# Patient Record
Sex: MALE | Race: WHITE | NOT HISPANIC OR LATINO | Employment: OTHER | URBAN - NONMETROPOLITAN AREA
[De-identification: names, ages, dates, MRNs, and addresses within clinical notes are randomized per-mention and may not be internally consistent; named-entity substitution may affect disease eponyms.]

---

## 2017-05-02 ENCOUNTER — AMBULATORY - GICH (OUTPATIENT)
Dept: PHYSICAL THERAPY | Facility: OTHER | Age: 77
End: 2017-05-02

## 2017-05-02 DIAGNOSIS — M54.50 LOW BACK PAIN: ICD-10-CM

## 2017-05-03 ENCOUNTER — HOSPITAL ENCOUNTER (OUTPATIENT)
Dept: PHYSICAL THERAPY | Facility: OTHER | Age: 77
Setting detail: THERAPIES SERIES
End: 2017-05-03

## 2017-05-03 DIAGNOSIS — M54.50 LOW BACK PAIN: ICD-10-CM

## 2017-05-09 ENCOUNTER — HOSPITAL ENCOUNTER (OUTPATIENT)
Dept: PHYSICAL THERAPY | Facility: OTHER | Age: 77
Setting detail: THERAPIES SERIES
End: 2017-05-09

## 2018-01-04 NOTE — INITIAL ASSESSMENTS
Patient Information     Patient Name MRN Sex Aden Chadwick [id number] Male 1940      Initial Assessments by Patience Stanley PT at 5/3/2017  9:01 AM     Author:  Patience Stanley PT Service:  (none) Author Type:  PT- Physical Therapist     Filed:  5/3/2017  1:50 PM Date of Service:  5/3/2017  9:01 AM Status:  Signed     :  Patience Stanley PT (PT- Physical Therapist)            [facility]    Outpatient PT - Initial Evaluation       Spine Evaluation      Date of Service: 5/3/2017   Visit# 1 (1 of 10)    Patient Name: Aden Rosa   YOB: 1940   Referring MD/Provider: Dr. Ramiro Olivares  Medical and Treatment Diagnosis: Low back Pain  PT Treatment Diagnosis: Lumbar stacked segmental dysfunction, backward sacral torsion, decrease left hip mobility, deconditioned  Start of Service: 17  Certification Dates: Start of Service: 17  Medicare/MA Re-Cert Due: 17     Living Situations:  Independent in Living Situation yes     Preadmission Functional Mobility: Independent  Yes  Cognition:  Oriented to Person, Place, and Time. yes  Precautions:  Multiple myeoloma    Were cultural / age or other special adaptations needed? No   Patient is a vulnerable adult: No   Patient is aware of diagnosis: Yes   Risks and benefits explained: Yes    Subjective      Patient is a 78 y/o male referred to PT due to left low back pain.  History: about 2 months ago he was brushing with the chain saw swinging back and forth. He also reports that he has cancer: multiple myeloma that is in the blood and is eating the bone. He reports a history of right low back pain. 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 He was supposed to have surgery,but, they feel the bone is too soft to do surgery.  Pt states that MD reviewed x-rays and blood work to rule out that his cancer could be the pain generator.   Patient s chief complaint: left low back pain    Current  Symptoms:    Pain Rating:  Current:   0 = no pain, comfortable, Worse  7 = Severe Pain, (Miserable, Gnawing, Fierce, Piercing)   Best  0 = no pain, comfortable        Symptoms are /intermittent.  Symptoms are described as: sharp,jabbing  Symptoms are unchanging:  Past 2 months  Pain Location - left low back and sacrum  Decreased Motion - no  Weakness - back  Swelling - no  Tingling/Numbness - no    Current functional difficulties include: ADL S:  Getting up down from chair, getting out of bed, bending to dress especially getting up                                                             Work: re-tired                                                             Recreational:  Working on cars; packing wheels and putting on shocks, shefali up car and getting up off floor                                                             Sleep: difficult finding a comfortable postion  Symptom onset:,Standing 5-10 min, Walking 10 min,  Dress/Groom , Lift/Bend/carry 10 lbs with back brace    Related Symptoms: headaches    Symptoms are decreased by: icing,stretches, heat    Prior Level:  No difficulty completing the above functional activities prior to onset.      Occupation:  [employer]/ /   Work Status: re-tired      Significant PMHX:  cancer       No past medical history on file.  No past surgical history on file.    Previous Treatment:    Pain Meds / Anti-inflammatory Medications: yes- OxyContin and oxycodone   Physical Therapy - no  Injections - no  Surgery - yes; lumbar; L4-5 disectomy    Diagnostics:  Reviewed (see chart)   Current Medications:  Reviewed (see chart)    Drug Allergies:  Reviewed (see chart)  ?   Latex Allergy:  See chart for allergies    Patient's Goals: decrease pain in order to go to car show in [place], [place]      Objective    Items left blank indicate that the test was inappropriate or not meaningful at the time of evaluation and therefore not performed.    Fall Risk Screening:   No risk factors identified    STANDING:           Posture: increase thoracic kyphosis, flattening of lumbar lordosis         Handedness: right         General listening: left posterior         Loading; decrease right pelvis         Active ROM: flexion 70, extension 10               Spinal Dysrhythmia: No         Stork Test: positive bilateral         Side bend from above: Left: and , Right: finger tips above knee joint line         Hip Drop Test: Left: decrease , Right: decrease pain on opposite side         Standing Pelvic Clocks: 3:00 = right side bending        SITTING:          KIMMY/Sacral Base Position: left posterior       Lumbar Findings: ERS-Right L5, FRS-Left L2 and FRS-Right L1       Thoracic Findings: decrease extension    SUPINE:        Hip: positive left ARACELI; tight bilateral piriformis      SLR: Right: 60 , Left 70 degrees      Palpation: tender bilateral iliopsoas, fascial tightness of liver and former gall bladder      PRONE:         Hip Extension: decreased bilaterally      KIMMY/Sacral Base Position: posterior left: left on right backward sacral torsion      Lumbar Findings: FRS-Right L3, FRS-Left L4, FRS-Right L5      Thoracic Findings: decrease extension      Palpation: no major QL tenderness        Today's Intervention:  Manual Therapy:  MET FRS-Right L3, FRS-Left L4, FRS-Right L5, and left on right backward sacral torsion correction; QL release. Therapeutic Exercises: see below        Response to Intervention:   Good tolerance to treatment     Home Exercise Program:  Instructed in beginning HEP to improve ROM, Flexibility, and strength   Prone press ups, supine hamstring/ dura stretch and piriformis stretch below and above 90 degrees      Assessment    Therapist Assessment / Clinical Impression:  Stacked extended lumbar dysfunctions, backward sacral torsion, decrease left hip mobility, deconditioned    Functional Impairment(s): See subjective on initial evaluation and Functional Assessment  listed below.      Patient Specific Functional and Pain Scales (PSFS):    Clinician Instructions: Complete after the history and before the exam.    Initial Assessment: We want to know what 3 activities in your life you are unable to perform, or are having the most difficulty performing, as a result of your chief problem. Please list and score at least 3 activities that you are unable to perform, or having the most difficulty performing, because of your chief problem.   Patient Specific Activity Scoring Scheme (score one number for each activity):   Activity Score (0-10)  0= Unable to perform activity  10= Able to perform activity at same level as before injury or problem   1. Standing 30 minutes 3/10   2. Walking 30 minutes 2/10   3. Working on cars for 60 minutes  4/10   4. Lifting/Bending/Carrying 25 lbs 4/10   5.  /10   Totals:  13/40 = 33 % ability which relates to 67% impairment    Patient verbally states that they understand that the information they have provided above is current and complete to the best of their knowledge.    Patient Specific Functional Scale Modifier Scale Conversion: (patient's modifier that correlates with pt's score on PSFS): 4-CL (60% Impaired).    G codes and Modifier taken from patient completing the PSFS:   Initial Primary G Code and Modifier:    Per the Patient's intake and/or assessment the Primary G Code is: Mobility .   The Patient's Impairment, Limitation or Restriction Modifier would be best described as: CL - 60% - 80% Impairment.   Goal Primary G Code and Modifier:    The Patient's G Code Goal would be: Mobility    The Patient's Impairment, Limitation or Restriction Modifier goal would be best described as: CJ - 20% - 40% Impairment.        Goals:  Patient goal:  See subjective      Short term goals: ( 4 weeks)    Pt will tolerate standing 30 minutes with moderate to minimal pain    Pt will tolerate walking 30 minutes with moderate to minimal pain    Pt will  tolerate working on cars for 60 minutes with moderate to minimal pain    Pt will tolerate lifting,bending,carrying 25 lbs with moderate to minimal pain        Long term goals: ( 8 weeks)    Patient will be independent in their Home Exercise Program      Pt will tolerate standing 30 minutes with minimal to no pain    Pt will tolerate walking 30 minutes with minimal to no pain    Pt will tolerate working on cars for 60 minutes with minimal to no pain    Pt will tolerate lifting,bending,carrying 25 lbs with minimal to no pain        Patient participated in goal selection and understand(s) the plan of care: Yes   Patient Potential for Achieving Desired Outcome: Good     Plan    Treatment Plan / Targeted Outcomes:      Frequency:   16 visits    Duration of Treatment: 8 weeks    Planned Interventions:    Home Exercise Program development  Therapeutic Exercise (ROM & Strengthening)  Neuromuscular Re-education  Manual Therapy      Thank you for your referral to [facility].  Please call with any questions, concerns or comments.  [phone]    HANNY Hernandez,PT

## 2018-01-04 NOTE — PROGRESS NOTES
Patient Information     Patient Name MRN Sex Aden Chadwick [id number] Male 1940      Progress Notes by Patience Stanley PT at 2017 10:19 AM     Author:  Patience Stanley PT Service:  (none) Author Type:  PT- Physical Therapist     Filed:  2017 12:23 PM Date of Service:  2017 10:19 AM Status:  Signed     :  Patience Stanley PT (PT- Physical Therapist)            [facility]    Outpatient PT - Daily Note      Date of Service: 2017   Visit# 2 (2 of 10)    Patient Name: Aden Rosa   YOB: 1940   Referring MD/Provider: Dr. Ramiro Olivares  Medical and Treatment Diagnosis: Low back Pain  PT Treatment Diagnosis: Lumbar stacked segmental dysfunction, backward sacral torsion, decrease left hip mobility, deconditioned  Start of Service: 17  Certification Dates: Start of Service: 17  Medicare/MA Re-Cert Due: 17     Living Situations:  Independent in Living Situation yes     Preadmission Functional Mobility: Independent  Yes  Cognition:  Oriented to Person, Place, and Time. yes  Precautions:  Multiple myeoloma    Were cultural / age or other special adaptations needed? No   Patient is a vulnerable adult: No   Patient is aware of diagnosis: Yes   Risks and benefits explained: Yes    Subjective      Patient returns to PT stating that he felt better after leaving PT last appointment. Later that day on Wednesday, went to neighbors and wired in switch on his dune buggy. He did hid exercises that night and felt stiff. Pt states that he feels a little tightness across low back and feels better than last week. Pt states that right total knee is tight.     Current Symptoms:    Pain Rating:  Current:   3 = Mild Pain, (Bothersome, Annoying, Irritating, Nagging), Worse  7 = Severe Pain, (Miserable, Gnawing, Fierce, Piercing)   Best  0 = no pain, comfortable        Symptoms are /intermittent.  Symptoms are described as: sharp,jabbing  Symptoms are  unchanging:  Past 2 months  Pain Location - left low back and sacrum  Decreased Motion - no  Weakness - back  Swelling - no  Tingling/Numbness - no    Current functional difficulties include: ADL S:  Getting up down from chair, getting out of bed, bending to dress especially getting up                                                             Work: re-tired                                                             Recreational:  Working on cars; packing wheels and putting on shocks, shefali up car and getting up off floor                                                             Sleep: difficult finding a comfortable postion  Symptom onset:,Standing 5-10 min, Walking 10 min,  Dress/Groom , Lift/Bend/carry 10 lbs with back brace    Related Symptoms: headaches    Symptoms are decreased by: icing,stretches, heat    Prior Level:  No difficulty completing the above functional activities prior to onset.      Occupation:  [employer]/ /   Work Status: re-tired      Significant PMHX:  cancer       No past medical history on file.  No past surgical history on file.    Previous Treatment:    Pain Meds / Anti-inflammatory Medications: yes- OxyContin and oxycodone   Physical Therapy - no  Injections - no  Surgery - yes; lumbar; L4-5 disectomy    Diagnostics:  Reviewed (see chart)   Current Medications:  Reviewed (see chart)    Drug Allergies:  Reviewed (see chart)  ?   Latex Allergy:  See chart for allergies    Patient's Goals: decrease pain in order to go to car show in [place], [place]      Objective    Items left blank indicate that the test was inappropriate or not meaningful at the time of evaluation and therefore not performed.    Fall Risk Screening:  No risk factors identified    STANDING:           Posture: increase thoracic kyphosis, flattening of lumbar lordosis         Handedness: right         General listening: left posterior         Loading; decrease right pelvis         Active ROM:  flexion 62, extension 18               Spinal Dysrhythmia: No         Stork Test: positive bilateral         Side bend from above: Left: and , Right: finger tips above knee joint line         Hip Drop Test: Left: decrease , Right: decrease pain on opposite side         Standing Pelvic Clocks: 3:00 = right side bending        SITTING:          KIMMY/Sacral Base Position: level       Lumbar Findings:  FRS-Right L1       Thoracic Findings: decrease extension    SUPINE:        Hip: positive left ARACELI; tight bilateral piriformis      SLR: Right: 60 , Left 70 degrees      Palpation: tender bilateral iliopsoas, fascial tightness of liver and former gall bladder      PRONE:         Hip Extension: decreased bilaterally      KIMMY/Sacral Base Position: posterior left: left on right backward sacral torsion      Lumbar Findings:: FRS-Right L1, FRS-Right L5      Thoracic Findings: decrease extension      Palpation: no major QL tenderness        Today's Intervention:  Manual Therapy:  MET: FRS-Right L1, FRS-Right L5, and left on right backward sacral torsion correction; QL release. Manual stretch hamstring/dura, piriformis, quadriceps, and iliopsoas. MFR: fascia over liver and scar tissue of gall bladder Therapeutic Exercises: see below        Response to Intervention:   Good tolerance to treatment     Home Exercise Program:  Instructed in beginning HEP to improve ROM, Flexibility, and strength   Prone press ups, supine hamstring/ dura stretch and piriformis stretch below and above 90 degrees.  Added seated Jose L chair, rotation, and side bending stretch, and standing hip flexor stretch.      Assessment    Therapist Assessment / Clinical Impression:  Stacked extended lumbar dysfunctions, backward sacral torsion, decrease left hip mobility, deconditioned    Functional Impairment(s): See subjective on initial evaluation and Functional Assessment listed below.      Patient Specific Functional and Pain Scales (PSFS):    Clinician  Instructions: Complete after the history and before the exam.    Initial Assessment: We want to know what 3 activities in your life you are unable to perform, or are having the most difficulty performing, as a result of your chief problem. Please list and score at least 3 activities that you are unable to perform, or having the most difficulty performing, because of your chief problem.   Patient Specific Activity Scoring Scheme (score one number for each activity):   Activity Score (0-10)  0= Unable to perform activity  10= Able to perform activity at same level as before injury or problem   1. Standing 30 minutes 3/10   2. Walking 30 minutes 2/10   3. Working on cars for 60 minutes  4/10   4. Lifting/Bending/Carrying 25 lbs 4/10   5.  /10   Totals:  13/40 = 33 % ability which relates to 67% impairment    Patient verbally states that they understand that the information they have provided above is current and complete to the best of their knowledge.    Patient Specific Functional Scale Modifier Scale Conversion: (patient's modifier that correlates with pt's score on PSFS): 4-CL (60% Impaired).    G codes and Modifier taken from patient completing the PSFS:   Initial Primary G Code and Modifier:    Per the Patient's intake and/or assessment the Primary G Code is: Mobility .   The Patient's Impairment, Limitation or Restriction Modifier would be best described as: CL - 60% - 80% Impairment.   Goal Primary G Code and Modifier:    The Patient's G Code Goal would be: Mobility    The Patient's Impairment, Limitation or Restriction Modifier goal would be best described as: CJ - 20% - 40% Impairment.        Goals:  Patient goal:  See subjective      Short term goals: ( 4 weeks)    Pt will tolerate standing 30 minutes with moderate to minimal pain    Pt will tolerate walking 30 minutes with moderate to minimal pain    Pt will tolerate working on cars for 60 minutes with moderate to minimal pain    Pt will tolerate  lifting,bending,carrying 25 lbs with moderate to minimal pain        Long term goals: ( 8 weeks)    Patient will be independent in their Home Exercise Program      Pt will tolerate standing 30 minutes with minimal to no pain    Pt will tolerate walking 30 minutes with minimal to no pain    Pt will tolerate working on cars for 60 minutes with minimal to no pain    Pt will tolerate lifting,bending,carrying 25 lbs with minimal to no pain        Patient participated in goal selection and understand(s) the plan of care: Yes   Patient Potential for Achieving Desired Outcome: Good     Plan    Treatment Plan / Targeted Outcomes:      Frequency:   16 visits    Duration of Treatment: 8 weeks    Planned Interventions:    Home Exercise Program development  Therapeutic Exercise (ROM & Strengthening)  Neuromuscular Re-education  Manual Therapy      Thank you for your referral to [facility].  Please call with any questions, concerns or comments.  [phone]    HANNY Hernandez,PT

## 2018-01-05 NOTE — DISCHARGE SUMMARY
Patient Information     Patient Name MRN Sex Aden Chadwick [id number] Male 1940      Discharge Summaries by Patience Stanley PT at 2017  8:42 AM     Author:  Patience Stanley PT Service:  (none) Author Type:  PT- Physical Therapist     Filed:  2017  8:44 AM Date of Service:  2017  8:42 AM Status:  Signed     :  Patience Stanley PT (PT- Physical Therapist)            [facility]    Outpatient PT - Daily Note      Addendum:  2017  Last date of Service: 17  Discharge date: 17  Discharge Summary:    Unplanned discharge   Patient seen for 2 visits total and did not return for additional PT visits.      Reason for discontinuation: patient called and cancelled remaining appointments. He states that exercises are too much for him at this time.   Goals were not met or only partially met.   Patient is discharged from PT.     Patience Stanley MOMT PT    Information below is from the last treatment date.    Date of Service: 2017   Visit# 2 (2 of 10)    Patient Name: Aden Rosa   YOB: 1940   Referring MD/Provider: Dr. Ramiro Olivares  Medical and Treatment Diagnosis: Low back Pain  PT Treatment Diagnosis: Lumbar stacked segmental dysfunction, backward sacral torsion, decrease left hip mobility, deconditioned  Start of Service: 17  Certification Dates: Start of Service: 17  Medicare/MA Re-Cert Due: 17     Living Situations:  Independent in Living Situation yes     Preadmission Functional Mobility: Independent  Yes  Cognition:  Oriented to Person, Place, and Time. yes  Precautions:  Multiple myeoloma    Were cultural / age or other special adaptations needed? No   Patient is a vulnerable adult: No   Patient is aware of diagnosis: Yes   Risks and benefits explained: Yes    Subjective      Patient returns to PT stating that he felt better after leaving PT last appointment. Later that day on Wednesday, went to neighbors and wired  in switch on his dune buggy. He did hid exercises that night and felt stiff. Pt states that he feels a little tightness across low back and feels better than last week. Pt states that right total knee is tight.     Current Symptoms:    Pain Rating:  Current:   3 = Mild Pain, (Bothersome, Annoying, Irritating, Nagging), Worse  7 = Severe Pain, (Miserable, Gnawing, Fierce, Piercing)   Best  0 = no pain, comfortable        Symptoms are /intermittent.  Symptoms are described as: sharp,jabbing  Symptoms are unchanging:  Past 2 months  Pain Location - left low back and sacrum  Decreased Motion - no  Weakness - back  Swelling - no  Tingling/Numbness - no    Current functional difficulties include: ADL S:  Getting up down from chair, getting out of bed, bending to dress especially getting up                                                             Work: re-tired                                                             Recreational:  Working on cars; packing wheels and putting on shocks, shefali up car and getting up off floor                                                             Sleep: difficult finding a comfortable postion  Symptom onset:,Standing 5-10 min, Walking 10 min,  Dress/Groom , Lift/Bend/carry 10 lbs with back brace    Related Symptoms: headaches    Symptoms are decreased by: icing,stretches, heat    Prior Level:  No difficulty completing the above functional activities prior to onset.      Occupation:  [employer]   Work Status: re-tired      Significant PMHX:  cancer       No past medical history on file.  No past surgical history on file.    Previous Treatment:    Pain Meds / Anti-inflammatory Medications: yes- OxyContin and oxycodone   Physical Therapy - no  Injections - no  Surgery - yes; lumbar; L4-5 disectomy    Diagnostics:  Reviewed (see chart)   Current Medications:  Reviewed (see chart)    Drug Allergies:  Reviewed (see chart)  ?   Latex Allergy:  See chart for  allergies    Patient's Goals: decrease pain in order to go to car show in [place], [place]      Objective    Items left blank indicate that the test was inappropriate or not meaningful at the time of evaluation and therefore not performed.    Fall Risk Screening:  No risk factors identified    STANDING:           Posture: increase thoracic kyphosis, flattening of lumbar lordosis         Handedness: right         General listening: left posterior         Loading; decrease right pelvis         Active ROM: flexion 62, extension 18               Spinal Dysrhythmia: No         Stork Test: positive bilateral         Side bend from above: Left: and , Right: finger tips above knee joint line         Hip Drop Test: Left: decrease , Right: decrease pain on opposite side         Standing Pelvic Clocks: 3:00 = right side bending        SITTING:          KIMMY/Sacral Base Position: level       Lumbar Findings:  FRS-Right L1       Thoracic Findings: decrease extension    SUPINE:        Hip: positive left ARACELI; tight bilateral piriformis      SLR: Right: 60 , Left 70 degrees      Palpation: tender bilateral iliopsoas, fascial tightness of liver and former gall bladder      PRONE:         Hip Extension: decreased bilaterally      KIMMY/Sacral Base Position: posterior left: left on right backward sacral torsion      Lumbar Findings:: FRS-Right L1, FRS-Right L5      Thoracic Findings: decrease extension      Palpation: no major QL tenderness        Today's Intervention:  Manual Therapy:  MET: FRS-Right L1, FRS-Right L5, and left on right backward sacral torsion correction; QL release. Manual stretch hamstring/dura, piriformis, quadriceps, and iliopsoas. MFR: fascia over liver and scar tissue of gall bladder Therapeutic Exercises: see below        Response to Intervention:   Good tolerance to treatment     Home Exercise Program:  Instructed in beginning HEP to improve ROM, Flexibility, and strength   Prone press ups, supine  hamstring/ dura stretch and piriformis stretch below and above 90 degrees.  Added seated Jose L chair, rotation, and side bending stretch, and standing hip flexor stretch.      Assessment    Therapist Assessment / Clinical Impression:  Stacked extended lumbar dysfunctions, backward sacral torsion, decrease left hip mobility, deconditioned    Functional Impairment(s): See subjective on initial evaluation and Functional Assessment listed below.      Patient Specific Functional and Pain Scales (PSFS):    Clinician Instructions: Complete after the history and before the exam.    Initial Assessment: We want to know what 3 activities in your life you are unable to perform, or are having the most difficulty performing, as a result of your chief problem. Please list and score at least 3 activities that you are unable to perform, or having the most difficulty performing, because of your chief problem.   Patient Specific Activity Scoring Scheme (score one number for each activity):   Activity Score (0-10)  0= Unable to perform activity  10= Able to perform activity at same level as before injury or problem   1. Standing 30 minutes 3/10   2. Walking 30 minutes 2/10   3. Working on cars for 60 minutes  4/10   4. Lifting/Bending/Carrying 25 lbs 4/10   5.  /10   Totals:  13/40 = 33 % ability which relates to 67% impairment    Patient verbally states that they understand that the information they have provided above is current and complete to the best of their knowledge.    Patient Specific Functional Scale Modifier Scale Conversion: (patient's modifier that correlates with pt's score on PSFS): 4-CL (60% Impaired).    G codes and Modifier taken from patient completing the PSFS:   Initial Primary G Code and Modifier:    Per the Patient's intake and/or assessment the Primary G Code is: Mobility .   The Patient's Impairment, Limitation or Restriction Modifier would be best described as: CL - 60% - 80% Impairment.   Goal Primary G  Code and Modifier:    The Patient's G Code Goal would be: Mobility    The Patient's Impairment, Limitation or Restriction Modifier goal would be best described as: CJ - 20% - 40% Impairment.        Goals:  Patient goal:  See subjective      Short term goals: ( 4 weeks)    Pt will tolerate standing 30 minutes with moderate to minimal pain    Pt will tolerate walking 30 minutes with moderate to minimal pain    Pt will tolerate working on cars for 60 minutes with moderate to minimal pain    Pt will tolerate lifting,bending,carrying 25 lbs with moderate to minimal pain        Long term goals: ( 8 weeks)    Patient will be independent in their Home Exercise Program      Pt will tolerate standing 30 minutes with minimal to no pain    Pt will tolerate walking 30 minutes with minimal to no pain    Pt will tolerate working on cars for 60 minutes with minimal to no pain    Pt will tolerate lifting,bending,carrying 25 lbs with minimal to no pain        Patient participated in goal selection and understand(s) the plan of care: Yes   Patient Potential for Achieving Desired Outcome: Good     Plan    Treatment Plan / Targeted Outcomes:      Frequency:   16 visits    Duration of Treatment: 8 weeks    Planned Interventions:    Home Exercise Program development  Therapeutic Exercise (ROM & Strengthening)  Neuromuscular Re-education  Manual Therapy      Thank you for your referral to [facility].  Please call with any questions, concerns or comments.  [phone]    HANNY Hernandez,PT

## 2018-03-06 ENCOUNTER — DOCUMENTATION ONLY (OUTPATIENT)
Dept: FAMILY MEDICINE | Facility: OTHER | Age: 78
End: 2018-03-06

## 2018-03-27 ENCOUNTER — TRANSFERRED RECORDS (OUTPATIENT)
Dept: HEALTH INFORMATION MANAGEMENT | Facility: CLINIC | Age: 78
End: 2018-03-27

## 2018-03-27 LAB
ALT SERPL-CCNC: 22 U/L (ref 18–65)
AST SERPL-CCNC: 22 U/L (ref 10–40)
CREAT SERPL-MCNC: 1.46 MG/DL (ref 0.8–1.5)
GFR SERPL CREATININE-BSD FRML MDRD: 47 ML/MIN/1.73M2
GLUCOSE SERPL-MCNC: 124 MG/DL (ref 60–99)
POTASSIUM SERPL-SCNC: 4 MEQ/L (ref 3.5–5.1)

## 2018-03-29 ENCOUNTER — HOSPITAL ENCOUNTER (OUTPATIENT)
Dept: CT IMAGING | Facility: HOSPITAL | Age: 78
Discharge: HOME | End: 2018-03-29
Attending: FAMILY MEDICINE | Admitting: FAMILY MEDICINE
Payer: MEDICARE

## 2018-03-29 DIAGNOSIS — R07.9 CHEST PAIN, UNSPECIFIED TYPE: ICD-10-CM

## 2018-03-29 DIAGNOSIS — Z85.830 HISTORY OF BONE CANCER: ICD-10-CM

## 2018-03-29 PROCEDURE — 71260 CT THORAX DX C+: CPT | Mod: TC

## 2018-03-29 PROCEDURE — 25000128 H RX IP 250 OP 636: Performed by: RADIOLOGY

## 2018-03-29 RX ORDER — IOPAMIDOL 612 MG/ML
100 INJECTION, SOLUTION INTRAVASCULAR ONCE
Status: COMPLETED | OUTPATIENT
Start: 2018-03-29 | End: 2018-03-29

## 2018-03-29 RX ADMIN — IOPAMIDOL 100 ML: 612 INJECTION, SOLUTION INTRAVENOUS at 10:59

## 2019-03-31 ENCOUNTER — HOSPITAL ENCOUNTER (INPATIENT)
Facility: OTHER | Age: 79
LOS: 7 days | Discharge: HOME | DRG: 871 | End: 2019-04-07
Attending: INTERNAL MEDICINE | Admitting: FAMILY MEDICINE
Payer: MEDICARE

## 2019-03-31 ENCOUNTER — APPOINTMENT (OUTPATIENT)
Dept: GENERAL RADIOLOGY | Facility: OTHER | Age: 79
DRG: 871 | End: 2019-03-31
Attending: INTERNAL MEDICINE
Payer: MEDICARE

## 2019-03-31 DIAGNOSIS — R09.02 HYPOXIA: ICD-10-CM

## 2019-03-31 DIAGNOSIS — J18.9 COMMUNITY ACQUIRED PNEUMONIA, UNSPECIFIED LATERALITY: Primary | ICD-10-CM

## 2019-03-31 DIAGNOSIS — C90.00 MULTIPLE MYELOMA, REMISSION STATUS UNSPECIFIED (H): ICD-10-CM

## 2019-03-31 DIAGNOSIS — R09.02 HYPOXEMIA: ICD-10-CM

## 2019-03-31 DIAGNOSIS — I10 ESSENTIAL HYPERTENSION: ICD-10-CM

## 2019-03-31 DIAGNOSIS — Z94.84 STEM CELLS TRANSPLANT STATUS (H): ICD-10-CM

## 2019-03-31 DIAGNOSIS — J84.10 PULMONARY FIBROSIS, UNSPECIFIED (H): ICD-10-CM

## 2019-03-31 DIAGNOSIS — D84.9 IMMUNODEFICIENCY (H): ICD-10-CM

## 2019-03-31 DIAGNOSIS — N20.0 KIDNEY STONES: ICD-10-CM

## 2019-03-31 DIAGNOSIS — Z92.21 PERSONAL HISTORY OF CHEMOTHERAPY: ICD-10-CM

## 2019-03-31 DIAGNOSIS — D84.9 IMMUNOCOMPROMISED PATIENT (H): ICD-10-CM

## 2019-03-31 DIAGNOSIS — C90.00 MULTIPLE MYELOMA, FAILED REMISSION (H): ICD-10-CM

## 2019-03-31 PROBLEM — A41.9 SEPSIS DUE TO PNEUMONIA (H): Status: ACTIVE | Noted: 2019-03-31

## 2019-03-31 PROBLEM — R65.10 SIRS (SYSTEMIC INFLAMMATORY RESPONSE SYNDROME) (H): Status: ACTIVE | Noted: 2019-03-31

## 2019-03-31 LAB
ALBUMIN SERPL-MCNC: 3.4 G/DL (ref 3.5–5.7)
ALP SERPL-CCNC: 44 U/L (ref 34–104)
ALT SERPL W P-5'-P-CCNC: 50 U/L (ref 7–52)
ANION GAP SERPL CALCULATED.3IONS-SCNC: 10 MMOL/L (ref 3–14)
AST SERPL W P-5'-P-CCNC: 151 U/L (ref 13–39)
BASOPHILS # BLD AUTO: 0 10E9/L (ref 0–0.2)
BASOPHILS NFR BLD AUTO: 0 %
BILIRUB SERPL-MCNC: 0.8 MG/DL (ref 0.3–1)
BUN SERPL-MCNC: 31 MG/DL (ref 7–25)
CALCIUM SERPL-MCNC: 8.5 MG/DL (ref 8.6–10.3)
CHLORIDE SERPL-SCNC: 102 MMOL/L (ref 98–107)
CO2 SERPL-SCNC: 22 MMOL/L (ref 21–31)
CREAT SERPL-MCNC: 2.04 MG/DL (ref 0.7–1.3)
DIFFERENTIAL METHOD BLD: ABNORMAL
EOSINOPHIL # BLD AUTO: 0 10E9/L (ref 0–0.7)
EOSINOPHIL NFR BLD AUTO: 0 %
ERYTHROCYTE [DISTWIDTH] IN BLOOD BY AUTOMATED COUNT: 15.8 % (ref 10–15)
FLUAV+FLUBV RNA SPEC QL NAA+PROBE: NEGATIVE
FLUAV+FLUBV RNA SPEC QL NAA+PROBE: NEGATIVE
GFR SERPL CREATININE-BSD FRML MDRD: 32 ML/MIN/{1.73_M2}
GLUCOSE SERPL-MCNC: 113 MG/DL (ref 70–105)
HCO3 BLD-SCNC: 21 MMOL/L (ref 22–28)
HCT VFR BLD AUTO: 33.4 % (ref 40–53)
HGB BLD-MCNC: 11.3 G/DL (ref 13.3–17.7)
IMM GRANULOCYTES # BLD: 0 10E9/L (ref 0–0.4)
IMM GRANULOCYTES NFR BLD: 1 %
LACTATE SERPL-SCNC: 1.6 MMOL/L (ref 0.5–2.2)
LYMPHOCYTES # BLD AUTO: 0.3 10E9/L (ref 0.8–5.3)
LYMPHOCYTES NFR BLD AUTO: 16.9 %
MCH RBC QN AUTO: 35.3 PG (ref 26.5–33)
MCHC RBC AUTO-ENTMCNC: 33.8 G/DL (ref 31.5–36.5)
MCV RBC AUTO: 104 FL (ref 78–100)
MONOCYTES # BLD AUTO: 0.2 10E9/L (ref 0–1.3)
MONOCYTES NFR BLD AUTO: 7.7 %
NEUTROPHILS # BLD AUTO: 1.5 10E9/L (ref 1.6–8.3)
NEUTROPHILS NFR BLD AUTO: 74.4 %
O2/TOTAL GAS SETTING VFR VENT: 28 %
OXYHGB MFR BLD: 88 %
PCO2 BLD: 35 MM HG (ref 35–45)
PH BLD: 7.4 PH (ref 7.35–7.45)
PLATELET # BLD AUTO: 83 10E9/L (ref 150–450)
PO2 BLD: 58 MM HG (ref 83–108)
POTASSIUM SERPL-SCNC: 3.8 MMOL/L (ref 3.5–5.1)
PROCALCITONIN SERPL-MCNC: 0.9 NG/ML
PROT SERPL-MCNC: 6.9 G/DL (ref 6.4–8.9)
RBC # BLD AUTO: 3.2 10E12/L (ref 4.4–5.9)
RSV RNA SPEC NAA+PROBE: NEGATIVE
SODIUM SERPL-SCNC: 134 MMOL/L (ref 134–144)
SPECIMEN SOURCE: NORMAL
WBC # BLD AUTO: 2 10E9/L (ref 4–11)

## 2019-03-31 PROCEDURE — 99285 EMERGENCY DEPT VISIT HI MDM: CPT | Mod: Z6 | Performed by: INTERNAL MEDICINE

## 2019-03-31 PROCEDURE — 25000125 ZZHC RX 250: Performed by: INTERNAL MEDICINE

## 2019-03-31 PROCEDURE — 85025 COMPLETE CBC W/AUTO DIFF WBC: CPT | Performed by: INTERNAL MEDICINE

## 2019-03-31 PROCEDURE — 93005 ELECTROCARDIOGRAM TRACING: CPT | Performed by: INTERNAL MEDICINE

## 2019-03-31 PROCEDURE — 71045 X-RAY EXAM CHEST 1 VIEW: CPT

## 2019-03-31 PROCEDURE — 80053 COMPREHEN METABOLIC PANEL: CPT | Performed by: INTERNAL MEDICINE

## 2019-03-31 PROCEDURE — 25000128 H RX IP 250 OP 636: Performed by: FAMILY MEDICINE

## 2019-03-31 PROCEDURE — 87040 BLOOD CULTURE FOR BACTERIA: CPT | Performed by: INTERNAL MEDICINE

## 2019-03-31 PROCEDURE — 96365 THER/PROPH/DIAG IV INF INIT: CPT | Performed by: INTERNAL MEDICINE

## 2019-03-31 PROCEDURE — 25800030 ZZH RX IP 258 OP 636: Performed by: FAMILY MEDICINE

## 2019-03-31 PROCEDURE — 99285 EMERGENCY DEPT VISIT HI MDM: CPT | Mod: 25 | Performed by: INTERNAL MEDICINE

## 2019-03-31 PROCEDURE — 96375 TX/PRO/DX INJ NEW DRUG ADDON: CPT | Performed by: INTERNAL MEDICINE

## 2019-03-31 PROCEDURE — 83605 ASSAY OF LACTIC ACID: CPT | Performed by: INTERNAL MEDICINE

## 2019-03-31 PROCEDURE — 94640 AIRWAY INHALATION TREATMENT: CPT

## 2019-03-31 PROCEDURE — A9270 NON-COVERED ITEM OR SERVICE: HCPCS | Mod: GY | Performed by: INTERNAL MEDICINE

## 2019-03-31 PROCEDURE — A9270 NON-COVERED ITEM OR SERVICE: HCPCS | Mod: GY | Performed by: FAMILY MEDICINE

## 2019-03-31 PROCEDURE — 87631 RESP VIRUS 3-5 TARGETS: CPT | Performed by: INTERNAL MEDICINE

## 2019-03-31 PROCEDURE — 82805 BLOOD GASES W/O2 SATURATION: CPT | Performed by: INTERNAL MEDICINE

## 2019-03-31 PROCEDURE — 36415 COLL VENOUS BLD VENIPUNCTURE: CPT | Performed by: INTERNAL MEDICINE

## 2019-03-31 PROCEDURE — 12000000 ZZH R&B MED SURG/OB

## 2019-03-31 PROCEDURE — 99222 1ST HOSP IP/OBS MODERATE 55: CPT | Mod: AI | Performed by: FAMILY MEDICINE

## 2019-03-31 PROCEDURE — 25000128 H RX IP 250 OP 636: Performed by: INTERNAL MEDICINE

## 2019-03-31 PROCEDURE — 96366 THER/PROPH/DIAG IV INF ADDON: CPT | Performed by: INTERNAL MEDICINE

## 2019-03-31 PROCEDURE — 40000275 ZZH STATISTIC RCP TIME EA 10 MIN

## 2019-03-31 PROCEDURE — 25800030 ZZH RX IP 258 OP 636: Performed by: INTERNAL MEDICINE

## 2019-03-31 PROCEDURE — 84145 PROCALCITONIN (PCT): CPT | Performed by: INTERNAL MEDICINE

## 2019-03-31 PROCEDURE — 93010 ELECTROCARDIOGRAM REPORT: CPT | Performed by: INTERNAL MEDICINE

## 2019-03-31 PROCEDURE — 87040 BLOOD CULTURE FOR BACTERIA: CPT | Mod: 91 | Performed by: INTERNAL MEDICINE

## 2019-03-31 PROCEDURE — 25000132 ZZH RX MED GY IP 250 OP 250 PS 637: Mod: GY | Performed by: INTERNAL MEDICINE

## 2019-03-31 PROCEDURE — 25000132 ZZH RX MED GY IP 250 OP 250 PS 637: Mod: GY | Performed by: FAMILY MEDICINE

## 2019-03-31 RX ORDER — OXYCODONE HCL 10 MG/1
10 TABLET, FILM COATED, EXTENDED RELEASE ORAL ONCE
Status: COMPLETED | OUTPATIENT
Start: 2019-03-31 | End: 2019-03-31

## 2019-03-31 RX ORDER — IPRATROPIUM BROMIDE AND ALBUTEROL SULFATE 2.5; .5 MG/3ML; MG/3ML
3 SOLUTION RESPIRATORY (INHALATION) ONCE
Status: COMPLETED | OUTPATIENT
Start: 2019-03-31 | End: 2019-03-31

## 2019-03-31 RX ORDER — ACETAMINOPHEN 325 MG/1
650 TABLET ORAL ONCE
Status: COMPLETED | OUTPATIENT
Start: 2019-03-31 | End: 2019-03-31

## 2019-03-31 RX ORDER — ONDANSETRON 2 MG/ML
4 INJECTION INTRAMUSCULAR; INTRAVENOUS EVERY 6 HOURS PRN
Status: DISCONTINUED | OUTPATIENT
Start: 2019-03-31 | End: 2019-04-07 | Stop reason: HOSPADM

## 2019-03-31 RX ORDER — ACETAMINOPHEN 325 MG/1
325-650 TABLET ORAL EVERY 6 HOURS PRN
Status: ON HOLD | COMMUNITY
End: 2019-04-01

## 2019-03-31 RX ORDER — SODIUM CHLORIDE, SODIUM LACTATE, POTASSIUM CHLORIDE, CALCIUM CHLORIDE 600; 310; 30; 20 MG/100ML; MG/100ML; MG/100ML; MG/100ML
INJECTION, SOLUTION INTRAVENOUS CONTINUOUS
Status: DISCONTINUED | OUTPATIENT
Start: 2019-03-31 | End: 2019-04-02

## 2019-03-31 RX ORDER — GABAPENTIN 600 MG/1
600 TABLET ORAL 3 TIMES DAILY
Status: DISCONTINUED | OUTPATIENT
Start: 2019-04-01 | End: 2019-04-01 | Stop reason: DRUGHIGH

## 2019-03-31 RX ORDER — LOPERAMIDE HCL 2 MG
2 CAPSULE ORAL 4 TIMES DAILY PRN
Status: DISCONTINUED | OUTPATIENT
Start: 2019-03-31 | End: 2019-04-01 | Stop reason: DRUGHIGH

## 2019-03-31 RX ORDER — ONDANSETRON 4 MG/1
4 TABLET, ORALLY DISINTEGRATING ORAL EVERY 6 HOURS PRN
Status: DISCONTINUED | OUTPATIENT
Start: 2019-03-31 | End: 2019-04-07 | Stop reason: HOSPADM

## 2019-03-31 RX ORDER — ASPIRIN 81 MG/1
81 TABLET ORAL DAILY
Status: DISCONTINUED | OUTPATIENT
Start: 2019-04-01 | End: 2019-04-07 | Stop reason: HOSPADM

## 2019-03-31 RX ORDER — GABAPENTIN 300 MG/1
600 CAPSULE ORAL ONCE
Status: COMPLETED | OUTPATIENT
Start: 2019-03-31 | End: 2019-03-31

## 2019-03-31 RX ORDER — LEVOFLOXACIN 750 MG/1
750 TABLET, FILM COATED ORAL DAILY
Status: ON HOLD | COMMUNITY
End: 2019-04-07

## 2019-03-31 RX ORDER — OXYCODONE HYDROCHLORIDE 5 MG/1
5 TABLET ORAL EVERY 4 HOURS PRN
Status: DISCONTINUED | OUTPATIENT
Start: 2019-03-31 | End: 2019-04-01

## 2019-03-31 RX ORDER — NALOXONE HYDROCHLORIDE 0.4 MG/ML
.1-.4 INJECTION, SOLUTION INTRAMUSCULAR; INTRAVENOUS; SUBCUTANEOUS
Status: DISCONTINUED | OUTPATIENT
Start: 2019-03-31 | End: 2019-04-07 | Stop reason: HOSPADM

## 2019-03-31 RX ORDER — OXYCODONE HYDROCHLORIDE 5 MG/1
5 TABLET ORAL ONCE
Status: COMPLETED | OUTPATIENT
Start: 2019-03-31 | End: 2019-03-31

## 2019-03-31 RX ORDER — TAMSULOSIN HYDROCHLORIDE 0.4 MG/1
0.4 CAPSULE ORAL DAILY
Status: DISCONTINUED | OUTPATIENT
Start: 2019-04-01 | End: 2019-04-01

## 2019-03-31 RX ORDER — PANTOPRAZOLE SODIUM 40 MG/1
40 TABLET, DELAYED RELEASE ORAL EVERY MORNING
Status: DISCONTINUED | OUTPATIENT
Start: 2019-04-01 | End: 2019-04-07 | Stop reason: HOSPADM

## 2019-03-31 RX ADMIN — SODIUM CHLORIDE, POTASSIUM CHLORIDE, SODIUM LACTATE AND CALCIUM CHLORIDE 1914 ML: 600; 310; 30; 20 INJECTION, SOLUTION INTRAVENOUS at 19:25

## 2019-03-31 RX ADMIN — SODIUM CHLORIDE 2000 MG: 900 INJECTION, SOLUTION INTRAVENOUS at 19:58

## 2019-03-31 RX ADMIN — IPRATROPIUM BROMIDE AND ALBUTEROL SULFATE 3 ML: .5; 3 SOLUTION RESPIRATORY (INHALATION) at 19:08

## 2019-03-31 RX ADMIN — OXYCODONE HYDROCHLORIDE 10 MG: 10 TABLET, FILM COATED, EXTENDED RELEASE ORAL at 20:12

## 2019-03-31 RX ADMIN — OXYCODONE HYDROCHLORIDE 5 MG: 5 TABLET ORAL at 20:04

## 2019-03-31 RX ADMIN — TAZOBACTAM SODIUM AND PIPERACILLIN SODIUM 4.5 G: 500; 4 INJECTION, SOLUTION INTRAVENOUS at 19:26

## 2019-03-31 RX ADMIN — LOPERAMIDE HYDROCHLORIDE 2 MG: 2 CAPSULE ORAL at 22:15

## 2019-03-31 RX ADMIN — AZITHROMYCIN MONOHYDRATE 500 MG: 500 INJECTION, POWDER, LYOPHILIZED, FOR SOLUTION INTRAVENOUS at 23:26

## 2019-03-31 RX ADMIN — GABAPENTIN 600 MG: 300 CAPSULE ORAL at 20:13

## 2019-03-31 RX ADMIN — ACETAMINOPHEN 650 MG: 325 TABLET, FILM COATED ORAL at 20:13

## 2019-03-31 RX ADMIN — SODIUM CHLORIDE, SODIUM LACTATE, POTASSIUM CHLORIDE, AND CALCIUM CHLORIDE 150 ML/HR: 600; 310; 30; 20 INJECTION, SOLUTION INTRAVENOUS at 21:10

## 2019-03-31 ASSESSMENT — ACTIVITIES OF DAILY LIVING (ADL)
RETIRED_COMMUNICATION: 0-->UNDERSTANDS/COMMUNICATES WITHOUT DIFFICULTY
RETIRED_EATING: 0-->INDEPENDENT
FALL_HISTORY_WITHIN_LAST_SIX_MONTHS: NO
TOILETING: 0-->INDEPENDENT
COGNITION: 0 - NO COGNITION ISSUES REPORTED
SWALLOWING: 0-->SWALLOWS FOODS/LIQUIDS WITHOUT DIFFICULTY
DRESS: 0-->INDEPENDENT
BATHING: 0-->INDEPENDENT
AMBULATION: 0-->INDEPENDENT
TRANSFERRING: 0-->INDEPENDENT

## 2019-03-31 ASSESSMENT — ENCOUNTER SYMPTOMS
FATIGUE: 1
FEVER: 1
WOUND: 0
CONFUSION: 0
APPETITE CHANGE: 1
BACK PAIN: 0
SHORTNESS OF BREATH: 1
WHEEZING: 1
BRUISES/BLEEDS EASILY: 1
ACTIVITY CHANGE: 1
CHEST TIGHTNESS: 0
COUGH: 1
HEMATURIA: 0
WEAKNESS: 1
ADENOPATHY: 0
CHILLS: 1
ABDOMINAL PAIN: 0

## 2019-03-31 ASSESSMENT — MIFFLIN-ST. JEOR: SCORE: 1592.62

## 2019-03-31 NOTE — ED TRIAGE NOTES
Pt to ER from home via EMS, was recently dx with pneumonia, placed on antibiotics. Pt had increased SOB and extreme weakness today, pt wife was going to bring pt in, but pt unable to walk or get in car. EMS reports 88% RA, placed on DuoNeb and O2.  Pt pale and lethargic on arrival.

## 2019-03-31 NOTE — PHARMACY-VANCOMYCIN DOSING SERVICE
"Pharmacy Consult- Vancomycin Assessment    Aden Rosa is a 79 year old male admitted on 3/31/2019.    Vancomycin has been ordered per MD, for the indication of: sepsis in ED    Current Antibiotic Regimen Includes: Zosyn    Patient Active Problem List   Diagnosis     Multiple myeloma (H)     Encounter for long-term current use of medication       Allergies (and reaction): Homatropine; Homatropine hbr; Prochlorperazine edisylate; and Prochlorperazine maleate    Most recent flowsheet Weight: 93 kg (205 lb)  Most recent flowsheet Height: 167.6 cm (5' 6\")    No intake or output data in the 24 hours ending 19 1858    Tmax = Temp (24hrs), Av.7  F (39.3  C), Min:102.7  F (39.3  C), Max:102.7  F (39.3  C)      Recent Labs   Lab Test 13  1032   WBC 2.7*       Recent Labs   Lab Test 18  1032 03/15/12  1053 09/15/11  1026   BUN  --  17 19 20   CR 1.46 1.33* 1.35* 1.34*       CrCl cannot be calculated (Patient's most recent lab result is older than the maximum 90 days allowed.).    Cultures Pending: blood cultures sent      Plan: For weight 93kg, max loading dose is 2g per protocol. Will defer further dosing to night service if patient is admitted and when current Scr is available.      Thank You for the consult. Will continue to follow.    Soledad Hernández [facility] ....................  3/31/2019   6:58 PM    "

## 2019-04-01 ENCOUNTER — APPOINTMENT (OUTPATIENT)
Dept: GENERAL RADIOLOGY | Facility: OTHER | Age: 79
DRG: 871 | End: 2019-04-01
Attending: FAMILY MEDICINE
Payer: MEDICARE

## 2019-04-01 LAB
ALBUMIN UR-MCNC: 100 MG/DL
ANION GAP SERPL CALCULATED.3IONS-SCNC: 6 MMOL/L (ref 3–14)
APPEARANCE UR: CLEAR
BACTERIA #/AREA URNS HPF: ABNORMAL /HPF
BILIRUB UR QL STRIP: NEGATIVE
BUN SERPL-MCNC: 28 MG/DL (ref 7–25)
CALCIUM SERPL-MCNC: 7.8 MG/DL (ref 8.6–10.3)
CHLORIDE SERPL-SCNC: 107 MMOL/L (ref 98–107)
CO2 SERPL-SCNC: 27 MMOL/L (ref 21–31)
COLOR UR AUTO: YELLOW
CREAT SERPL-MCNC: 1.82 MG/DL (ref 0.7–1.3)
ERYTHROCYTE [DISTWIDTH] IN BLOOD BY AUTOMATED COUNT: 15.9 % (ref 10–15)
GFR SERPL CREATININE-BSD FRML MDRD: 36 ML/MIN/{1.73_M2}
GLUCOSE SERPL-MCNC: 104 MG/DL (ref 70–105)
GLUCOSE UR STRIP-MCNC: NEGATIVE MG/DL
GRAN CASTS #/AREA URNS LPF: ABNORMAL /LPF
HCT VFR BLD AUTO: 30.2 % (ref 40–53)
HGB BLD-MCNC: 10 G/DL (ref 13.3–17.7)
HGB UR QL STRIP: ABNORMAL
KETONES UR STRIP-MCNC: NEGATIVE MG/DL
LEUKOCYTE ESTERASE UR QL STRIP: NEGATIVE
MCH RBC QN AUTO: 34.7 PG (ref 26.5–33)
MCHC RBC AUTO-ENTMCNC: 33.1 G/DL (ref 31.5–36.5)
MCV RBC AUTO: 105 FL (ref 78–100)
NITRATE UR QL: NEGATIVE
NON-SQ EPI CELLS #/AREA URNS LPF: ABNORMAL /LPF
PH UR STRIP: 5.5 PH (ref 5–9)
PLATELET # BLD AUTO: 60 10E9/L (ref 150–450)
POTASSIUM SERPL-SCNC: 3.9 MMOL/L (ref 3.5–5.1)
RBC # BLD AUTO: 2.88 10E12/L (ref 4.4–5.9)
RBC #/AREA URNS AUTO: ABNORMAL /HPF
SODIUM SERPL-SCNC: 140 MMOL/L (ref 134–144)
SOURCE: ABNORMAL
SP GR UR STRIP: >1.03 (ref 1–1.03)
UROBILINOGEN UR STRIP-ACNC: 0.2 EU/DL (ref 0.2–1)
WBC # BLD AUTO: 2.2 10E9/L (ref 4–11)
WBC #/AREA URNS AUTO: ABNORMAL /HPF

## 2019-04-01 PROCEDURE — 25000128 H RX IP 250 OP 636: Performed by: FAMILY MEDICINE

## 2019-04-01 PROCEDURE — 25000132 ZZH RX MED GY IP 250 OP 250 PS 637: Mod: GY | Performed by: FAMILY MEDICINE

## 2019-04-01 PROCEDURE — 25800030 ZZH RX IP 258 OP 636: Performed by: INTERNAL MEDICINE

## 2019-04-01 PROCEDURE — A9270 NON-COVERED ITEM OR SERVICE: HCPCS | Mod: GY | Performed by: FAMILY MEDICINE

## 2019-04-01 PROCEDURE — 85027 COMPLETE CBC AUTOMATED: CPT | Performed by: FAMILY MEDICINE

## 2019-04-01 PROCEDURE — 99232 SBSQ HOSP IP/OBS MODERATE 35: CPT | Performed by: FAMILY MEDICINE

## 2019-04-01 PROCEDURE — 71046 X-RAY EXAM CHEST 2 VIEWS: CPT

## 2019-04-01 PROCEDURE — 40000275 ZZH STATISTIC RCP TIME EA 10 MIN

## 2019-04-01 PROCEDURE — 36415 COLL VENOUS BLD VENIPUNCTURE: CPT | Performed by: FAMILY MEDICINE

## 2019-04-01 PROCEDURE — 12000000 ZZH R&B MED SURG/OB

## 2019-04-01 PROCEDURE — 80048 BASIC METABOLIC PNL TOTAL CA: CPT | Performed by: FAMILY MEDICINE

## 2019-04-01 PROCEDURE — 25800030 ZZH RX IP 258 OP 636: Performed by: FAMILY MEDICINE

## 2019-04-01 PROCEDURE — 81001 URINALYSIS AUTO W/SCOPE: CPT | Performed by: FAMILY MEDICINE

## 2019-04-01 RX ORDER — LOPERAMIDE HCL 2 MG
2 CAPSULE ORAL 2 TIMES DAILY PRN
Status: DISCONTINUED | OUTPATIENT
Start: 2019-04-01 | End: 2019-04-07 | Stop reason: HOSPADM

## 2019-04-01 RX ORDER — GABAPENTIN 600 MG/1
600 TABLET ORAL 2 TIMES DAILY
Status: DISCONTINUED | OUTPATIENT
Start: 2019-04-01 | End: 2019-04-01

## 2019-04-01 RX ORDER — AMITRIPTYLINE HYDROCHLORIDE 10 MG/1
10 TABLET ORAL EVERY EVENING
COMMUNITY
End: 2022-08-17

## 2019-04-01 RX ORDER — GABAPENTIN 300 MG/1
900 CAPSULE ORAL
Status: DISCONTINUED | OUTPATIENT
Start: 2019-04-02 | End: 2019-04-07 | Stop reason: HOSPADM

## 2019-04-01 RX ORDER — ACYCLOVIR 200 MG/1
400 CAPSULE ORAL 2 TIMES DAILY
Status: DISCONTINUED | OUTPATIENT
Start: 2019-04-01 | End: 2019-04-07 | Stop reason: HOSPADM

## 2019-04-01 RX ORDER — OXYCODONE HCL 10 MG/1
TABLET, FILM COATED, EXTENDED RELEASE ORAL
COMMUNITY
End: 2019-10-29

## 2019-04-01 RX ORDER — TAMSULOSIN HYDROCHLORIDE 0.4 MG/1
0.4 CAPSULE ORAL EVERY EVENING
COMMUNITY

## 2019-04-01 RX ORDER — TAMSULOSIN HYDROCHLORIDE 0.4 MG/1
0.4 CAPSULE ORAL EVERY EVENING
Status: DISCONTINUED | OUTPATIENT
Start: 2019-04-01 | End: 2019-04-07 | Stop reason: HOSPADM

## 2019-04-01 RX ORDER — AMITRIPTYLINE HYDROCHLORIDE 10 MG/1
10 TABLET ORAL EVERY EVENING
Status: DISCONTINUED | OUTPATIENT
Start: 2019-04-01 | End: 2019-04-07 | Stop reason: HOSPADM

## 2019-04-01 RX ORDER — LOPERAMIDE HCL 2 MG
2 CAPSULE ORAL 2 TIMES DAILY
Status: DISCONTINUED | OUTPATIENT
Start: 2019-04-01 | End: 2019-04-07 | Stop reason: HOSPADM

## 2019-04-01 RX ORDER — OXYCODONE HCL 20 MG/1
20 TABLET, FILM COATED, EXTENDED RELEASE ORAL EVERY MORNING
Status: DISCONTINUED | OUTPATIENT
Start: 2019-04-02 | End: 2019-04-07 | Stop reason: HOSPADM

## 2019-04-01 RX ORDER — GABAPENTIN 600 MG/1
600 TABLET ORAL 2 TIMES DAILY
Status: DISCONTINUED | OUTPATIENT
Start: 2019-04-01 | End: 2019-04-07 | Stop reason: HOSPADM

## 2019-04-01 RX ORDER — OXYCODONE HCL 10 MG/1
10 TABLET, FILM COATED, EXTENDED RELEASE ORAL 2 TIMES DAILY
Status: DISCONTINUED | OUTPATIENT
Start: 2019-04-01 | End: 2019-04-07 | Stop reason: HOSPADM

## 2019-04-01 RX ORDER — GABAPENTIN 600 MG/1
TABLET ORAL
COMMUNITY
End: 2022-08-17

## 2019-04-01 RX ORDER — ACYCLOVIR 400 MG/1
400 TABLET ORAL 2 TIMES DAILY
COMMUNITY

## 2019-04-01 RX ORDER — OXYCODONE HCL 10 MG/1
10 TABLET, FILM COATED, EXTENDED RELEASE ORAL EVERY 12 HOURS
Status: DISCONTINUED | OUTPATIENT
Start: 2019-04-01 | End: 2019-04-01 | Stop reason: DRUGHIGH

## 2019-04-01 RX ORDER — GABAPENTIN 600 MG/1
900 TABLET ORAL 3 TIMES DAILY
Status: DISCONTINUED | OUTPATIENT
Start: 2019-04-01 | End: 2019-04-01

## 2019-04-01 RX ORDER — OXYCODONE HYDROCHLORIDE 5 MG/1
5 TABLET ORAL EVERY 4 HOURS PRN
Status: DISCONTINUED | OUTPATIENT
Start: 2019-04-01 | End: 2019-04-07 | Stop reason: HOSPADM

## 2019-04-01 RX ORDER — LOPERAMIDE HYDROCHLORIDE 2 MG/1
2 TABLET ORAL 3 TIMES DAILY
COMMUNITY
End: 2024-03-06

## 2019-04-01 RX ORDER — POTASSIUM CHLORIDE 750 MG/1
10 TABLET, EXTENDED RELEASE ORAL DAILY
Status: ON HOLD | COMMUNITY
End: 2019-04-07

## 2019-04-01 RX ORDER — GABAPENTIN 300 MG/1
300 CAPSULE ORAL ONCE
Status: COMPLETED | OUTPATIENT
Start: 2019-04-01 | End: 2019-04-01

## 2019-04-01 RX ORDER — OXYCODONE HCL 10 MG/1
10 TABLET, FILM COATED, EXTENDED RELEASE ORAL ONCE
Status: COMPLETED | OUTPATIENT
Start: 2019-04-01 | End: 2019-04-01

## 2019-04-01 RX ADMIN — SODIUM CHLORIDE, SODIUM LACTATE, POTASSIUM CHLORIDE, AND CALCIUM CHLORIDE: 600; 310; 30; 20 INJECTION, SOLUTION INTRAVENOUS at 04:12

## 2019-04-01 RX ADMIN — PANTOPRAZOLE SODIUM 40 MG: 40 TABLET, DELAYED RELEASE ORAL at 08:14

## 2019-04-01 RX ADMIN — TAZOBACTAM SODIUM AND PIPERACILLIN SODIUM 3.38 G: 375; 3 INJECTION, SOLUTION INTRAVENOUS at 20:05

## 2019-04-01 RX ADMIN — ACYCLOVIR 400 MG: 200 CAPSULE ORAL at 13:56

## 2019-04-01 RX ADMIN — GABAPENTIN 600 MG: 600 TABLET, FILM COATED ORAL at 05:01

## 2019-04-01 RX ADMIN — LOPERAMIDE HYDROCHLORIDE 2 MG: 2 CAPSULE ORAL at 12:10

## 2019-04-01 RX ADMIN — LOPERAMIDE HYDROCHLORIDE 2 MG: 2 CAPSULE ORAL at 05:01

## 2019-04-01 RX ADMIN — OXYCODONE HYDROCHLORIDE 10 MG: 10 TABLET, FILM COATED, EXTENDED RELEASE ORAL at 19:16

## 2019-04-01 RX ADMIN — GABAPENTIN 600 MG: 600 TABLET, FILM COATED ORAL at 12:28

## 2019-04-01 RX ADMIN — OXYCODONE HYDROCHLORIDE 10 MG: 10 TABLET, FILM COATED, EXTENDED RELEASE ORAL at 13:54

## 2019-04-01 RX ADMIN — VANCOMYCIN HYDROCHLORIDE 2000 MG: 1 INJECTION, POWDER, LYOPHILIZED, FOR SOLUTION INTRAVENOUS at 20:48

## 2019-04-01 RX ADMIN — AMITRIPTYLINE HYDROCHLORIDE 10 MG: 10 TABLET, FILM COATED ORAL at 19:16

## 2019-04-01 RX ADMIN — TAMSULOSIN HYDROCHLORIDE 0.4 MG: 0.4 CAPSULE ORAL at 20:05

## 2019-04-01 RX ADMIN — TAZOBACTAM SODIUM AND PIPERACILLIN SODIUM 3.38 G: 375; 3 INJECTION, SOLUTION INTRAVENOUS at 08:03

## 2019-04-01 RX ADMIN — ACYCLOVIR 400 MG: 200 CAPSULE ORAL at 19:10

## 2019-04-01 RX ADMIN — LOPERAMIDE HYDROCHLORIDE 2 MG: 2 CAPSULE ORAL at 19:11

## 2019-04-01 RX ADMIN — SODIUM CHLORIDE, SODIUM LACTATE, POTASSIUM CHLORIDE, AND CALCIUM CHLORIDE: 600; 310; 30; 20 INJECTION, SOLUTION INTRAVENOUS at 12:11

## 2019-04-01 RX ADMIN — OXYCODONE HYDROCHLORIDE 5 MG: 5 TABLET ORAL at 09:12

## 2019-04-01 RX ADMIN — GABAPENTIN 600 MG: 600 TABLET, FILM COATED ORAL at 19:15

## 2019-04-01 RX ADMIN — AZITHROMYCIN MONOHYDRATE 250 MG: 500 INJECTION, POWDER, LYOPHILIZED, FOR SOLUTION INTRAVENOUS at 23:17

## 2019-04-01 RX ADMIN — TAZOBACTAM SODIUM AND PIPERACILLIN SODIUM 3.38 G: 375; 3 INJECTION, SOLUTION INTRAVENOUS at 01:52

## 2019-04-01 RX ADMIN — GABAPENTIN 300 MG: 300 CAPSULE ORAL at 13:55

## 2019-04-01 RX ADMIN — ASPIRIN 81 MG: 81 TABLET, COATED ORAL at 09:12

## 2019-04-01 RX ADMIN — TAMSULOSIN HYDROCHLORIDE 0.4 MG: 0.4 CAPSULE ORAL at 09:12

## 2019-04-01 RX ADMIN — TAZOBACTAM SODIUM AND PIPERACILLIN SODIUM 3.38 G: 375; 3 INJECTION, SOLUTION INTRAVENOUS at 14:27

## 2019-04-01 ASSESSMENT — ACTIVITIES OF DAILY LIVING (ADL)
ADLS_ACUITY_SCORE: 14

## 2019-04-01 ASSESSMENT — MIFFLIN-ST. JEOR: SCORE: 1620.75

## 2019-04-01 NOTE — PHARMACY-VANCOMYCIN DOSING SERVICE
Pharmacy Vancomycin Initial Note  Date of Service 2019  Patient's  1940  79 year old, male    Indication: Community Acquired Pneumonia and Sepsis    Current estimated CrCl = Estimated Creatinine Clearance: 31.9 mL/min (A) (based on SCr of 2.04 mg/dL (H)).    Creatinine for last 3 days  3/31/2019:  7:14 PM Creatinine 2.04 mg/dL    Recent Vancomycin Level(s) for last 3 days  No results found for requested labs within last 72 hours.      Vancomycin IV Administrations (past 72 hours)                   vancomycin (VANCOCIN) 2,000 mg in sodium chloride 0.9 % 500 mL intermittent infusion (mg) 2,000 mg New Bag 19 195                Nephrotoxins and other renal medications (From now, onward)    Start     Dose/Rate Route Frequency Ordered Stop    19  vancomycin (VANCOCIN) 2,000 mg in sodium chloride 0.9 % 500 mL intermittent infusion      2,000 mg  over 2 Hours Intravenous EVERY 48 HOURS 19 2223      19 0200  piperacillin-tazobactam (ZOSYN) infusion 3.375 g      3.375 g  100 mL/hr over 30 Minutes Intravenous EVERY 6 HOURS 19 220            Contrast Orders - past 72 hours (72h ago, onward)    None                Plan:  1.  Start vancomycin  2000 mg IV q48h.   2.  Goal Trough Level: 15-20 mg/L   3.  Pharmacy will check trough levels as appropriate in 3-5 Days.    4. Serum creatinine levels will be ordered daily for the first week of therapy and at least twice weekly for subsequent weeks.    5. [place] method utilized to dose vancomycin therapy: Method 2    Maged Packer, PharmD  2019

## 2019-04-01 NOTE — PHARMACY-ADMISSION MEDICATION HISTORY
Pharmacy -- Admission Medication Reconciliation    Prior to admission (PTA) medications were reviewed and the patient's PTA medication list was updated.    Sources Consulted:     The reliability of this Medication Reconciliation is: Reliability: Reliable    Pharmacist reviewed  due to oxycodone ER, oxycodone IR, gabapentin, diphenoxylate/atropine.    Lesley Huerta [facility], 4/1/2019,  1:05 PM

## 2019-04-01 NOTE — PROGRESS NOTES
[facility]    Medicine Progress Note - Hospitalist Service       Date of Admission:  3/31/2019  Assessment & Plan      Aden Rosa is a 79 year old male who presents with weakness.  Seen in [place] days prior to admission and diagnosed with right-sided pneumonia, started on Levaquin, with acute worsening on the day of admission.  Admitted and started on Zosyn and vancomycin.    Principal Problem:  Sepsis    Assessment: Stable without subjective improvement.  White blood cell count has increased slightly while hemoglobin and platelets of trended down due to dilution.     Plan: continue current broad-spectrum ABx and supportive care  Community acquired pneumonia of right lower lobe of lung (H)    Assessment: Immunosuppressed patient.  ANC adequate.  Last chemo 12 days ago.    Plan: Will continue fluids and antibiotics as started in the emergency department.  Monitor renal status carefully.  Repeat chest x-ray shows right lower lobe pneumonia.  Active Problems:  Multiple myeloma (H)    Assessment: Follows with oncology in [place].  Currently ixazomib oral chemotherapy 3 weeks on and one-week off.  Last dose 3/20/2019.    Plan: Follow CBC.  Stem cells transplant status (H)    Assessment: As above; has been stable    Plan: Continue to monitor carefully  Hypertension    Assessment: Currently not requiring medication, per patient blood pressure has been up and down recently and his primary physician is considering restarting medication.  A bit higher this morning but in pain.    Plan: Follow vital signs         Diet: Combination Diet Regular Diet Adult    DVT Prophylaxis: Pneumatic Compression Devices  Guevara Catheter: not present  Code Status: Full Code      Disposition Plan   Expected discharge: 2 - 3 days, recommended to prior living arrangement once adequate pain management/ tolerating PO medications, antibiotic plan established and O2 use less than 1 liters/minute.  Entered: Satish Alvarado,  MD 04/01/2019, 1:11 PM       The patient's care was discussed with the Patient and Patient's Family.    Satish Alvarado MD  Hospitalist Service  [facility]    ______________________________________________________________________    Interval History   She reports that he had a terrible night.  He tells me that he not able to get several medications since admission including amitriptyline, oxycodone, OxyContin, Imodium and gabapentin.  He has had frequent stooling this morning as well as increased pain.  Has had acute onset left lower rib cage pain.  Not necessarily pleuritic but does hurt with movement or coughing.  He feels his shortness of breath is unchanged from admission.  Cough continues to be nonproductive and he thinks maybe is a bit more prominent.  Has not had any abdominal pain.    Data reviewed today: I reviewed all medications, new labs and imaging results over the last 24 hours. I personally reviewed the chest x-ray image(s) showing Right lower lobe infiltrate.    Physical Exam   Vital Signs: Temp: 100.2  F (37.9  C) Temp src: Tympanic BP: 154/59 Pulse: 85 Heart Rate: 68 Resp: 16 SpO2: 94 % O2 Device: Nasal cannula Oxygen Delivery: 3 LPM  Weight: 211 lbs 3.21 oz  Constitutional: awake, alert, cooperative, no apparent distress, and appears stated age  Respiratory: No increased work of breathing.  Patient has rhonchi predominantly in lower and middle lung fields on right.  Cardiovascular: Regular rate and rhythm.  GI: Soft, nontender.  Musculoskeletal: Patient's lower left chest pain is reproducible with palpation of musculature between 8th and 9th rib just anterior to the mid axillary line.  No synovitis.  No warmth of the joints.  Neuropsychiatric: General: normal, calm and normal eye contact  Orientation: oriented to self, place, time and situation  Memory and insight: normal, memory for past and recent events intact and thought process normal    Data   Recent Labs   Lab  04/01/19  0412 03/31/19  1914   WBC 2.2* 2.0*   HGB 10.0* 11.3*   * 104*   PLT 60* 83*    134   POTASSIUM 3.9 3.8   CHLORIDE 107 102   CO2 27 22   BUN 28* 31*   CR 1.82* 2.04*   ANIONGAP 6 10   OLAYINKA 7.8* 8.5*    113*   ALBUMIN  --  3.4*   PROTTOTAL  --  6.9   BILITOTAL  --  0.8   ALKPHOS  --  44   ALT  --  50   AST  --  151*     Recent Results (from the past 24 hour(s))   XR Chest Port 1 View    Narrative    PROCEDURE:  XR CHEST PORT 1 VW    HISTORY:  hypoxia, fever.     COMPARISON:  CT chest 3/29/2018, chest x-ray 3/14/2013    FINDINGS:   The cardiac silhouette is enlarged but stable. The pulmonary  vasculature is normal.  There are airspace opacities in the right lung  base. There is also mild left basilar atelectasis. No pleural effusion  or pneumothorax.      Impression    IMPRESSION:  Right basilar infiltrate. Mild left basilar atelectasis  or infiltrate. Suggest continued follow-up to resolution.      ELIZABETH BENAVIDES MD   XR Chest 2 Views    Narrative    PROCEDURE:  XR CHEST 2 VW    HISTORY:  pneumonia.     COMPARISON:  3/31/2019    FINDINGS:   The cardiac silhouette is stable in size. The pulmonary vasculature is  normal.  There are persistent airspace opacities in the right lower  lung, unchanged. Left basilar airspace disease is also unchanged. No  pleural effusion or pneumothorax.      Impression    IMPRESSION:  Bibasilar atelectasis or infiltrate, unchanged.      ELIZABETH BENAVIDES MD

## 2019-04-01 NOTE — ED PROVIDER NOTES
History     Chief Complaint   Patient presents with     Fever     Shortness of Breath     HPI  Aden Rosa is a 79 year old male who patient was brought in by EMS due to significant weakness.  He was diagnosed at [facility] in [place] on Thursday with pneumonia.  He was given a shot of Rocephin and started on Levaquin.  He took his Levaquin today.  States that he still has a couple days left of antibiotics.    He finished Bactrim on Friday or Saturday, per his wife's report.    Patient is immunocompromised, currently getting chemotherapy for multiple myeloma.    Wife states that Friday he did quite a bit better than Thursday but then has been declining the last couple of days.    Today he is so short of breath and weak he was not able to get in the car even with her assistance.    Upon arrival to the emergency room his oxygen saturations were 85%.  Had a temperature of 102.7.  He is quite pale and lethargic.    Wife states he did eat soup and crackers about 1:00 this afternoon.    He was started on oxygen and given a DuoNeb upon arrival to the ER today.    Allergies:  Allergies   Allergen Reactions     Homatropine      The medication he used was Hydromet cough med, which is combination for hydrocodone and homatrop/homotropine per the pharmacy.     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     Priority: Medium     [facility]            Past Medical History:    Past Medical History:   Diagnosis Date     Hypertension      Kidney stones      Multiple myeloma (H) 2010     Spinal stenosis      Stem cells transplant status (H) 2011       Past Surgical History:    Past Surgical History:   Procedure Laterality Date     BACK SURGERY  2010    Microsurgery     C TOTAL KNEE ARTHROPLASTY Right      CHOLECYSTECTOMY       HEMORRHOIDECTOMY      x2       Family History:    Family History   Problem Relation Age of Onset     Cancer Other      Diabetes Other      Heart Disease Father         disease     Other - See Comments Other        Social History:  Marital Status:   [2]  Social History     Tobacco Use     Smoking status: Never Smoker     Smokeless tobacco: Never Used   Substance Use Topics     Alcohol use: No     Drug use: No        Medications:      acetaminophen (TYLENOL) 325 MG tablet   Calcium Carb-Cholecalciferol (CALCIUM 600+D3) 600-200 MG-UNIT TABS   gabapentin (NEURONTIN) 300 MG tablet   gabapentin (NEURONTIN) 600 MG tablet   IRON PO   levofloxacin (LEVAQUIN) 750 MG tablet   omeprazole (PRILOSEC) 40 MG capsule   oxycodone (OXYCONTIN) 10 MG 12 hr tablet   oxycodone (ROXICODONE) 5 MG immediate release tablet   tamsulosin (FLOMAX) 0.4 MG 24 hr capsule   Vitamin C, Calcium Ascorbate, SOLR   ascorbic acid (VITAMIN C) 500 MG tablet   aspirin 81 MG EC tablet   LENalidomide (REVLIMID) 5 MG CAPS capsule CHEMOTHERAPY   sulfamethoxazole-trimethoprim (BACTRIM DS) 800-160 MG per tablet         Review of Systems   Constitutional: Positive for activity change, appetite change, chills, fatigue and fever.   HENT: Negative for congestion.    Eyes: Negative for visual disturbance.   Respiratory: Positive for cough, shortness of breath and wheezing. Negative for chest tightness.    Cardiovascular: Positive for leg swelling. Negative for chest pain.   Gastrointestinal: Negative for abdominal pain.   Genitourinary: Negative for hematuria.   Musculoskeletal:  "Negative for back pain.   Skin: Positive for pallor. Negative for rash and wound.   Allergic/Immunologic: Positive for immunocompromised state.   Neurological: Positive for weakness. Negative for syncope.   Hematological: Negative for adenopathy. Bruises/bleeds easily.   Psychiatric/Behavioral: Negative for confusion.       Physical Exam   BP: 152/62  Pulse: 98  Heart Rate: 95  Temp: 102.7  F (39.3  C)  Resp: 26  Height: 167.6 cm (5' 6\")  Weight: 93 kg (205 lb)  SpO2: (!) 85 %      Physical Exam   Constitutional:   Pale and ill-appearing.   HENT:   Head: Normocephalic and atraumatic.   Eyes: Pupils are equal, round, and reactive to light. No scleral icterus.   Cardiovascular: Normal rate.   Pulmonary/Chest: He has wheezes. He has rales.   Tachypnea   Abdominal: Soft. He exhibits no distension.   Musculoskeletal: He exhibits edema.   Lymphadenopathy:     He has no cervical adenopathy.   Neurological: A cranial nerve deficit is present.   Alert but quite tired.   Skin: There is pallor.   Psychiatric: He has a normal mood and affect.     Allergies   Allergen Reactions     Homatropine      The medication he used was Hydromet cough med, which is combination for hydrocodone and homatrop/homotropine per the pharmacy.     Homatropine Hbr      Prochlorperazine Edisylate      Compazine       Prochlorperazine Maleate      Compazine       Patient Vitals for the past 24 hrs:   BP Temp Temp src Pulse Resp SpO2 Height Weight   03/31/19 2000 158/71 102.3  F (39.1  C) Tympanic 98 28 93 % -- --   03/31/19 1945 160/63 -- -- 96 21 94 % -- --   03/31/19 1930 152/62 -- -- 98 24 92 % -- --   03/31/19 1915 152/67 -- -- 95 21 (!) 89 % -- --   03/31/19 1908 -- -- -- -- -- (!) 89 % -- --   03/31/19 1900 -- -- -- -- 21 91 % -- --   03/31/19 1845 141/61 -- -- 96 24 (!) 87 % -- --   03/31/19 1843 152/62 102.7  F (39.3  C) Tympanic 98 26 (!) 85 % 1.676 m (5' 6\") 93 kg (205 lb)     Orders Placed This Encounter   Procedures     XR Chest Port 1 View "     CBC with platelets differential     Comprehensive metabolic panel     Lactic acid     *UA reflex to Microscopic     Procalcitonin     Blood gas arterial and oxyhgb     EKG 12 lead     Pulse oximetry nursing     Cardiac Continuous Monitoring     Measure urine output     Patient care order     Pharmacy to dose vancomycin     Oxygen: Nasal cannula, Oxygen mask     ED Bed Request       ED Course     ED Course as of Mar 31 2049   Sun Mar 31, 2019   2005 Sepsis protocol initiated.  Labs ordered, blood cultures collected.  Start Zosyn and vancomycin per sepsis protocol.  Lactated Ringer bolus of 1900 mL.DuoNeb administered.Wife is adamant about getting his regular evening pain medications, these are ordered.  --Flomax held at this time.    2048 Patient was reevaluated.  Breathing easier.  More alert and interactive at this time.  Discussed hospitalization, he would like to stay at least overnight tonight.  Still having generalized weakness.  Requiring supplemental oxygen which is new for him.  Currently on 3 L/min.  Discussed with hospitalist who accepted patient for admission.  Blood cultures are pending.  Influenza negative.  Lactate normal.      Procedures           EKG shows sinus rhythm with sinus arrhythmia.  Rate 98.  Normal axis.  Otherwise normal EKG.       Results for orders placed or performed during the hospital encounter of 03/31/19 (from the past 24 hour(s))   CBC with platelets differential   Result Value Ref Range    WBC 2.0 (L) 4.0 - 11.0 10e9/L    RBC Count 3.20 (L) 4.4 - 5.9 10e12/L    Hemoglobin 11.3 (L) 13.3 - 17.7 g/dL    Hematocrit 33.4 (L) 40.0 - 53.0 %     (H) 78 - 100 fl    MCH 35.3 (H) 26.5 - 33.0 pg    MCHC 33.8 31.5 - 36.5 g/dL    RDW 15.8 (H) 10.0 - 15.0 %    Platelet Count 83 (L) 150 - 450 10e9/L    Diff Method Automated Method     % Neutrophils 74.4 %    % Lymphocytes 16.9 %    % Monocytes 7.7 %    % Eosinophils 0.0 %    % Basophils 0.0 %    % Immature Granulocytes 1.0 %     Absolute Neutrophil 1.5 (L) 1.6 - 8.3 10e9/L    Absolute Lymphocytes 0.3 (L) 0.8 - 5.3 10e9/L    Absolute Monocytes 0.2 0.0 - 1.3 10e9/L    Absolute Eosinophils 0.0 0.0 - 0.7 10e9/L    Absolute Basophils 0.0 0.0 - 0.2 10e9/L    Abs Immature Granulocytes 0.0 0 - 0.4 10e9/L   Comprehensive metabolic panel   Result Value Ref Range    Sodium 134 134 - 144 mmol/L    Potassium 3.8 3.5 - 5.1 mmol/L    Chloride 102 98 - 107 mmol/L    Carbon Dioxide 22 21 - 31 mmol/L    Anion Gap 10 3 - 14 mmol/L    Glucose 113 (H) 70 - 105 mg/dL    Urea Nitrogen 31 (H) 7 - 25 mg/dL    Creatinine 2.04 (H) 0.70 - 1.30 mg/dL    GFR Estimate 32 (L) >60 mL/min/[1.73_m2]    GFR Estimate If Black 38 (L) >60 mL/min/[1.73_m2]    Calcium 8.5 (L) 8.6 - 10.3 mg/dL    Bilirubin Total 0.8 0.3 - 1.0 mg/dL    Albumin 3.4 (L) 3.5 - 5.7 g/dL    Protein Total 6.9 6.4 - 8.9 g/dL    Alkaline Phosphatase 44 34 - 104 U/L    ALT 50 7 - 52 U/L     (H) 13 - 39 U/L   Lactic acid   Result Value Ref Range    Lactic Acid 1.6 0.5 - 2.2 mmol/L   Procalcitonin   Result Value Ref Range    Procalcitonin 0.90 ng/ml   Blood gas arterial and oxyhgb   Result Value Ref Range    pH Arterial 7.40 7.35 - 7.45 pH    pCO2 Arterial 35 35 - 45 mm Hg    pO2 Arterial 58 (L) 83 - 108 mm Hg    Bicarbonate Arterial 21 (L) 22 - 28 mmol/L    FIO2 28     Oxyhemoglobin Arterial 88 (L) >95 %   Influenza A and B and RSV PCR   Result Value Ref Range    Specimen Description Nasopharyngeal     Influenza A PCR Negative NEG^Negative    Influenza B PCR Negative NEG^Negative    Resp Syncytial Virus Negative NEG^Negative       Medications   lactated ringers infusion (not administered)   vancomycin (VANCOCIN) 2,000 mg in sodium chloride 0.9 % 500 mL intermittent infusion (2,000 mg Intravenous New Bag 3/31/19 1958)   lactated ringers BOLUS 1,914 mL (1,914 mLs Intravenous New Bag 3/31/19 1925)   piperacillin-tazobactam (ZOSYN) intermittent infusion 4.5 g (4.5 g Intravenous New Bag 3/31/19 1926)    ipratropium - albuterol 0.5 mg/2.5 mg/3 mL (DUONEB) neb solution 3 mL (3 mLs Nebulization Given 3/31/19 1908)   oxyCODONE (oxyCONTIN) 12 hr tablet 10 mg (10 mg Oral Given 3/31/19 2012)   gabapentin (NEURONTIN) capsule 600 mg (600 mg Oral Given 3/31/19 2013)   oxyCODONE (ROXICODONE) tablet 5 mg (5 mg Oral Given 3/31/19 2004)   acetaminophen (TYLENOL) tablet 650 mg (650 mg Oral Given 3/31/19 2013)       Assessments & Plan (with Medical Decision Making)     I have reviewed the nursing notes.    I have reviewed the findings, diagnosis, plan and need for follow up with the patient.       ED to Inpatient Handoff:    Discussed with Dr Valencia   Patient accepted for Inpatient Stay  Pending studies include blood cultures  Code Status: Not Addressed              Medication List      There are no discharge medications for this visit.         Final diagnoses:   Immunocompromised patient (H)   Community acquired pneumonia, unspecified laterality   Multiple myeloma, remission status unspecified (H)   Hypoxia       3/31/2019   [facility]     Rony Galarza MD  03/31/19 3702

## 2019-04-01 NOTE — H&P
[facility]    History and Physical  Hospitalist       Date of Admission:  3/31/2019    Assessment & Plan   Aden Rosa is a 79 year old male who presents with weakness.  Seen in [place] days prior to admission and diagnosed with right-sided pneumonia, started on Levaquin, with acute worsening on the day of admission.  Admitted and started on Zosyn and vancomycin.    Principal Problem:  Sepsis    Assessment: meets SIRS criteria of fever, WBC suppression, elevated Cr, and tachycardia. Improved after fluids in ED.     Plan: continue current broad-spectrum ABx and supportive care  Community acquired pneumonia of right lower lobe of lung (H)    Assessment: Immunosuppressed patient.  ANC adequate.  Last chemo 11 days ago.    Plan: Patient is admitted and will continue fluids and antibiotics as started in the emergency department.  Monitor renal status carefully.  Repeat chest x-ray in the morning to obtain a more reliable PA and lateral.  Active Problems:  Multiple myeloma (H)    Assessment: Follows with oncology in [place].  Currently using Dorothea Laro oral chemotherapy 3 weeks on and one-week off.  Last dose 3/20/2019.    Plan: Follow CBC.  Stem cells transplant status (H)    Assessment: As above; has been stable    Plan: Continue to monitor carefully  Hypertension    Assessment: Currently not requiring medication, per patient blood pressure has been up and down recently and his primary physician is considering restarting medication    Plan: Follow vital signs    DVT Prophylaxis: Enoxaparin (Lovenox) SQ  Code Status: Prior    Renetta Valencia    Primary Care Physician   Ramiro Olivares    Chief Complaint   Weakness    History is obtained from the patient, spouse, ED physician, and chart review.    History of Present Illness   Aden Rosa is a 79 year old male who presents with weakness and cough.  He is being followed for multiple myeloma and uses oral chemotherapy, Ninlaro, 3 weeks on and 3  weeks off with most recent dose 3/20/2019.  He started to develop a sore throat and cold symptoms for 5 days prior to admission and was seen by his primary physician, Dr. Olivares, at [facility] in [place].  He was given a shot of Rocephin and started on Levaquin, which he has been taking.  Initially felt better, but then today was weak and shaky and felt short of breath.  He was unable to get in the car and was brought to the emergency department via EMS.  He had an initial oxygen saturation of 85% in the ER, temp 102.7, heart rate 90s, normal blood pressure, no confusion.    Past Medical History    I have reviewed this patient's medical history and updated it with pertinent information if needed.   Past Medical History:   Diagnosis Date     Hypertension      Kidney stones      Multiple myeloma (H) 2010     Spinal stenosis     with chronic back pain     Stem cells transplant status (H) 2011    [facility]        Past Surgical History   I have reviewed this patient's surgical history and updated it with pertinent information if needed.  Past Surgical History:   Procedure Laterality Date     BACK SURGERY  2010    Microsurgery     C TOTAL KNEE ARTHROPLASTY Right      CHOLECYSTECTOMY       HEMORRHOIDECTOMY      x2       Prior to Admission Medications   Prior to Admission Medications   Prescriptions Last Dose Informant Patient Reported? Taking?   Calcium Carb-Cholecalciferol (CALCIUM 600+D3) 600-200 MG-UNIT TABS 3/31/2019 at Unknown time  Yes Yes   Sig: Take a half tab daily   IRON PO 3/31/2019 at Unknown time  Yes Yes   Sig: Take 1 tablet by mouth daily.   LENalidomide (REVLIMID) 5 MG CAPS capsule CHEMOTHERAPY   Yes No   Sig: Take one capsule (5mg) by oral route every day   Vitamin C, Calcium Ascorbate, SOLR 3/31/2019 at Unknown time  Yes Yes   Sig: Take 1 tablet by mouth daily.   acetaminophen (TYLENOL) 325 MG tablet 3/31/2019 at 1200  Yes Yes   Sig: Take 325-650 mg by mouth every 6 hours as needed for mild pain    ascorbic acid (VITAMIN C) 500 MG tablet More than a month at Unknown time  Yes No   aspirin 81 MG EC tablet More than a month at Unknown time  Yes No   Sig: Take one tablet (81mg) by oral route every day   gabapentin (NEURONTIN) 300 MG tablet 3/31/2019 at Unknown time  No Yes   Sig: Take 2 tablets by mouth 3 times daily. Before breakfast and lunch, then at bedtime.   gabapentin (NEURONTIN) 600 MG tablet 3/31/2019 at Unknown time  Yes Yes   Sig: Take one tablet (600mg) by oral route 3 times every day   levofloxacin (LEVAQUIN) 750 MG tablet 3/31/2019 at Unknown time  Yes Yes   Sig: Take 750 mg by mouth daily   omeprazole (PRILOSEC) 40 MG capsule 3/31/2019 at Unknown time  Yes Yes   Sig: Take one capsule (40mg) by oral route every day before a meal   oxycodone (OXYCONTIN) 10 MG 12 hr tablet 3/31/2019 at Unknown time  No Yes   Sig: Take 1 tablet by mouth every 12 hours.   oxycodone (ROXICODONE) 5 MG immediate release tablet 3/31/2019 at Unknown time  No Yes   Sig: Take 1 tablet by mouth every 4 hours as needed.   sulfamethoxazole-trimethoprim (BACTRIM DS) 800-160 MG per tablet   No No   Sig: Take 1 tablet by mouth. Take one tablet two times a day on Monday and tuesdays   tamsulosin (FLOMAX) 0.4 MG 24 hr capsule 3/31/2019 at Unknown time  Yes Yes   Sig: Take by mouth daily Take one capsule (0.4mg) by oral route every day 1/2 hour following the same meal each day      Facility-Administered Medications: None     Allergies   Allergies   Allergen Reactions     Homatropine      The medication he used was Hydromet cough med, which is combination for hydrocodone and homatrop/homotropine per the pharmacy.     Homatropine Hbr      Prochlorperazine Edisylate      Compazine       Prochlorperazine Maleate      Compazine         Social History   I have reviewed this patient's social history and updated it with pertinent information if needed. Aden MAIN Moe  reports that  has never smoked. he has never used smokeless tobacco. He  reports that he does not drink alcohol or use drugs.    Family History   I have reviewed this patient's family history and updated it with pertinent information if needed.   Family History   Problem Relation Age of Onset     Cancer Other      Diabetes Other      Heart Disease Father 82        disease     Other - See Comments Other      Alzheimer Disease Mother          at 97     Diabetes Type 2  Sister        Review of Systems     REVIEW OF SYSTEMS:    Constitutional: See HPI  Eyes: Wears glasses, no other problems  Ears, nose, mouth, throat, and face: Mild hearing loss  Respiratory: See HPI  Cardiovascular: no chest pain, palpitations, or syncope.  Has chronic lower extremity edema  Gastrointestinal: no constipation, diarrhea, nausea, vomiting or abdominal pain.  Genitourinary: no dysuria, hematuria, urgency or frequency.   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  Endocrine: not a known diabetic.     Physical Exam   Temp: 100.7  F (38.2  C) Temp src: Tympanic BP: 133/60 Pulse: 85 Heart Rate: 90 Resp: 12 SpO2: 92 % O2 Device: Nasal cannula Oxygen Delivery: 3 LPM  Vital Signs with Ranges  Temp:  [100.7  F (38.2  C)-102.7  F (39.3  C)] 100.7  F (38.2  C)  Pulse:  [85-98] 85  Heart Rate:  [88-98] 90  Resp:  [12-28] 12  BP: (133-160)/(60-89) 133/60  SpO2:  [85 %-94 %] 92 %  205 lbs 0 oz    Constitutional: Alert, oriented x3, no respiratory distress  Eyes: Clear and nonicteric.  PERRLA, EOMI  HEENT: TMs clear.  Normocephalic.  Pharynx pink and moist  Respiratory: Decreased breath sounds in the right base with dullness to percussion.  Coarse rhonchi throughout  Cardiovascular: Regular rate and rhythm, no murmur heard  GI: Soft with no mass, tenderness, or HSM  Lymph/Hematologic: No cervical or supraclavicular nodes  Skin: No acute rash  Musculoskeletal:  Osteoarthritic changes, no warm or erythematous joints  Neurologic: Face is symmetric, moves all extremities, DTRs symmetric  Psychiatric: Mood appropriate    Data   Data reviewed today:  I personally reviewed the chest x-ray image(s) showing A right basilar infiltrate.  Radiology reading pending    Recent Labs   Lab 03/31/19 1914   WBC 2.0*   HGB 11.3*   *   PLT 83*      POTASSIUM 3.8   CHLORIDE 102   CO2 22   BUN 31*   CR 2.04*   ANIONGAP 10   OLAYINKA 8.5*   *   ALBUMIN 3.4*   PROTTOTAL 6.9   BILITOTAL 0.8   ALKPHOS 44   ALT 50   *

## 2019-04-01 NOTE — ED NOTES
Dr. Valencia here to assess and write admit orders, Ambreen RN will take to [facility] room 302 when MD completes assessment.

## 2019-04-01 NOTE — PLAN OF CARE
"Adult Inpatient Plan of Care  Plan of Care Review  4/1/2019 1556 by Gretta Sawyer, RN  Note  Pt and wife calmer at this time since medications have been correctly scheduled. LS have crackles, audible wheezes noted at times. Pt remains on 3L acute. IV Abx given. Pt continues to have diarrhea, but amount is scant. Pt voiding well. Pt educated about new PRN Imodium orders. Pt continues to rate RUQ pain 9/10, warm packs given.   Gretta Sawyer, RN on 4/1/2019 at 4:08 PM    /56   Pulse 85   Temp 100.2  F (37.9  C) (Tympanic)   Resp 20   Ht 1.676 m (5' 6\")   Wt 95.8 kg (211 lb 3.2 oz)   SpO2 92%   BMI 34.09 kg/m         "

## 2019-04-01 NOTE — PROGRESS NOTES
Incentive Spirometry education completed.  Pt goal 2600 mls.  Pt achieved 1000 mls.  Pt instructed to perform 10/hr while awake with at least one deep breath and cough per hour until able to perform baseline activity.  RT will follow and re-assess as need.      Debbie Jensen, RT on 4/1/2019 at 2:42 PM

## 2019-04-01 NOTE — PROGRESS NOTES
" [facility] ADMISSION NOTE    Patient admitted to room 302 at approximately 2145 via bed from emergency room. Patient was accompanied by spouse.     Verbal SBAR report received from BAILEY Dahl prior to patient arrival.     Patient ambulated to bed with stand-by assist. Patient alert and oriented X 3. The patient is not having any pain. 0-10 Pain Scale: 7. Admission vital signs: Blood pressure 162/58, pulse 85, temperature 100.5  F (38.1  C), temperature source Tympanic, resp. rate 18, height 1.676 m (5' 6\"), weight 93 kg (205 lb), SpO2 92 %. Patient was oriented to plan of care, call light, bed controls, tv, telephone, bathroom and visiting hours.       Manolo Risk Assessment  Sensory Perception: 4-->no impairment  Moisture: 3-->occasionally moist  Activity: 4-->walks frequently  Mobility: 3-->slightly limited  Nutrition: 3-->adequate  Friction and Shear: 3-->no apparent problem  Manolo Score: 20    Danica Veliz    "

## 2019-04-01 NOTE — PLAN OF CARE
"Patient impulsive and has expressed frustration with not being able to move around the room by himself. Education reinforced that staff is here to help with IV pole/O2 tubing. Saturations are 90-92% on 3 LPM which is new for the patient. No complaints of pain since coming to the floor. 1 PRN imodium given per pt request. Patient's skin is intact but appears pale in color. +2 edema observed bilaterally in the lower legs and feet, pedal pulses are weak. Patient is refusing SCD's at this time.      /58   Pulse 85   Temp 100.5  F (38.1  C) (Tympanic)   Resp 18   Ht 1.676 m (5' 6\")   Wt 93 kg (205 lb)   SpO2 92%   BMI 33.09 kg/m      "

## 2019-04-01 NOTE — PHARMACY-VANCOMYCIN DOSING SERVICE
"Pharmacy Consult- Vancomycin Assessment, Day # 2    Aden Rosa is a 79 year old male admitted on 3/31/2019.    Vancomycin has been ordered per MD, for the indication of: Sepsis (patient immune compromised)    Current Antibiotic Regimen Includes: Vancomycin 2000 mg IVPB every 48 hour, piperacillin/tazobactam  3.375 g IVPB every 6 hours, azithromycin 250 mg every 24 hours    Patient Active Problem List   Diagnosis     Multiple myeloma (H)     Encounter for long-term current use of medication     Stem cells transplant status (H)     Kidney stones     Spinal stenosis     Hypertension     Community acquired pneumonia of right lower lobe of lung (H)     SIRS (systemic inflammatory response syndrome) (H)     Sepsis due to pneumonia (H)       Allergies (and reaction): Hydromet [hydrocodone w/homatropine] and Prochlorperazine maleate    Most recent flowsheet Weight: 95.8 kg (211 lb 3.2 oz)  Most recent flowsheet Height: 167.6 cm (5' 6\")      Intake/Output Summary (Last 24 hours) at 2019 1408  Last data filed at 2019 0838  Gross per 24 hour   Intake 1777 ml   Output 800 ml   Net 977 ml       Tmax = Temp (24hrs), Av.9  F (38.3  C), Min:99.3  F (37.4  C), Max:102.7  F (39.3  C)      estimated creatinine clearance is 36.2 mL/min (A) (based on SCr of 1.82 mg/dL (H)).    Creatinine for last 3 days  3/31/2019:  7:14 PM Creatinine 2.04 mg/dL  2019:  4:12 AM Creatinine 1.82 mg/dL     Nephrotoxins and other renal medications (From now, onward)    Start     Dose/Rate Route Frequency Ordered Stop    19 1500  vancomycin (VANCOCIN) 2,000 mg in sodium chloride 0.9 % 500 mL intermittent infusion      2,000 mg  over 2 Hours Intravenous EVERY 24 HOURS 19 1407      19 1315  acyclovir (ZOVIRAX) capsule 400 mg      400 mg Oral 2 TIMES DAILY 19 1235      19 0200  piperacillin-tazobactam (ZOSYN) infusion 3.375 g      3.375 g  100 mL/hr over 30 Minutes Intravenous EVERY 6 HOURS 19 2202      "       Contrast Orders - past 72 hours (72h ago, onward)    None          Vancomycin IV Administrations (past 72 hours)                   vancomycin (VANCOCIN) 2,000 mg in sodium chloride 0.9 % 500 mL intermittent infusion (mg) 2,000 mg New Bag 03/31/19 1958                Cultures Pending: sputum, blood    Culture Results: Influenza A negative  Influenza B negative  RSV negative    Assessment/Plan: SCr improving. Increase Vancomycin to 2000 mg (~21 mg/kg/dose) IVPB every 24 hours    Goal Trough Level: 15-20 mg/L     Thank You for the consult. Will continue to follow.    Lesley Huerta [facility] ....................  4/1/2019   2:08 PM

## 2019-04-01 NOTE — PHARMACY-ADMISSION MEDICATION HISTORY
Pharmacy -- Admission Medication Reconciliation    Prior to admission (PTA) medications were reviewed and the patient's PTA medication list was updated.    Sources Consulted: patient, wife, Jay    The reliability of this Medication Reconciliation is: Reliability: Reliable    The following significant changes were made:  STOPPED Revlimid  STOPPED Bactrim  ADDED Ninlaro  ADDED acyclovir  ADDED Immodium  ADDED potassium  ADDED amitriptyline  UPDATED administration schedule for Oxycontin and oxycodone (per PDMP patient is very compliant)  UPDATED administration schedule for gabapentin    In addition, the patient's allergies were reviewed with the patient and updated as follows:   Allergies: Homatropine; Homatropine hbr; Prochlorperazine edisylate; and Prochlorperazine maleate    The pharmacist has reviewed with the patient that all personal medications should be removed from the building or locked in the belongings safe.  Patient shall only take medications ordered by the physician and administered by the nursing staff.       Medication barriers identified: patient says a strict schedule helps him maintain side effects of cancer chemotherapy   Medication adherence concerns: none   Understanding of emergency medications: n/a    Soledad Hernández [facility], 4/1/2019,  11:25 AM

## 2019-04-02 LAB
ANION GAP SERPL CALCULATED.3IONS-SCNC: 8 MMOL/L (ref 3–14)
BUN SERPL-MCNC: 19 MG/DL (ref 7–25)
CALCIUM SERPL-MCNC: 7.9 MG/DL (ref 8.6–10.3)
CHLORIDE SERPL-SCNC: 107 MMOL/L (ref 98–107)
CO2 SERPL-SCNC: 24 MMOL/L (ref 21–31)
CREAT SERPL-MCNC: 1.53 MG/DL (ref 0.7–1.3)
DIFFERENTIAL METHOD BLD: ABNORMAL
ERYTHROCYTE [DISTWIDTH] IN BLOOD BY AUTOMATED COUNT: 15.9 % (ref 10–15)
GFR SERPL CREATININE-BSD FRML MDRD: 44 ML/MIN/{1.73_M2}
GLUCOSE SERPL-MCNC: 85 MG/DL (ref 70–105)
HCT VFR BLD AUTO: 32.2 % (ref 40–53)
HGB BLD-MCNC: 10.7 G/DL (ref 13.3–17.7)
MCH RBC QN AUTO: 34.5 PG (ref 26.5–33)
MCHC RBC AUTO-ENTMCNC: 33.2 G/DL (ref 31.5–36.5)
MCV RBC AUTO: 104 FL (ref 78–100)
PLATELET # BLD AUTO: 65 10E9/L (ref 150–450)
POTASSIUM SERPL-SCNC: 3.8 MMOL/L (ref 3.5–5.1)
RBC # BLD AUTO: 3.1 10E12/L (ref 4.4–5.9)
SODIUM SERPL-SCNC: 139 MMOL/L (ref 134–144)
VANCOMYCIN SERPL-MCNC: 12.5 UG/ML (ref 7–45)
WBC # BLD AUTO: 3 10E9/L (ref 4–11)

## 2019-04-02 PROCEDURE — 85025 COMPLETE CBC W/AUTO DIFF WBC: CPT | Performed by: FAMILY MEDICINE

## 2019-04-02 PROCEDURE — A9270 NON-COVERED ITEM OR SERVICE: HCPCS | Mod: GY | Performed by: FAMILY MEDICINE

## 2019-04-02 PROCEDURE — 40000275 ZZH STATISTIC RCP TIME EA 10 MIN

## 2019-04-02 PROCEDURE — 80202 ASSAY OF VANCOMYCIN: CPT | Performed by: FAMILY MEDICINE

## 2019-04-02 PROCEDURE — 25800030 ZZH RX IP 258 OP 636: Performed by: FAMILY MEDICINE

## 2019-04-02 PROCEDURE — 12000000 ZZH R&B MED SURG/OB

## 2019-04-02 PROCEDURE — 36415 COLL VENOUS BLD VENIPUNCTURE: CPT | Performed by: FAMILY MEDICINE

## 2019-04-02 PROCEDURE — 25000132 ZZH RX MED GY IP 250 OP 250 PS 637: Mod: GY | Performed by: FAMILY MEDICINE

## 2019-04-02 PROCEDURE — 80048 BASIC METABOLIC PNL TOTAL CA: CPT | Performed by: FAMILY MEDICINE

## 2019-04-02 PROCEDURE — 94640 AIRWAY INHALATION TREATMENT: CPT

## 2019-04-02 PROCEDURE — 99232 SBSQ HOSP IP/OBS MODERATE 35: CPT | Performed by: FAMILY MEDICINE

## 2019-04-02 PROCEDURE — 25000128 H RX IP 250 OP 636: Performed by: FAMILY MEDICINE

## 2019-04-02 PROCEDURE — 25000125 ZZHC RX 250: Performed by: FAMILY MEDICINE

## 2019-04-02 RX ORDER — SODIUM CHLORIDE 9 MG/ML
INJECTION, SOLUTION INTRAVENOUS CONTINUOUS
Status: DISCONTINUED | OUTPATIENT
Start: 2019-04-02 | End: 2019-04-06

## 2019-04-02 RX ORDER — IPRATROPIUM BROMIDE AND ALBUTEROL SULFATE 2.5; .5 MG/3ML; MG/3ML
3 SOLUTION RESPIRATORY (INHALATION)
Status: DISCONTINUED | OUTPATIENT
Start: 2019-04-02 | End: 2019-04-07 | Stop reason: HOSPADM

## 2019-04-02 RX ORDER — ALBUTEROL SULFATE 0.83 MG/ML
2.5 SOLUTION RESPIRATORY (INHALATION)
Status: DISCONTINUED | OUTPATIENT
Start: 2019-04-02 | End: 2019-04-07 | Stop reason: HOSPADM

## 2019-04-02 RX ORDER — ACETAMINOPHEN 325 MG/1
650 TABLET ORAL EVERY 4 HOURS PRN
Status: DISCONTINUED | OUTPATIENT
Start: 2019-04-02 | End: 2019-04-07 | Stop reason: HOSPADM

## 2019-04-02 RX ADMIN — TAZOBACTAM SODIUM AND PIPERACILLIN SODIUM 3.38 G: 375; 3 INJECTION, SOLUTION INTRAVENOUS at 01:08

## 2019-04-02 RX ADMIN — GABAPENTIN 600 MG: 600 TABLET, FILM COATED ORAL at 06:50

## 2019-04-02 RX ADMIN — GABAPENTIN 600 MG: 600 TABLET, FILM COATED ORAL at 18:54

## 2019-04-02 RX ADMIN — IPRATROPIUM BROMIDE AND ALBUTEROL SULFATE 3 ML: .5; 3 SOLUTION RESPIRATORY (INHALATION) at 21:20

## 2019-04-02 RX ADMIN — AMITRIPTYLINE HYDROCHLORIDE 10 MG: 10 TABLET, FILM COATED ORAL at 18:55

## 2019-04-02 RX ADMIN — SODIUM CHLORIDE, SODIUM LACTATE, POTASSIUM CHLORIDE, AND CALCIUM CHLORIDE: 600; 310; 30; 20 INJECTION, SOLUTION INTRAVENOUS at 15:29

## 2019-04-02 RX ADMIN — OXYCODONE HYDROCHLORIDE 20 MG: 20 TABLET, FILM COATED, EXTENDED RELEASE ORAL at 06:50

## 2019-04-02 RX ADMIN — ASPIRIN 81 MG: 81 TABLET, COATED ORAL at 06:50

## 2019-04-02 RX ADMIN — LOPERAMIDE HYDROCHLORIDE 2 MG: 2 CAPSULE ORAL at 06:51

## 2019-04-02 RX ADMIN — TAZOBACTAM SODIUM AND PIPERACILLIN SODIUM 3.38 G: 375; 3 INJECTION, SOLUTION INTRAVENOUS at 07:37

## 2019-04-02 RX ADMIN — ACETAMINOPHEN 650 MG: 325 TABLET, FILM COATED ORAL at 09:35

## 2019-04-02 RX ADMIN — OXYCODONE HYDROCHLORIDE 10 MG: 10 TABLET, FILM COATED, EXTENDED RELEASE ORAL at 11:59

## 2019-04-02 RX ADMIN — SODIUM CHLORIDE: 9 INJECTION, SOLUTION INTRAVENOUS at 23:00

## 2019-04-02 RX ADMIN — AZITHROMYCIN MONOHYDRATE 250 MG: 500 INJECTION, POWDER, LYOPHILIZED, FOR SOLUTION INTRAVENOUS at 23:01

## 2019-04-02 RX ADMIN — OXYCODONE HYDROCHLORIDE 10 MG: 10 TABLET, FILM COATED, EXTENDED RELEASE ORAL at 18:55

## 2019-04-02 RX ADMIN — ACYCLOVIR 400 MG: 200 CAPSULE ORAL at 18:55

## 2019-04-02 RX ADMIN — TAZOBACTAM SODIUM AND PIPERACILLIN SODIUM 4.5 G: 500; 4 INJECTION, SOLUTION INTRAVENOUS at 13:47

## 2019-04-02 RX ADMIN — VANCOMYCIN HYDROCHLORIDE 2000 MG: 1 INJECTION, POWDER, LYOPHILIZED, FOR SOLUTION INTRAVENOUS at 15:29

## 2019-04-02 RX ADMIN — GABAPENTIN 900 MG: 300 CAPSULE ORAL at 12:02

## 2019-04-02 RX ADMIN — ACYCLOVIR 400 MG: 200 CAPSULE ORAL at 06:50

## 2019-04-02 RX ADMIN — LOPERAMIDE HYDROCHLORIDE 2 MG: 2 CAPSULE ORAL at 18:54

## 2019-04-02 RX ADMIN — TAZOBACTAM SODIUM AND PIPERACILLIN SODIUM 4.5 G: 500; 4 INJECTION, SOLUTION INTRAVENOUS at 20:48

## 2019-04-02 RX ADMIN — TAMSULOSIN HYDROCHLORIDE 0.4 MG: 0.4 CAPSULE ORAL at 20:48

## 2019-04-02 RX ADMIN — PANTOPRAZOLE SODIUM 40 MG: 40 TABLET, DELAYED RELEASE ORAL at 06:50

## 2019-04-02 ASSESSMENT — ACTIVITIES OF DAILY LIVING (ADL)
ADLS_ACUITY_SCORE: 13

## 2019-04-02 NOTE — PHARMACY
Pharmacy- Renal Dose Adjustment    Patient Active Problem List   Diagnosis     Multiple myeloma (H)     Encounter for long-term current use of medication     Stem cells transplant status (H)     Kidney stones     Spinal stenosis     Hypertension     Community acquired pneumonia of right lower lobe of lung (H)     SIRS (systemic inflammatory response syndrome) (H)     Sepsis due to pneumonia (H)        Relevant Labs:  Recent Labs   Lab Test 04/02/19  0408 04/01/19  0412   WBC 3.0* 2.2*   HGB 10.7* 10.0*   PLT 65* 60*        CrCl: 43 ml/min      Intake/Output Summary (Last 24 hours) at 4/2/2019 1216  Last data filed at 4/2/2019 1158  Gross per 24 hour   Intake 3831 ml   Output 650 ml   Net 3181 ml       Estimated Cr Cl is greater than 40 ml/min, sepsis, respiratory, per Renal Dose Adjustment Protocol, will adjust piperacillin/tazobactam to 4.5 g IVPB every 6 hours.    Will continue to follow and make adjustments accordingly. Thank You.    Lesley Huerta [facility] ....................  4/2/2019   12:16 PM

## 2019-04-02 NOTE — PHARMACY-VANCOMYCIN DOSING SERVICE
"Pharmacy Consult- Vancomycin Assessment, Day # 3    Aden Rosa is a 79 year old male admitted on 3/31/2019.    Vancomycin has been ordered per MD, for the indication of: sepsis    Current Antibiotic Regimen Includes: azithromycin, vancomycin and piperacillin/tazobactam    Patient Active Problem List   Diagnosis     Multiple myeloma (H)     Encounter for long-term current use of medication     Stem cells transplant status (H)     Kidney stones     Spinal stenosis     Hypertension     Community acquired pneumonia of right lower lobe of lung (H)     SIRS (systemic inflammatory response syndrome) (H)     Sepsis due to pneumonia (H)       Allergies (and reaction): Hydromet [hydrocodone w/homatropine] and Prochlorperazine maleate    Most recent flowsheet Weight: 95.8 kg (211 lb 3.2 oz)  Most recent flowsheet Height: 167.6 cm (5' 6\")      Intake/Output Summary (Last 24 hours) at 2019 1508  Last data filed at 2019 1246  Gross per 24 hour   Intake 3141 ml   Output 650 ml   Net 2491 ml       Tmax = Temp (24hrs), Av.3  F (37.9  C), Min:98.1  F (36.7  C), Max:101.9  F (38.8  C)      estimated creatinine clearance is 43.1 mL/min (A) (based on SCr of 1.53 mg/dL (H)).    Creatinine for last 3 days  3/31/2019:  7:14 PM Creatinine 2.04 mg/dL  2019:  4:12 AM Creatinine 1.82 mg/dL  2019:  4:08 AM Creatinine 1.53 mg/dL     Nephrotoxins and other renal medications (From now, onward)    Start     Dose/Rate Route Frequency Ordered Stop    19 1600  vancomycin (VANCOCIN) 2,000 mg in sodium chloride 0.9 % 500 mL intermittent infusion      2,000 mg  over 2 Hours Intravenous EVERY 18 HOURS 19 1504      19 1400  piperacillin-tazobactam (ZOSYN) intermittent infusion 4.5 g      4.5 g  200 mL/hr over 30 Minutes Intravenous EVERY 6 HOURS 19 1209      19 1315  acyclovir (ZOVIRAX) capsule 400 mg      400 mg Oral 2 TIMES DAILY 19 1235            Contrast Orders - past 72 hours (72h ago, " onward)    None          Vancomycin IV Administrations (past 72 hours)                   vancomycin (VANCOCIN) 2,000 mg in sodium chloride 0.9 % 500 mL intermittent infusion (mg) 2,000 mg New Bag 04/01/19 2048    vancomycin (VANCOCIN) 2,000 mg in sodium chloride 0.9 % 500 mL intermittent infusion (mg) 2,000 mg New Bag 03/31/19 1958                Cultures Pending: sputum, blood    Vancomycin level ~ 6 hours prior to dose was 12.5 mcg/ml; not quite at steady state due to changing renal function, and changing frequency  SCr improving  Current half life ~ 9.9 hours  Vd= 67 liter  Vd/l= 0.7 l/kg  Extrapolated trough about 10 mcg/ml    Assessment/Plan: Increase vancomycin to 2000 mg IVPB every 18 hours. Give next dose now.  Goal Trough Level: 15-20 mg/L       Thank You for the consult. Will continue to follow.    Lesley Huerta [facility] ....................  4/2/2019   3:08 PM

## 2019-04-02 NOTE — PLAN OF CARE
"Respiratory Compromise (Pneumonia)  Effective Oxygenation and Ventilation  4/2/2019 0757 by Gretta Sawyer, RN  Note  Pt A&O x4, calm and cooperative. LS are diminished with wheezes in the bases, Pt satting 86% on 4L O2 via NC. Pt increased to 5L, satting 88-89%. Pt noted to be mouth breathing especially when asleep. NC placed in pt's mouth, but pt stated that it annoyed him. Writer educated pt to breath through nose when awake, pt stated he understands. Pt reported his nose, throat, and mouth are dry. Humidification provided. Will continue to monitor.   Gretta Sawyer, RN on 4/2/2019 at 8:07 AM    /47   Pulse 85   Temp 101.9  F (38.8  C) (Tympanic)   Resp 18   Ht 1.676 m (5' 6\")   Wt 95.8 kg (211 lb 3.2 oz)   SpO2 90%   BMI 34.09 kg/m         "

## 2019-04-02 NOTE — PLAN OF CARE
"Patient saturation's were 85% on 3 LPM while sleeping, O2 was increased to 4 LPM and saturations are 89-92%. Temps was 101.9 then came down to 100.2 after blankets were removed from bed. Crackles were auscultated bilaterally in the lungs and then on second assessment expiatory wheezes were heard in both bases. /56   Pulse 85   Temp 100.2  F (37.9  C) (Tympanic)   Resp 20   Ht 1.676 m (5' 6\")   Wt 95.8 kg (211 lb 3.2 oz)   SpO2 90%   BMI 34.09 kg/m      "

## 2019-04-02 NOTE — PLAN OF CARE
"Alert and oriented, able to make needs known, independent with ambulation and bed mobility however does require cues to position self better on bed through shift or will lay crooked and appear uncomfortable. Elevated feet for approximately 20 min this evening due to edema, skin is clean, dry and intact, had a sputum collection in the collection jar but he and his wife reported it was from yesterday, this was discarded and he was given a new sputum collection container. Continues to have a cough but is dry and non-productive at this time. Crackles and wheezes throughout. Continues to have loose stools periodically throughout the day.     /56   Pulse 85   Temp 98.8  F (37.1  C) (Tympanic)   Resp 16   Ht 1.676 m (5' 6\")   Wt 95.8 kg (211 lb 3.2 oz)   SpO2 94%   BMI 34.09 kg/m      Farideh Craft RN on 4/2/2019 at 6:48 PM    "

## 2019-04-02 NOTE — PHARMACY-VANCOMYCIN DOSING SERVICE
"Pharmacy Consult- Vancomycin Assessment, Day # 3    Aden Rsoa is a 79 year old male admitted on 3/31/2019.    Vancomycin has been ordered per MD, for the indication of: sepsis, immune suppressed    Current Antibiotic Regimen Includes: Vancomycin, piperacillin/tazobactam, azithromycin    Patient Active Problem List   Diagnosis     Multiple myeloma (H)     Encounter for long-term current use of medication     Stem cells transplant status (H)     Kidney stones     Spinal stenosis     Hypertension     Community acquired pneumonia of right lower lobe of lung (H)     SIRS (systemic inflammatory response syndrome) (H)     Sepsis due to pneumonia (H)       Allergies (and reaction): Hydromet [hydrocodone w/homatropine] and Prochlorperazine maleate    Most recent flowsheet Weight: 95.8 kg (211 lb 3.2 oz)  Most recent flowsheet Height: 167.6 cm (5' 6\")      Intake/Output Summary (Last 24 hours) at 2019 1206  Last data filed at 2019 1158  Gross per 24 hour   Intake 3831 ml   Output 650 ml   Net 3181 ml       Tmax = Temp (24hrs), Av.3  F (37.9  C), Min:98.1  F (36.7  C), Max:101.9  F (38.8  C)      estimated creatinine clearance is 43.1 mL/min (A) (based on SCr of 1.53 mg/dL (H)).    Creatinine for last 3 days  3/31/2019:  7:14 PM Creatinine 2.04 mg/dL  2019:  4:12 AM Creatinine 1.82 mg/dL  2019:  4:08 AM Creatinine 1.53 mg/dL     Nephrotoxins and other renal medications (From now, onward)    Start     Dose/Rate Route Frequency Ordered Stop    19  vancomycin (VANCOCIN) 2,000 mg in sodium chloride 0.9 % 500 mL intermittent infusion      2,000 mg  over 2 Hours Intravenous EVERY 24 HOURS 19 1407      19 1315  acyclovir (ZOVIRAX) capsule 400 mg      400 mg Oral 2 TIMES DAILY 19 1235      19 0200  piperacillin-tazobactam (ZOSYN) infusion 3.375 g      3.375 g  100 mL/hr over 30 Minutes Intravenous EVERY 6 HOURS 19 2202            Contrast Orders - past 72 hours (72h " ago, onward)    None          Vancomycin IV Administrations (past 72 hours)                   vancomycin (VANCOCIN) 2,000 mg in sodium chloride 0.9 % 500 mL intermittent infusion (mg) 2,000 mg New Bag 04/01/19 2048    vancomycin (VANCOCIN) 2,000 mg in sodium chloride 0.9 % 500 mL intermittent infusion (mg) 2,000 mg New Bag 03/31/19 1958                Cultures Pending: Sputum, blood x2    Culture Results: Influenza A negative, Influenza B negative, RSV negative    Assessment/Plan: check vancomycin random level today due to SCr changing    Goal Trough Level: 15-20 mg/L       Thank You for the consult. Will continue to follow.    Lesley Huerta [facility] ....................  4/2/2019   12:06 PM

## 2019-04-02 NOTE — PROGRESS NOTES
[facility]    Medicine Progress Note - Hospitalist Service       Date of Admission:  3/31/2019  Assessment & Plan         Aden Rosa is a 79 year old male who presents with weakness.  Seen in [place] days prior to admission and diagnosed with right-sided pneumonia, started on Levaquin, with acute worsening on the day of admission.  Admitted and started on Zosyn and vancomycin.  Reports interval improvement in fatigue and general feeling of illness although now requiring 5 L of oxygen.    Renal function has improved.  Currently euvolemic versus slightly hypervolemic.  Will TKO fluids.  This could also be contributing to worsening hypoxia.  I am expecting improvement in respiratory status over the next 24 hours.  If slow to respond could consider one-time dose of Lasix for diagnostic and therapeutic purposes, although I suspect this would be more third spacing than arsenio heart failure.    Principal Problem:  Sepsis    Assessment: Still stable now today with subjective improvement.  White blood cell count has continued to increase slightly while hemoglobin and platelets stable/slightly increased.     Plan: continue current broad-spectrum ABx and supportive care  Community acquired pneumonia of right lower lobe of lung (H)    Assessment: Immunosuppressed patient.  Last chemo 13 days ago.    Plan: Will continue fluids and antibiotics as started in the emergency department.  Monitor renal status carefully.    Active Problems:  Multiple myeloma (H)    Assessment: Follows with oncology in [place].  Currently ixazomib oral chemotherapy 3 weeks on and one-week off.  Last dose 3/20/2019.    Plan: Follow CBC.  Stem cells transplant status (H)    Assessment: As above; has been stable    Plan: Continue to monitor carefully  Hypertension    Assessment: Currently not requiring medication, per patient blood pressure has been up and down recently and his primary physician is considering restarting  medication.  Improved compared with yesterday.    Plan: Follow vital signs       Diet: Combination Diet Regular Diet Adult    DVT Prophylaxis: Pneumatic Compression Devices  Guevara Catheter: not present  Code Status: Full Code      Disposition Plan   Expected discharge: 2 - 3 days, recommended to prior living arrangement once adequate pain management/ tolerating PO medications, antibiotic plan established and O2 use less than 2 liters/minute.  Entered: Satish Alvarado MD 04/02/2019, 9:45 AM       The patient's care was discussed with the Patient.    Satish Alvarado MD  Hospitalist Service  [facility]    ______________________________________________________________________    Interval History   Patient reports that he feels better than yesterday.  Pain has improved dramatically with restart of his narcotics and essentially has resolved.  Shortness of breath feels better to him although his supplemental oxygen was increased to 5 L/min due to hypoxia.  He feels less weak and tired.  Did have a fever again this morning.  Eating well.  Bowel movements have normalized with restart of Imodium.  He tells me he is urinating frequently.    Data reviewed today: I reviewed all medications, new labs and imaging results over the last 24 hours. I personally reviewed no images or EKG's today.    Physical Exam   Vital Signs: Temp: 101  F (38.3  C) Temp src: Tympanic BP: 141/47   Heart Rate: 76 Resp: 18 SpO2: (!) 89 % O2 Device: Nasal cannula Oxygen Delivery: 5 LPM  Weight: 211 lbs 3.21 oz  Constitutional: awake, alert, cooperative, no apparent distress, and appears stated age  Respiratory: No increased work of breathing.  Right sided rhonchi with improvement since yesterday.  No focal consolidation.  Perhaps very mild bilateral basilar crackles but they clear with deep inspiration.  Cardiovascular: Regular rate and rhythm.  No murmurs rubs or gallops.  Trace bilateral lower extremity edema to upper  ankle.  GI: Abdomen soft, nontender  Neuropsychiatric: General: normal, calm and normal eye contact  Orientation: oriented to self, place, time and situation  Memory and insight: normal, memory for past and recent events intact and thought process normal    Data   Recent Labs   Lab 04/02/19  0408 04/01/19 0412 03/31/19  1914   WBC 3.0* 2.2* 2.0*   HGB 10.7* 10.0* 11.3*   * 105* 104*   PLT 65* 60* 83*    140 134   POTASSIUM 3.8 3.9 3.8   CHLORIDE 107 107 102   CO2 24 27 22   BUN 19 28* 31*   CR 1.53* 1.82* 2.04*   ANIONGAP 8 6 10   OLAYINKA 7.9* 7.8* 8.5*   GLC 85 104 113*   ALBUMIN  --   --  3.4*   PROTTOTAL  --   --  6.9   BILITOTAL  --   --  0.8   ALKPHOS  --   --  44   ALT  --   --  50   AST  --   --  151*     No results found for this or any previous visit (from the past 24 hour(s)).

## 2019-04-03 LAB
ALBUMIN SERPL-MCNC: 2.7 G/DL (ref 3.5–5.7)
ALP SERPL-CCNC: 33 U/L (ref 34–104)
ALT SERPL W P-5'-P-CCNC: 37 U/L (ref 7–52)
ANION GAP SERPL CALCULATED.3IONS-SCNC: 9 MMOL/L (ref 3–14)
AST SERPL W P-5'-P-CCNC: 110 U/L (ref 13–39)
BASOPHILS # BLD AUTO: 0 10E9/L (ref 0–0.2)
BASOPHILS NFR BLD AUTO: 0 %
BILIRUB SERPL-MCNC: 0.8 MG/DL (ref 0.3–1)
BUN SERPL-MCNC: 15 MG/DL (ref 7–25)
CALCIUM SERPL-MCNC: 7.8 MG/DL (ref 8.6–10.3)
CHLORIDE SERPL-SCNC: 106 MMOL/L (ref 98–107)
CO2 SERPL-SCNC: 25 MMOL/L (ref 21–31)
CREAT SERPL-MCNC: 1.45 MG/DL (ref 0.7–1.3)
DIFFERENTIAL METHOD BLD: ABNORMAL
EOSINOPHIL # BLD AUTO: 0 10E9/L (ref 0–0.7)
EOSINOPHIL NFR BLD AUTO: 0 %
ERYTHROCYTE [DISTWIDTH] IN BLOOD BY AUTOMATED COUNT: 15.9 % (ref 10–15)
GFR SERPL CREATININE-BSD FRML MDRD: 47 ML/MIN/{1.73_M2}
GLUCOSE SERPL-MCNC: 114 MG/DL (ref 70–105)
HCT VFR BLD AUTO: 31.1 % (ref 40–53)
HGB BLD-MCNC: 10.3 G/DL (ref 13.3–17.7)
LYMPHOCYTES # BLD AUTO: 0.4 10E9/L (ref 0.8–5.3)
LYMPHOCYTES NFR BLD AUTO: 15 %
MACROCYTES BLD QL SMEAR: PRESENT
MCH RBC QN AUTO: 34.6 PG (ref 26.5–33)
MCHC RBC AUTO-ENTMCNC: 33.1 G/DL (ref 31.5–36.5)
MCV RBC AUTO: 104 FL (ref 78–100)
MONOCYTES # BLD AUTO: 0 10E9/L (ref 0–1.3)
MONOCYTES NFR BLD AUTO: 1 %
NEUTROPHILS # BLD AUTO: 2.4 10E9/L (ref 1.6–8.3)
NEUTROPHILS NFR BLD AUTO: 84 %
PLATELET # BLD AUTO: 55 10E9/L (ref 150–450)
PLATELET # BLD EST: ABNORMAL 10*3/UL
POIKILOCYTOSIS BLD QL SMEAR: SLIGHT
POTASSIUM SERPL-SCNC: 3.3 MMOL/L (ref 3.5–5.1)
PROCALCITONIN SERPL-MCNC: 0.5 NG/ML
PROT SERPL-MCNC: 5.7 G/DL (ref 6.4–8.9)
RBC # BLD AUTO: 2.98 10E12/L (ref 4.4–5.9)
RBC MORPH BLD: ABNORMAL
SODIUM SERPL-SCNC: 140 MMOL/L (ref 134–144)
WBC # BLD AUTO: 2.8 10E9/L (ref 4–11)

## 2019-04-03 PROCEDURE — 40000275 ZZH STATISTIC RCP TIME EA 10 MIN

## 2019-04-03 PROCEDURE — 25800030 ZZH RX IP 258 OP 636: Performed by: FAMILY MEDICINE

## 2019-04-03 PROCEDURE — A9270 NON-COVERED ITEM OR SERVICE: HCPCS | Mod: GY | Performed by: FAMILY MEDICINE

## 2019-04-03 PROCEDURE — 25000132 ZZH RX MED GY IP 250 OP 250 PS 637: Mod: GY | Performed by: FAMILY MEDICINE

## 2019-04-03 PROCEDURE — 12000000 ZZH R&B MED SURG/OB

## 2019-04-03 PROCEDURE — 84145 PROCALCITONIN (PCT): CPT | Performed by: FAMILY MEDICINE

## 2019-04-03 PROCEDURE — 25000128 H RX IP 250 OP 636: Performed by: FAMILY MEDICINE

## 2019-04-03 PROCEDURE — 99232 SBSQ HOSP IP/OBS MODERATE 35: CPT | Performed by: FAMILY MEDICINE

## 2019-04-03 PROCEDURE — 94640 AIRWAY INHALATION TREATMENT: CPT

## 2019-04-03 PROCEDURE — 94640 AIRWAY INHALATION TREATMENT: CPT | Mod: 76

## 2019-04-03 PROCEDURE — 85025 COMPLETE CBC W/AUTO DIFF WBC: CPT | Performed by: FAMILY MEDICINE

## 2019-04-03 PROCEDURE — 25000125 ZZHC RX 250: Performed by: FAMILY MEDICINE

## 2019-04-03 PROCEDURE — 36415 COLL VENOUS BLD VENIPUNCTURE: CPT | Performed by: FAMILY MEDICINE

## 2019-04-03 PROCEDURE — 80053 COMPREHEN METABOLIC PANEL: CPT | Performed by: FAMILY MEDICINE

## 2019-04-03 RX ORDER — FUROSEMIDE 10 MG/ML
20 INJECTION INTRAMUSCULAR; INTRAVENOUS ONCE
Status: COMPLETED | OUTPATIENT
Start: 2019-04-03 | End: 2019-04-03

## 2019-04-03 RX ADMIN — OXYCODONE HYDROCHLORIDE 10 MG: 10 TABLET, FILM COATED, EXTENDED RELEASE ORAL at 18:53

## 2019-04-03 RX ADMIN — TAZOBACTAM SODIUM AND PIPERACILLIN SODIUM 4.5 G: 500; 4 INJECTION, SOLUTION INTRAVENOUS at 19:58

## 2019-04-03 RX ADMIN — GABAPENTIN 900 MG: 300 CAPSULE ORAL at 11:48

## 2019-04-03 RX ADMIN — PANTOPRAZOLE SODIUM 40 MG: 40 TABLET, DELAYED RELEASE ORAL at 06:51

## 2019-04-03 RX ADMIN — GABAPENTIN 600 MG: 600 TABLET, FILM COATED ORAL at 06:51

## 2019-04-03 RX ADMIN — VANCOMYCIN HYDROCHLORIDE 2000 MG: 1 INJECTION, POWDER, LYOPHILIZED, FOR SOLUTION INTRAVENOUS at 10:08

## 2019-04-03 RX ADMIN — TAZOBACTAM SODIUM AND PIPERACILLIN SODIUM 4.5 G: 500; 4 INJECTION, SOLUTION INTRAVENOUS at 03:54

## 2019-04-03 RX ADMIN — IPRATROPIUM BROMIDE AND ALBUTEROL SULFATE 3 ML: .5; 3 SOLUTION RESPIRATORY (INHALATION) at 14:21

## 2019-04-03 RX ADMIN — AMITRIPTYLINE HYDROCHLORIDE 10 MG: 10 TABLET, FILM COATED ORAL at 18:52

## 2019-04-03 RX ADMIN — OXYCODONE HYDROCHLORIDE 10 MG: 10 TABLET, FILM COATED, EXTENDED RELEASE ORAL at 11:48

## 2019-04-03 RX ADMIN — GABAPENTIN 600 MG: 600 TABLET, FILM COATED ORAL at 18:54

## 2019-04-03 RX ADMIN — LOPERAMIDE HYDROCHLORIDE 2 MG: 2 CAPSULE ORAL at 18:53

## 2019-04-03 RX ADMIN — TAZOBACTAM SODIUM AND PIPERACILLIN SODIUM 4.5 G: 500; 4 INJECTION, SOLUTION INTRAVENOUS at 15:45

## 2019-04-03 RX ADMIN — ASPIRIN 81 MG: 81 TABLET, COATED ORAL at 06:50

## 2019-04-03 RX ADMIN — ACYCLOVIR 400 MG: 200 CAPSULE ORAL at 06:50

## 2019-04-03 RX ADMIN — IPRATROPIUM BROMIDE AND ALBUTEROL SULFATE 3 ML: .5; 3 SOLUTION RESPIRATORY (INHALATION) at 18:04

## 2019-04-03 RX ADMIN — TAMSULOSIN HYDROCHLORIDE 0.4 MG: 0.4 CAPSULE ORAL at 19:57

## 2019-04-03 RX ADMIN — TAZOBACTAM SODIUM AND PIPERACILLIN SODIUM 4.5 G: 500; 4 INJECTION, SOLUTION INTRAVENOUS at 07:56

## 2019-04-03 RX ADMIN — LOPERAMIDE HYDROCHLORIDE 2 MG: 2 CAPSULE ORAL at 06:51

## 2019-04-03 RX ADMIN — ACYCLOVIR 400 MG: 200 CAPSULE ORAL at 18:54

## 2019-04-03 RX ADMIN — OXYCODONE HYDROCHLORIDE 20 MG: 20 TABLET, FILM COATED, EXTENDED RELEASE ORAL at 06:51

## 2019-04-03 RX ADMIN — FUROSEMIDE 20 MG: 10 INJECTION, SOLUTION INTRAMUSCULAR; INTRAVENOUS at 09:23

## 2019-04-03 RX ADMIN — AZITHROMYCIN MONOHYDRATE 250 MG: 500 INJECTION, POWDER, LYOPHILIZED, FOR SOLUTION INTRAVENOUS at 21:37

## 2019-04-03 RX ADMIN — IPRATROPIUM BROMIDE AND ALBUTEROL SULFATE 3 ML: .5; 3 SOLUTION RESPIRATORY (INHALATION) at 06:22

## 2019-04-03 ASSESSMENT — ACTIVITIES OF DAILY LIVING (ADL)
ADLS_ACUITY_SCORE: 13

## 2019-04-03 ASSESSMENT — MIFFLIN-ST. JEOR
SCORE: 1638.89
SCORE: 1658.75

## 2019-04-03 NOTE — PROGRESS NOTES
[facility]    Medicine Progress Note - Hospitalist Service       Date of Admission:  3/31/2019  Assessment & Plan            Aden Rosa is a 79 year old male who presents with weakness.  Seen in [place] days prior to admission and diagnosed with right-sided pneumonia, started on Levaquin, with acute worsening on the day of admission.  Admitted and started on Zosyn and vancomycin.  Reports interval improvement in fatigue and shortness of breath.  Still requiring 4 L of oxygen.    Renal function has improved.  Currently hypervolemic and will treat with 1 dose of Lasix.  This could also be contributing to ongoing hypoxia.  Suggest high calorie snacks as nutritional status poor.  Calcium normal when corrected for hypoalbuminemia.    Principal Problem:  Sepsis    Assessment: Slow improvement in symptoms.     Plan: continue current broad-spectrum ABx and supportive care  Community acquired pneumonia of right lower lobe of lung (H)    Assessment: Immunosuppressed patient.  Last chemo 14 days ago.    Plan: Will continue fluids and antibiotics as started in the emergency department.  Monitor renal status carefully.    Active Problems:  Multiple myeloma (H)    Assessment: Follows with oncology in [place].  Currently ixazomib oral chemotherapy 3 weeks on and one-week off.  Last dose 3/20/2019.    Plan: Follow CBC.  Stem cells transplant status (H)    Assessment: As above; has been stable    Plan: Continue to monitor carefully  Hypertension    Assessment: Currently not requiring medication, per patient blood pressure has been up and down recently and his primary physician is considering restarting medication.      Plan: Follow vital signs      Diet: Combination Diet Regular Diet Adult    DVT Prophylaxis: Pneumatic Compression Devices  Guevara Catheter: not present  Code Status: Full Code      Disposition Plan   Expected discharge: 2 - 3 days, recommended to prior living arrangement once antibiotic plan  established, O2 use less than 2 liters/minute and SIRS/Sepsis treated.  Entered: Satish Alvarado MD 04/03/2019, 10:06 AM       The patient's care was discussed with the Patient.    Satish Alvarado MD  Hospitalist Service  [facility]    ______________________________________________________________________    Interval History   Patient reports slow improvement since yesterday.  His appetite has improved and he is eating well this morning.  Feels his shortness of breath is perhaps a little better.  Has noticed some additional swelling in his legs.  Cough remains minimally productive.  Denies any new or recurrent chest pain.  He reports he is urinating normally.  Bowel movements have been fairly consistent with his regular pattern.  Pain is well controlled.  No fevers overnight.    Data reviewed today: I reviewed all medications, new labs and imaging results over the last 24 hours. I personally reviewed no images or EKG's today.    Physical Exam   Vital Signs: Temp: 99.7  F (37.6  C) Temp src: Temporal BP: 162/60 Pulse: 66 Heart Rate: 65 Resp: 18 SpO2: 90 % O2 Device: Nasal cannula with humidification Oxygen Delivery: 4 LPM  Weight: 215 lbs 3.2 oz  Constitutional: awake, alert, cooperative, no apparent distress, and appears stated age  Respiratory: No increased work of breathing.  Right sided rhonchi, improved from yesterday.  Left side clear.  Currently no wheezing.  Cardiovascular: Regular rate and rhythm.  No murmurs or.  Patient has 1+ bilateral lower extremity edema to mid shin  GI: No scars, normal bowel sounds, soft, non-distended, non-tender, no masses palpated, no hepatosplenomegally  Neuropsychiatric: General: normal, calm and normal eye contact  Orientation: oriented to self, place, time and situation  Memory and insight: normal, memory for past and recent events intact and thought process normal    Data   Recent Labs   Lab 04/03/19  0745 04/02/19  0408 04/01/19  0412 03/31/19  6913    WBC 2.8* 3.0* 2.2* 2.0*   HGB 10.3* 10.7* 10.0* 11.3*   * 104* 105* 104*   PLT 55* 65* 60* 83*    139 140 134   POTASSIUM 3.3* 3.8 3.9 3.8   CHLORIDE 106 107 107 102   CO2 25 24 27 22   BUN 15 19 28* 31*   CR 1.45* 1.53* 1.82* 2.04*   ANIONGAP 9 8 6 10   OLAYINKA 7.8* 7.9* 7.8* 8.5*   * 85 104 113*   ALBUMIN 2.7*  --   --  3.4*   PROTTOTAL 5.7*  --   --  6.9   BILITOTAL 0.8  --   --  0.8   ALKPHOS 33*  --   --  44   ALT 37  --   --  50   *  --   --  151*     No results found for this or any previous visit (from the past 24 hour(s)).

## 2019-04-03 NOTE — PLAN OF CARE
Infection (Pneumonia)  Resolution of Infection Signs/Symptoms    Patient continues to need 4L Oxygen to maintain Spo2 of >90%. Lungs with expiratory wheezes throughout. Congested, nonproductive cough present. Duoneb administered at HS with slight improvement with lung sounds. RR 20-22. Appears dyspneic with exertion. Encouraged coughing and deep breathing. Using IS independently. Will continue to monitor and implement interventions as needed.   Una Bangura RN on 4/3/2019 at 4:39 AM

## 2019-04-03 NOTE — PHARMACY-VANCOMYCIN DOSING SERVICE
"Pharmacy Consult- Vancomycin Assessment, Day # 4    Aden Rosa is a 79 year old male admitted on 3/31/2019.    Vancomycin has been ordered per MD, for the indication of: sepsis, immune compromised    Current Antibiotic Regimen Includes: azithromycin, vancomycin and piperacillin/tazobactam    Patient Active Problem List   Diagnosis     Multiple myeloma (H)     Encounter for long-term current use of medication     Stem cells transplant status (H)     Kidney stones     Spinal stenosis     Hypertension     Community acquired pneumonia of right lower lobe of lung (H)     SIRS (systemic inflammatory response syndrome) (H)     Sepsis due to pneumonia (H)       Allergies (and reaction): Hydromet [hydrocodone w/homatropine] and Prochlorperazine maleate    Most recent flowsheet Weight: 97.6 kg (215 lb 3.2 oz)  Most recent flowsheet Height: 167.6 cm (5' 6\")      Intake/Output Summary (Last 24 hours) at 4/3/2019 0912  Last data filed at 4/3/2019 0701  Gross per 24 hour   Intake 2112.5 ml   Output 1250 ml   Net 862.5 ml       Tmax = Temp (24hrs), Av.1  F (37.3  C), Min:97.7  F (36.5  C), Max:101  F (38.3  C)      estimated creatinine clearance is 45.9 mL/min (A) (based on SCr of 1.45 mg/dL (H)).    Creatinine for last 3 days  3/31/2019:  7:14 PM Creatinine 2.04 mg/dL  2019:  4:12 AM Creatinine 1.82 mg/dL  2019:  4:08 AM Creatinine 1.53 mg/dL  4/3/2019:  7:45 AM Creatinine 1.45 mg/dL     Nephrotoxins and other renal medications (From now, onward)    Start     Dose/Rate Route Frequency Ordered Stop    19 0915  furosemide (LASIX) injection 20 mg      20 mg  over 1-2 Minutes Intravenous ONCE 19 0907      19 1600  vancomycin (VANCOCIN) 2,000 mg in sodium chloride 0.9 % 500 mL intermittent infusion      2,000 mg  over 2 Hours Intravenous EVERY 18 HOURS 19 1504      19 1400  piperacillin-tazobactam (ZOSYN) intermittent infusion 4.5 g      4.5 g  200 mL/hr over 30 Minutes Intravenous EVERY " 6 HOURS 04/02/19 1209      04/01/19 1315  acyclovir (ZOVIRAX) capsule 400 mg      400 mg Oral 2 TIMES DAILY 04/01/19 1235            Contrast Orders - past 72 hours (72h ago, onward)    None          Vancomycin IV Administrations (past 72 hours)                   vancomycin (VANCOCIN) 2,000 mg in sodium chloride 0.9 % 500 mL intermittent infusion (mg) 2,000 mg New Bag 04/02/19 1529    vancomycin (VANCOCIN) 2,000 mg in sodium chloride 0.9 % 500 mL intermittent infusion (mg) 2,000 mg New Bag 04/01/19 2048                Cultures Pending: sputum, blood    Culture Results: Influenza a and b negative; RSV negative    Assessment/Plan: Check daily SCr due to vancomycin plus piperacillin/tazobactam    Goal Trough Level: 15-20 mg/L       Thank You for the consult. Will continue to follow.    Lesley Huerta [facility] ....................  4/3/2019   9:12 AM

## 2019-04-04 LAB
ANION GAP SERPL CALCULATED.3IONS-SCNC: 8 MMOL/L (ref 3–14)
BASOPHILS # BLD AUTO: 0 10E9/L (ref 0–0.2)
BASOPHILS NFR BLD AUTO: 0.3 %
BUN SERPL-MCNC: 11 MG/DL (ref 7–25)
CALCIUM SERPL-MCNC: 8 MG/DL (ref 8.6–10.3)
CHLORIDE SERPL-SCNC: 107 MMOL/L (ref 98–107)
CO2 SERPL-SCNC: 28 MMOL/L (ref 21–31)
CREAT SERPL-MCNC: 1.46 MG/DL (ref 0.7–1.3)
DIFFERENTIAL METHOD BLD: ABNORMAL
EOSINOPHIL # BLD AUTO: 0.1 10E9/L (ref 0–0.7)
EOSINOPHIL NFR BLD AUTO: 2.1 %
ERYTHROCYTE [DISTWIDTH] IN BLOOD BY AUTOMATED COUNT: 15.7 % (ref 10–15)
GFR SERPL CREATININE-BSD FRML MDRD: 47 ML/MIN/{1.73_M2}
GLUCOSE SERPL-MCNC: 118 MG/DL (ref 70–105)
HCT VFR BLD AUTO: 31.4 % (ref 40–53)
HGB BLD-MCNC: 10.4 G/DL (ref 13.3–17.7)
IMM GRANULOCYTES # BLD: 0 10E9/L (ref 0–0.4)
IMM GRANULOCYTES NFR BLD: 1 %
LYMPHOCYTES # BLD AUTO: 0.5 10E9/L (ref 0.8–5.3)
LYMPHOCYTES NFR BLD AUTO: 18.2 %
MCH RBC QN AUTO: 34.7 PG (ref 26.5–33)
MCHC RBC AUTO-ENTMCNC: 33.1 G/DL (ref 31.5–36.5)
MCV RBC AUTO: 105 FL (ref 78–100)
MONOCYTES # BLD AUTO: 0.2 10E9/L (ref 0–1.3)
MONOCYTES NFR BLD AUTO: 6.2 %
NEUTROPHILS # BLD AUTO: 2.1 10E9/L (ref 1.6–8.3)
NEUTROPHILS NFR BLD AUTO: 72.2 %
PLATELET # BLD AUTO: 52 10E9/L (ref 150–450)
POTASSIUM SERPL-SCNC: 3.6 MMOL/L (ref 3.5–5.1)
RBC # BLD AUTO: 3 10E12/L (ref 4.4–5.9)
SODIUM SERPL-SCNC: 143 MMOL/L (ref 134–144)
WBC # BLD AUTO: 2.9 10E9/L (ref 4–11)

## 2019-04-04 PROCEDURE — 40000275 ZZH STATISTIC RCP TIME EA 10 MIN

## 2019-04-04 PROCEDURE — 94640 AIRWAY INHALATION TREATMENT: CPT

## 2019-04-04 PROCEDURE — 99232 SBSQ HOSP IP/OBS MODERATE 35: CPT | Performed by: FAMILY MEDICINE

## 2019-04-04 PROCEDURE — 94640 AIRWAY INHALATION TREATMENT: CPT | Mod: 76

## 2019-04-04 PROCEDURE — 25000132 ZZH RX MED GY IP 250 OP 250 PS 637: Mod: GY | Performed by: FAMILY MEDICINE

## 2019-04-04 PROCEDURE — A9270 NON-COVERED ITEM OR SERVICE: HCPCS | Mod: GY | Performed by: FAMILY MEDICINE

## 2019-04-04 PROCEDURE — 36415 COLL VENOUS BLD VENIPUNCTURE: CPT | Performed by: FAMILY MEDICINE

## 2019-04-04 PROCEDURE — 25000125 ZZHC RX 250: Performed by: FAMILY MEDICINE

## 2019-04-04 PROCEDURE — 25000128 H RX IP 250 OP 636: Performed by: FAMILY MEDICINE

## 2019-04-04 PROCEDURE — 25800030 ZZH RX IP 258 OP 636: Performed by: FAMILY MEDICINE

## 2019-04-04 PROCEDURE — 12000000 ZZH R&B MED SURG/OB

## 2019-04-04 PROCEDURE — 85025 COMPLETE CBC W/AUTO DIFF WBC: CPT | Performed by: FAMILY MEDICINE

## 2019-04-04 PROCEDURE — 80048 BASIC METABOLIC PNL TOTAL CA: CPT | Performed by: FAMILY MEDICINE

## 2019-04-04 RX ORDER — SACCHAROMYCES BOULARDII 250 MG
250 CAPSULE ORAL 2 TIMES DAILY
Status: DISCONTINUED | OUTPATIENT
Start: 2019-04-04 | End: 2019-04-07 | Stop reason: HOSPADM

## 2019-04-04 RX ORDER — FUROSEMIDE 10 MG/ML
20 INJECTION INTRAMUSCULAR; INTRAVENOUS
Status: DISCONTINUED | OUTPATIENT
Start: 2019-04-04 | End: 2019-04-06

## 2019-04-04 RX ADMIN — FUROSEMIDE 20 MG: 10 INJECTION, SOLUTION INTRAMUSCULAR; INTRAVENOUS at 07:47

## 2019-04-04 RX ADMIN — AMITRIPTYLINE HYDROCHLORIDE 10 MG: 10 TABLET, FILM COATED ORAL at 19:18

## 2019-04-04 RX ADMIN — RDII 250 MG CAPSULE 250 MG: at 20:54

## 2019-04-04 RX ADMIN — GABAPENTIN 600 MG: 600 TABLET, FILM COATED ORAL at 06:38

## 2019-04-04 RX ADMIN — GABAPENTIN 900 MG: 300 CAPSULE ORAL at 12:35

## 2019-04-04 RX ADMIN — GABAPENTIN 600 MG: 600 TABLET, FILM COATED ORAL at 19:17

## 2019-04-04 RX ADMIN — TAZOBACTAM SODIUM AND PIPERACILLIN SODIUM 4.5 G: 500; 4 INJECTION, SOLUTION INTRAVENOUS at 14:09

## 2019-04-04 RX ADMIN — TAZOBACTAM SODIUM AND PIPERACILLIN SODIUM 4.5 G: 500; 4 INJECTION, SOLUTION INTRAVENOUS at 02:18

## 2019-04-04 RX ADMIN — LOPERAMIDE HYDROCHLORIDE 2 MG: 2 CAPSULE ORAL at 06:38

## 2019-04-04 RX ADMIN — TAMSULOSIN HYDROCHLORIDE 0.4 MG: 0.4 CAPSULE ORAL at 19:18

## 2019-04-04 RX ADMIN — VANCOMYCIN HYDROCHLORIDE 2000 MG: 1 INJECTION, POWDER, LYOPHILIZED, FOR SOLUTION INTRAVENOUS at 04:17

## 2019-04-04 RX ADMIN — OXYCODONE HYDROCHLORIDE 20 MG: 20 TABLET, FILM COATED, EXTENDED RELEASE ORAL at 06:38

## 2019-04-04 RX ADMIN — ACYCLOVIR 400 MG: 200 CAPSULE ORAL at 06:37

## 2019-04-04 RX ADMIN — RDII 250 MG CAPSULE 250 MG: at 14:09

## 2019-04-04 RX ADMIN — IPRATROPIUM BROMIDE AND ALBUTEROL SULFATE 3 ML: .5; 3 SOLUTION RESPIRATORY (INHALATION) at 18:24

## 2019-04-04 RX ADMIN — FUROSEMIDE 20 MG: 10 INJECTION, SOLUTION INTRAMUSCULAR; INTRAVENOUS at 14:09

## 2019-04-04 RX ADMIN — AMOXICILLIN AND CLAVULANATE POTASSIUM 1 TABLET: 875; 125 TABLET, FILM COATED ORAL at 19:19

## 2019-04-04 RX ADMIN — LOPERAMIDE HYDROCHLORIDE 2 MG: 2 CAPSULE ORAL at 19:18

## 2019-04-04 RX ADMIN — ACYCLOVIR 400 MG: 200 CAPSULE ORAL at 19:17

## 2019-04-04 RX ADMIN — PANTOPRAZOLE SODIUM 40 MG: 40 TABLET, DELAYED RELEASE ORAL at 06:38

## 2019-04-04 RX ADMIN — IPRATROPIUM BROMIDE AND ALBUTEROL SULFATE 3 ML: .5; 3 SOLUTION RESPIRATORY (INHALATION) at 06:05

## 2019-04-04 RX ADMIN — OXYCODONE HYDROCHLORIDE 10 MG: 10 TABLET, FILM COATED, EXTENDED RELEASE ORAL at 19:17

## 2019-04-04 RX ADMIN — OXYCODONE HYDROCHLORIDE 10 MG: 10 TABLET, FILM COATED, EXTENDED RELEASE ORAL at 12:35

## 2019-04-04 RX ADMIN — IPRATROPIUM BROMIDE AND ALBUTEROL SULFATE 3 ML: .5; 3 SOLUTION RESPIRATORY (INHALATION) at 15:06

## 2019-04-04 RX ADMIN — IPRATROPIUM BROMIDE AND ALBUTEROL SULFATE 3 ML: .5; 3 SOLUTION RESPIRATORY (INHALATION) at 10:54

## 2019-04-04 RX ADMIN — TAZOBACTAM SODIUM AND PIPERACILLIN SODIUM 4.5 G: 500; 4 INJECTION, SOLUTION INTRAVENOUS at 07:47

## 2019-04-04 RX ADMIN — ASPIRIN 81 MG: 81 TABLET, COATED ORAL at 06:38

## 2019-04-04 ASSESSMENT — ACTIVITIES OF DAILY LIVING (ADL)
ADLS_ACUITY_SCORE: 13

## 2019-04-04 ASSESSMENT — MIFFLIN-ST. JEOR: SCORE: 1634.35

## 2019-04-04 NOTE — PROGRESS NOTES
[facility]    Medicine Progress Note - Hospitalist Service       Date of Admission:  3/31/2019  Assessment & Plan               Aden Rosa is a 79 year old male who presents with weakness.  Seen in [place] days prior to admission and diagnosed with right-sided pneumonia, started on Levaquin, with acute worsening on the day of admission.  Admitted and started on Zosyn, azithromycin and vancomycin.  Vancomycin has now been discontinued and will change antibiotics to oral.  Utilizing Lasix for diuresis.  Reports interval improvement in fatigue and shortness of breath.  Now requiring 2L of oxygen.  Renal function stable.      Principal Problem:  Sepsis    Assessment: Slow improvement in symptoms.     Plan: We will change to oral Augmentin.  Community acquired pneumonia of right lower lobe of lung (H)    Assessment: Immunosuppressed patient.  Last chemo 15 days ago.  Patient has been afebrile and has now had sustained improvement over the past 2 days with significant improvement in pro-calcitonin.    Plan: Discontinue vancomycin and azithromycin.  Change to oral Augmentin.  Active Problems:  Multiple myeloma (H)    Assessment: Follows with oncology in [place].  Currently ixazomib oral chemotherapy 3 weeks on and one-week off.  Last dose 3/20/2019.    Plan: Follow CBC.  Stem cells transplant status (H)    Assessment: As above; has been stable    Plan: Continue to monitor carefully  Hypertension    Assessment: Mild elevation    Plan: Follow vital signs    Pancytopenia  Assessment: Consequence of chemotherapy.  Hemoglobin stable.  White count imelda slowly since admission and now stabilized with ANC greater than 2.0.  Platelets have continued to trend down.  No bleeding.  Plan: Continue to monitor    Diet: Combination Diet Regular Diet Adult    DVT Prophylaxis: Pneumatic Compression Devices  Guevara Catheter: not present  Code Status: Full Code      Disposition Plan   Expected discharge: 1-2d,  recommended to prior living arrangement once O2 requirements resolved.  Entered: Satish Alvarado MD 04/04/2019, 1:16 PM       The patient's care was discussed with the Patient and Patient's Family.    Satish lAvarado MD  Hospitalist Service  [facility]    ______________________________________________________________________    Interval History   Patient reports that he feels better today but is not back at baseline.  He is still requiring supplemental oxygen.  Gets quite winded with any ambulation.  He tells me that he is bored and sick of being in the hospital.  No fevers overnight.  Eating and drinking well.  Reports he has been urinating quite frequently with the Lasix.    Denies any chest pain.  No new issues.    Data reviewed today: I reviewed all medications, new labs and imaging results over the last 24 hours. I personally reviewed no images or EKG's today.    Physical Exam   Vital Signs: Temp: 97  F (36.1  C) Temp src: Tympanic BP: 148/55   Heart Rate: 82 Resp: 17 SpO2: 95 % O2 Device: Nasal cannula with humidification Oxygen Delivery: 4 LPM(decreased to 3L)  Weight: 214 lbs 3.2 oz  Constitutional: awake, alert, cooperative, no apparent distress, and appears stated age  Respiratory: Very minimal right sided rhonchi presently.  Significant improvement from yesterday.  Left lung clear to auscultation.  No increased work of breathing.  I was able to move him from 3 L down to 2 L and he maintained saturations at 93% even with conversation.  On 1 L of oxygen his saturations dropped to 91% with talking and 92% at rest.  Cardiovascular: Regular rate and rhythm  Musculoskeletal: No synovitis.  Generalized weakness with no focal motor weakness.  Neuropsychiatric: General: normal, calm and normal eye contact  Orientation: oriented to self, place, time and situation  Memory and insight: normal, memory for past and recent events intact and thought process normal    Data   Recent Labs   Lab  04/04/19  0804 04/03/19  0745 04/02/19  0408  03/31/19  1914   WBC 2.9* 2.8* 3.0*   < > 2.0*   HGB 10.4* 10.3* 10.7*   < > 11.3*   * 104* 104*   < > 104*   PLT 52* 55* 65*   < > 83*    140 139   < > 134   POTASSIUM 3.6 3.3* 3.8   < > 3.8   CHLORIDE 107 106 107   < > 102   CO2 28 25 24   < > 22   BUN 11 15 19   < > 31*   CR 1.46* 1.45* 1.53*   < > 2.04*   ANIONGAP 8 9 8   < > 10   OLAYINKA 8.0* 7.8* 7.9*   < > 8.5*   * 114* 85   < > 113*   ALBUMIN  --  2.7*  --   --  3.4*   PROTTOTAL  --  5.7*  --   --  6.9   BILITOTAL  --  0.8  --   --  0.8   ALKPHOS  --  33*  --   --  44   ALT  --  37  --   --  50   AST  --  110*  --   --  151*    < > = values in this interval not displayed.     No results found for this or any previous visit (from the past 24 hour(s)).

## 2019-04-04 NOTE — PLAN OF CARE
Respiratory Compromise (Pneumonia)  Effective Oxygenation and Ventilation  4/3/2019 1934 by Gretta Sawyer, RN  Note  Pt A&O x4, VSS, afebrile. Pt remains on 4L O2 via NC. Pt satting 90%, denies dyspnea at rest. LS diminished with crackles in the bases. New IV placed for Vanco infusion, pt ambulated halls with assist of 1, pt had some shortness of breath but recovered after a few minutes. Pt reported mild back pain, scheduled pain medications given. 1 loose stool noted throughout shift. Call light within reach.    Gretta Sawyer, RN on 4/3/2019 at 7:34 PM

## 2019-04-04 NOTE — PLAN OF CARE
Patient gait is steady. He does become winded with activity, with oxygen on. Appetite has been good. He denies pain, he takes scheduled pain meds. This is his home medication plan.

## 2019-04-04 NOTE — PLAN OF CARE
Patient up to ambulate to bathroom this am, he then sat up in the chair for breakfast. Walked to bathroom and back to bed around 10 am. Gait is steady, he has slight dyspnea with activity. He is on oxygen. Appetite was good at breakfast.

## 2019-04-04 NOTE — PLAN OF CARE
Patient has been ambulating to the bathroom with sba. He tires out with activity. Oxygen level in mid 90's on 2 liters. Patient denies any pain, he reports I live on pain meds, for chronic pain. Vital signs stable. Taking po food and fluids well.

## 2019-04-04 NOTE — PROGRESS NOTES
Patient remains kaity stable. Afebrile. Continues to need 4L of Oxygen sating low 90's. Dyspneic with exertion. Lungs with crackles in right base. Faint expiratory wheeze in upper lobes. Non-productive cough present. Sputum culture still needed. Denies pain. Voiding with no difficulty. Standing weight obtained and is 97.2kg. Will continue to monitor.   Una Bangura RN on 4/4/2019 at 6:19 AM

## 2019-04-04 NOTE — PLAN OF CARE
Patient has had no loose stools since 11:25. She is taking food and fluids. She denies any pain. Vital signs stable.

## 2019-04-05 LAB
ANION GAP SERPL CALCULATED.3IONS-SCNC: 7 MMOL/L (ref 3–14)
BUN SERPL-MCNC: 9 MG/DL (ref 7–25)
CALCIUM SERPL-MCNC: 8.2 MG/DL (ref 8.6–10.3)
CHLORIDE SERPL-SCNC: 104 MMOL/L (ref 98–107)
CO2 SERPL-SCNC: 34 MMOL/L (ref 21–31)
CREAT SERPL-MCNC: 1.42 MG/DL (ref 0.7–1.3)
ERYTHROCYTE [DISTWIDTH] IN BLOOD BY AUTOMATED COUNT: 15.4 % (ref 10–15)
GFR SERPL CREATININE-BSD FRML MDRD: 48 ML/MIN/{1.73_M2}
GLUCOSE SERPL-MCNC: 99 MG/DL (ref 70–105)
HCT VFR BLD AUTO: 28.2 % (ref 40–53)
HGB BLD-MCNC: 9.3 G/DL (ref 13.3–17.7)
MCH RBC QN AUTO: 34.1 PG (ref 26.5–33)
MCHC RBC AUTO-ENTMCNC: 33 G/DL (ref 31.5–36.5)
MCV RBC AUTO: 103 FL (ref 78–100)
PLATELET # BLD AUTO: 52 10E9/L (ref 150–450)
POTASSIUM SERPL-SCNC: 3.1 MMOL/L (ref 3.5–5.1)
RBC # BLD AUTO: 2.73 10E12/L (ref 4.4–5.9)
SODIUM SERPL-SCNC: 145 MMOL/L (ref 134–144)
WBC # BLD AUTO: 2.9 10E9/L (ref 4–11)

## 2019-04-05 PROCEDURE — 40000275 ZZH STATISTIC RCP TIME EA 10 MIN

## 2019-04-05 PROCEDURE — 94640 AIRWAY INHALATION TREATMENT: CPT

## 2019-04-05 PROCEDURE — 99232 SBSQ HOSP IP/OBS MODERATE 35: CPT | Performed by: INTERNAL MEDICINE

## 2019-04-05 PROCEDURE — 25000132 ZZH RX MED GY IP 250 OP 250 PS 637: Mod: GY | Performed by: FAMILY MEDICINE

## 2019-04-05 PROCEDURE — 85027 COMPLETE CBC AUTOMATED: CPT | Performed by: FAMILY MEDICINE

## 2019-04-05 PROCEDURE — 80048 BASIC METABOLIC PNL TOTAL CA: CPT | Performed by: FAMILY MEDICINE

## 2019-04-05 PROCEDURE — 94640 AIRWAY INHALATION TREATMENT: CPT | Mod: 76

## 2019-04-05 PROCEDURE — A9270 NON-COVERED ITEM OR SERVICE: HCPCS | Mod: GY | Performed by: FAMILY MEDICINE

## 2019-04-05 PROCEDURE — 12000000 ZZH R&B MED SURG/OB

## 2019-04-05 PROCEDURE — 36415 COLL VENOUS BLD VENIPUNCTURE: CPT | Performed by: FAMILY MEDICINE

## 2019-04-05 PROCEDURE — 25000128 H RX IP 250 OP 636: Performed by: FAMILY MEDICINE

## 2019-04-05 PROCEDURE — 25000125 ZZHC RX 250: Performed by: FAMILY MEDICINE

## 2019-04-05 RX ADMIN — ACETAMINOPHEN 650 MG: 325 TABLET, FILM COATED ORAL at 20:00

## 2019-04-05 RX ADMIN — FUROSEMIDE 20 MG: 10 INJECTION, SOLUTION INTRAMUSCULAR; INTRAVENOUS at 08:27

## 2019-04-05 RX ADMIN — GABAPENTIN 900 MG: 300 CAPSULE ORAL at 12:01

## 2019-04-05 RX ADMIN — FUROSEMIDE 20 MG: 10 INJECTION, SOLUTION INTRAMUSCULAR; INTRAVENOUS at 14:48

## 2019-04-05 RX ADMIN — LOPERAMIDE HYDROCHLORIDE 2 MG: 2 CAPSULE ORAL at 06:49

## 2019-04-05 RX ADMIN — GABAPENTIN 600 MG: 600 TABLET, FILM COATED ORAL at 18:06

## 2019-04-05 RX ADMIN — IPRATROPIUM BROMIDE AND ALBUTEROL SULFATE 3 ML: .5; 3 SOLUTION RESPIRATORY (INHALATION) at 19:41

## 2019-04-05 RX ADMIN — AMOXICILLIN AND CLAVULANATE POTASSIUM 1 TABLET: 875; 125 TABLET, FILM COATED ORAL at 19:59

## 2019-04-05 RX ADMIN — OXYCODONE HYDROCHLORIDE 5 MG: 5 TABLET ORAL at 21:52

## 2019-04-05 RX ADMIN — ACYCLOVIR 400 MG: 200 CAPSULE ORAL at 06:49

## 2019-04-05 RX ADMIN — ASPIRIN 81 MG: 81 TABLET, COATED ORAL at 06:49

## 2019-04-05 RX ADMIN — RDII 250 MG CAPSULE 250 MG: at 09:34

## 2019-04-05 RX ADMIN — GABAPENTIN 600 MG: 600 TABLET, FILM COATED ORAL at 06:49

## 2019-04-05 RX ADMIN — ACYCLOVIR 400 MG: 200 CAPSULE ORAL at 18:09

## 2019-04-05 RX ADMIN — IPRATROPIUM BROMIDE AND ALBUTEROL SULFATE 3 ML: .5; 3 SOLUTION RESPIRATORY (INHALATION) at 07:44

## 2019-04-05 RX ADMIN — AMOXICILLIN AND CLAVULANATE POTASSIUM 1 TABLET: 875; 125 TABLET, FILM COATED ORAL at 08:27

## 2019-04-05 RX ADMIN — IPRATROPIUM BROMIDE AND ALBUTEROL SULFATE 3 ML: .5; 3 SOLUTION RESPIRATORY (INHALATION) at 11:09

## 2019-04-05 RX ADMIN — LOPERAMIDE HYDROCHLORIDE 2 MG: 2 CAPSULE ORAL at 18:06

## 2019-04-05 RX ADMIN — OXYCODONE HYDROCHLORIDE 20 MG: 20 TABLET, FILM COATED, EXTENDED RELEASE ORAL at 06:49

## 2019-04-05 RX ADMIN — TAMSULOSIN HYDROCHLORIDE 0.4 MG: 0.4 CAPSULE ORAL at 19:59

## 2019-04-05 RX ADMIN — AMITRIPTYLINE HYDROCHLORIDE 10 MG: 10 TABLET, FILM COATED ORAL at 18:06

## 2019-04-05 RX ADMIN — OXYCODONE HYDROCHLORIDE 10 MG: 10 TABLET, FILM COATED, EXTENDED RELEASE ORAL at 18:07

## 2019-04-05 RX ADMIN — PANTOPRAZOLE SODIUM 40 MG: 40 TABLET, DELAYED RELEASE ORAL at 06:49

## 2019-04-05 RX ADMIN — RDII 250 MG CAPSULE 250 MG: at 21:49

## 2019-04-05 RX ADMIN — OXYCODONE HYDROCHLORIDE 10 MG: 10 TABLET, FILM COATED, EXTENDED RELEASE ORAL at 12:01

## 2019-04-05 RX ADMIN — IPRATROPIUM BROMIDE AND ALBUTEROL SULFATE 3 ML: .5; 3 SOLUTION RESPIRATORY (INHALATION) at 14:58

## 2019-04-05 ASSESSMENT — ACTIVITIES OF DAILY LIVING (ADL)
ADLS_ACUITY_SCORE: 13

## 2019-04-05 ASSESSMENT — MIFFLIN-ST. JEOR: SCORE: 1611.67

## 2019-04-05 NOTE — PROGRESS NOTES
Vitally stable. Afebrile.  Remains on 2L Oxygen and Spo2 low 90's. Slightly dyspneic with exertion but verbalizes improvement. Lungs with crackles to bases. Nonproductive, congested cough present. Will continue to monitor and implement interventions as needed.   Una Bangura RN on 4/5/2019 at 6:15 AM

## 2019-04-05 NOTE — PROGRESS NOTES
[facility]    Hospitalist Progress Note      Assessment & Plan   Aden Rosa is a 79 year old male who was admitted on 3/31/2019.     > Community acquired pneumonia, acute, poa  > Sepsis, resolving   Appears to be improving.   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 Has not been able to wean off O2 without desats.  Trying to do IS regularly.     -Data reviewed today: I reviewed all new labs and imaging results over the last 24 hours. I personally reviewed no images or EKG's today.    Physical Exam   Temp: 96.4  F (35.8  C) Temp src: Tympanic BP: 168/66 Pulse: 85 Heart Rate: 87 Resp: 18 SpO2: 93 % O2 Device: Nasal cannula with humidification Oxygen Delivery: 2 LPM  Vitals:    04/03/19 0736 04/04/19 0305 04/05/19 0500   Weight: 97.6 kg (215 lb 3.2 oz) 97.2 kg (214 lb 3.2 oz) 94.9 kg (209 lb 3.2 oz)     Vital Signs with Ranges  Temp:  [96.2  F (35.7  C)-96.9  F (36.1  C)] 96.4  F (35.8  C)  Pulse:  [85] 85  Heart Rate:  [77-87] 87  Resp:  [16-20] 18  BP: (147-169)/(55-66) 168/66  SpO2:  [89 %-93 %] 93 %  I/O last 3 completed shifts:  In: 2232 [P.O.:1620; I.V.:612]  Out: 3075 [Urine:3075]    Gen: Alert, NAD.  HEENT: MMM  Neck: Supple  CV: RRR no m/r/g  Pulm: CTAB, no w/r/r. No increased work of breathing  Msk: trace bilat LE edema  Abd: Soft  Neuro: Grossly intact  Skin: No concerning lesions.  Psychiatric: Normal affect and insight. Does not appear anxious or depressed.        Medications     sodium chloride 10 mL/hr at 04/05/19 4176        acyclovir  400 mg Oral BID     amitriptyline  10 mg Oral QPM     amoxicillin-clavulanate  1 tablet Oral Q12H CRISSY     aspirin  81 mg Oral Daily     furosemide  20 mg Intravenous BID     gabapentin  900 mg Oral Daily with lunch     gabapentin  600 mg Oral BID     ipratropium - albuterol 0.5 mg/2.5 mg/3 mL  3 mL Nebulization 4x daily     loperamide  2 mg Oral BID     oxyCODONE  10 mg Oral BID     oxyCODONE  20 mg Oral QAM     pantoprazole  40 mg Oral QAM     saccharomyces boulardii  250 mg Oral BID     tamsulosin  0.4 mg Oral QPM       Data   Recent Labs   Lab 04/05/19  0412 04/04/19  0804 04/03/19  0745  03/31/19  1914   WBC 2.9* 2.9* 2.8*   < > 2.0*   HGB 9.3* 10.4* 10.3*   < > 11.3*   * 105* 104*   < > 104*   PLT 52* 52* 55*   < > 83*   * 143 140   < > 134   POTASSIUM 3.1* 3.6 3.3*   < > 3.8   CHLORIDE 104 107 106   < > 102   CO2 34* 28 25   < > 22   BUN 9 11 15   < > 31*   CR 1.42* 1.46* 1.45*   < > 2.04*   ANIONGAP 7 8 9   < > 10   OLAYINKA 8.2* 8.0* 7.8*   < > 8.5*   GLC 99 118* 114*   < > 113*   ALBUMIN  --   --  2.7*  --  3.4*   PROTTOTAL  --   --  5.7*  --  6.9   BILITOTAL  --   --  0.8  --  0.8   ALKPHOS  --   --  33*  --  44   ALT  --   --  37  --  50   AST  --   --  110*  --  151*    < > = values in this interval not displayed.       No results found for this or any previous visit (from the past 24 hour(s)).

## 2019-04-05 NOTE — PLAN OF CARE
"Patient here for SIRS and pneumonia. Lives at home with his wife. Lung sounds are diminished. Afebrile. Patient is being diuresed and has adequate output. Noted to have SOB with exertion. Able to ambulate to bathroom without difficulty. SBA in room. Up in chair for meals. Patient is on 2 L NC with humidification at 92-94%. Not on oxygen chronically. Heart rate irregular. +2 edema in bilateral lower extremities. Bowel sounds active. Vitals stable, /66 (BP Location: Right arm)   Pulse 73   Temp 96.4  F (35.8  C) (Tympanic)   Resp 18   Ht 1.676 m (5' 6\")   Wt 94.9 kg (209 lb 3.2 oz)   SpO2 93%   BMI 33.77 kg/m      Yimi Jeronimo RN 04/05/19 1:38 PM        "

## 2019-04-06 LAB
ANION GAP SERPL CALCULATED.3IONS-SCNC: 9 MMOL/L (ref 3–14)
BUN SERPL-MCNC: 10 MG/DL (ref 7–25)
CALCIUM SERPL-MCNC: 8.7 MG/DL (ref 8.6–10.3)
CHLORIDE SERPL-SCNC: 100 MMOL/L (ref 98–107)
CO2 SERPL-SCNC: 34 MMOL/L (ref 21–31)
CREAT SERPL-MCNC: 1.33 MG/DL (ref 0.7–1.3)
ERYTHROCYTE [DISTWIDTH] IN BLOOD BY AUTOMATED COUNT: 15 % (ref 10–15)
GFR SERPL CREATININE-BSD FRML MDRD: 52 ML/MIN/{1.73_M2}
GLUCOSE SERPL-MCNC: 98 MG/DL (ref 70–105)
HCT VFR BLD AUTO: 28.5 % (ref 40–53)
HGB BLD-MCNC: 9.7 G/DL (ref 13.3–17.7)
MCH RBC QN AUTO: 34.5 PG (ref 26.5–33)
MCHC RBC AUTO-ENTMCNC: 34 G/DL (ref 31.5–36.5)
MCV RBC AUTO: 101 FL (ref 78–100)
NT-PROBNP SERPL-MCNC: 73 PG/ML (ref 0–100)
PLATELET # BLD AUTO: 60 10E9/L (ref 150–450)
POTASSIUM SERPL-SCNC: 2.6 MMOL/L (ref 3.5–5.1)
RBC # BLD AUTO: 2.81 10E12/L (ref 4.4–5.9)
SODIUM SERPL-SCNC: 143 MMOL/L (ref 134–144)
WBC # BLD AUTO: 3.2 10E9/L (ref 4–11)

## 2019-04-06 PROCEDURE — 94640 AIRWAY INHALATION TREATMENT: CPT | Mod: 76

## 2019-04-06 PROCEDURE — 25000132 ZZH RX MED GY IP 250 OP 250 PS 637: Mod: GY | Performed by: INTERNAL MEDICINE

## 2019-04-06 PROCEDURE — 85027 COMPLETE CBC AUTOMATED: CPT | Performed by: INTERNAL MEDICINE

## 2019-04-06 PROCEDURE — A9270 NON-COVERED ITEM OR SERVICE: HCPCS | Mod: GY | Performed by: FAMILY MEDICINE

## 2019-04-06 PROCEDURE — A9270 NON-COVERED ITEM OR SERVICE: HCPCS | Mod: GY | Performed by: INTERNAL MEDICINE

## 2019-04-06 PROCEDURE — 25000128 H RX IP 250 OP 636: Performed by: FAMILY MEDICINE

## 2019-04-06 PROCEDURE — 83880 ASSAY OF NATRIURETIC PEPTIDE: CPT | Performed by: INTERNAL MEDICINE

## 2019-04-06 PROCEDURE — 99232 SBSQ HOSP IP/OBS MODERATE 35: CPT | Performed by: INTERNAL MEDICINE

## 2019-04-06 PROCEDURE — 80048 BASIC METABOLIC PNL TOTAL CA: CPT | Performed by: INTERNAL MEDICINE

## 2019-04-06 PROCEDURE — 25000125 ZZHC RX 250: Performed by: FAMILY MEDICINE

## 2019-04-06 PROCEDURE — 25000132 ZZH RX MED GY IP 250 OP 250 PS 637: Mod: GY | Performed by: FAMILY MEDICINE

## 2019-04-06 PROCEDURE — 12000000 ZZH R&B MED SURG/OB

## 2019-04-06 PROCEDURE — 36415 COLL VENOUS BLD VENIPUNCTURE: CPT | Performed by: INTERNAL MEDICINE

## 2019-04-06 PROCEDURE — 94640 AIRWAY INHALATION TREATMENT: CPT

## 2019-04-06 PROCEDURE — 40000275 ZZH STATISTIC RCP TIME EA 10 MIN

## 2019-04-06 RX ORDER — FUROSEMIDE 20 MG
20 TABLET ORAL DAILY
Status: DISCONTINUED | OUTPATIENT
Start: 2019-04-06 | End: 2019-04-07 | Stop reason: HOSPADM

## 2019-04-06 RX ORDER — POTASSIUM CHLORIDE 1500 MG/1
20 TABLET, EXTENDED RELEASE ORAL DAILY
Status: DISCONTINUED | OUTPATIENT
Start: 2019-04-06 | End: 2019-04-07 | Stop reason: HOSPADM

## 2019-04-06 RX ADMIN — OXYCODONE HYDROCHLORIDE 20 MG: 20 TABLET, FILM COATED, EXTENDED RELEASE ORAL at 07:53

## 2019-04-06 RX ADMIN — OXYCODONE HYDROCHLORIDE 10 MG: 10 TABLET, FILM COATED, EXTENDED RELEASE ORAL at 11:38

## 2019-04-06 RX ADMIN — IPRATROPIUM BROMIDE AND ALBUTEROL SULFATE 3 ML: .5; 3 SOLUTION RESPIRATORY (INHALATION) at 18:13

## 2019-04-06 RX ADMIN — AMITRIPTYLINE HYDROCHLORIDE 10 MG: 10 TABLET, FILM COATED ORAL at 18:23

## 2019-04-06 RX ADMIN — GABAPENTIN 600 MG: 600 TABLET, FILM COATED ORAL at 07:54

## 2019-04-06 RX ADMIN — ACETAMINOPHEN 650 MG: 325 TABLET, FILM COATED ORAL at 20:54

## 2019-04-06 RX ADMIN — ACYCLOVIR 400 MG: 200 CAPSULE ORAL at 07:53

## 2019-04-06 RX ADMIN — ASPIRIN 81 MG: 81 TABLET, COATED ORAL at 07:54

## 2019-04-06 RX ADMIN — RDII 250 MG CAPSULE 250 MG: at 09:46

## 2019-04-06 RX ADMIN — IPRATROPIUM BROMIDE AND ALBUTEROL SULFATE 3 ML: .5; 3 SOLUTION RESPIRATORY (INHALATION) at 15:00

## 2019-04-06 RX ADMIN — IPRATROPIUM BROMIDE AND ALBUTEROL SULFATE 3 ML: .5; 3 SOLUTION RESPIRATORY (INHALATION) at 06:30

## 2019-04-06 RX ADMIN — PANTOPRAZOLE SODIUM 40 MG: 40 TABLET, DELAYED RELEASE ORAL at 07:54

## 2019-04-06 RX ADMIN — FUROSEMIDE 20 MG: 10 INJECTION, SOLUTION INTRAMUSCULAR; INTRAVENOUS at 14:29

## 2019-04-06 RX ADMIN — AMOXICILLIN AND CLAVULANATE POTASSIUM 1 TABLET: 875; 125 TABLET, FILM COATED ORAL at 18:22

## 2019-04-06 RX ADMIN — OXYCODONE HYDROCHLORIDE 10 MG: 10 TABLET, FILM COATED, EXTENDED RELEASE ORAL at 18:22

## 2019-04-06 RX ADMIN — LOPERAMIDE HYDROCHLORIDE 2 MG: 2 CAPSULE ORAL at 18:23

## 2019-04-06 RX ADMIN — GABAPENTIN 600 MG: 600 TABLET, FILM COATED ORAL at 18:22

## 2019-04-06 RX ADMIN — TAMSULOSIN HYDROCHLORIDE 0.4 MG: 0.4 CAPSULE ORAL at 18:23

## 2019-04-06 RX ADMIN — GABAPENTIN 900 MG: 300 CAPSULE ORAL at 11:37

## 2019-04-06 RX ADMIN — ACYCLOVIR 400 MG: 200 CAPSULE ORAL at 18:22

## 2019-04-06 RX ADMIN — AMOXICILLIN AND CLAVULANATE POTASSIUM 1 TABLET: 875; 125 TABLET, FILM COATED ORAL at 07:53

## 2019-04-06 RX ADMIN — LOPERAMIDE HYDROCHLORIDE 2 MG: 2 CAPSULE ORAL at 07:53

## 2019-04-06 RX ADMIN — FUROSEMIDE 20 MG: 10 INJECTION, SOLUTION INTRAMUSCULAR; INTRAVENOUS at 07:53

## 2019-04-06 RX ADMIN — IPRATROPIUM BROMIDE AND ALBUTEROL SULFATE 3 ML: .5; 3 SOLUTION RESPIRATORY (INHALATION) at 10:21

## 2019-04-06 RX ADMIN — RDII 250 MG CAPSULE 250 MG: at 18:33

## 2019-04-06 RX ADMIN — POTASSIUM CHLORIDE 20 MEQ: 1500 TABLET, EXTENDED RELEASE ORAL at 15:21

## 2019-04-06 ASSESSMENT — ACTIVITIES OF DAILY LIVING (ADL)
ADLS_ACUITY_SCORE: 13

## 2019-04-06 ASSESSMENT — MIFFLIN-ST. JEOR: SCORE: 1592.62

## 2019-04-06 NOTE — PROGRESS NOTES
[facility]  Hospitalist Progress Note    Assessment & Plan   Aden Rosa is a 79 year old male who was admitted on 3/31/2019.     > Community acquired pneumonia, acute, poa  > Sepsis, resolved  > Hypoxia   Appears to be improving. BNP normal, appropriately diuresed.   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 Has not been able to wean off O2 without desats.  Trying to do IS regularly.     -Data reviewed today: I reviewed all new labs and imaging results over the last 24 hours. I personally reviewed no images or EKG's today.    Physical Exam   Temp: 96.3  F (35.7  C) Temp src: Tympanic BP: 157/66 Pulse: 91 Heart Rate: 95 Resp: 16 SpO2: 91 % O2 Device: Nasal cannula Oxygen Delivery: 2 LPM  Vitals:    04/04/19 0305 04/05/19 0500 04/06/19 0400   Weight: 97.2 kg (214 lb 3.2 oz) 94.9 kg (209 lb 3.2 oz) 93 kg (205 lb)     Vital Signs with Ranges  Temp:  [96.3  F (35.7  C)-98.3  F (36.8  C)] 96.3  F (35.7  C)  Pulse:  [85-91] 91  Heart Rate:  [73-95] 95  Resp:  [16-20] 16  BP: (139-157)/(57-66) 157/66  SpO2:  [89 %-97 %] 91 %  I/O last 3 completed shifts:  In: 657.7 [P.O.:440; I.V.:217.7]  Out: 3990 [Urine:3990]    Gen: Alert, NAD.  HEENT: MMM  Neck: Supple  CV: RRR no m/r/g  Pulm: Rales at the bases  Msk: 1+ bilat LE edema  Abd: Soft  Neuro: Grossly intact  Skin: No concerning lesions.  Psychiatric: Normal affect and insight. Does not appear anxious or depressed.        Medications      sodium chloride 10 mL/hr at 04/05/19 0828       acyclovir  400 mg Oral BID     amitriptyline  10 mg Oral QPM     amoxicillin-clavulanate  1 tablet Oral Q12H CRISSY     aspirin  81 mg Oral Daily     [START ON 4/7/2019] furosemide  20 mg Oral Daily     gabapentin  900 mg Oral Daily with lunch     gabapentin  600 mg Oral BID     ipratropium - albuterol 0.5 mg/2.5 mg/3 mL  3 mL Nebulization 4x daily     loperamide  2 mg Oral BID     oxyCODONE  10 mg Oral BID     oxyCODONE  20 mg Oral QAM     pantoprazole  40 mg Oral QAM     [START ON 4/7/2019] potassium chloride  20 mEq Oral Daily     saccharomyces boulardii  250 mg Oral BID     tamsulosin  0.4 mg Oral QPM       Data   Recent Labs   Lab 04/06/19  0515 04/05/19  0412 04/04/19  0804 04/03/19  0745  03/31/19  1914   WBC 3.2* 2.9* 2.9* 2.8*   < > 2.0*   HGB 9.7* 9.3* 10.4* 10.3*   < > 11.3*   * 103* 105* 104*   < > 104*   PLT 60* 52* 52* 55*   < > 83*    145* 143 140   < > 134   POTASSIUM 2.6* 3.1* 3.6 3.3*   < > 3.8   CHLORIDE 100 104 107 106   < > 102   CO2 34* 34* 28 25   < > 22   BUN 10 9 11 15   < > 31*   CR 1.33* 1.42* 1.46* 1.45*   < > 2.04*   ANIONGAP 9 7 8 9   < > 10   OLAYINKA 8.7 8.2* 8.0* 7.8*   < > 8.5*   GLC 98 99 118* 114*   < > 113*   ALBUMIN  --   --   --  2.7*  --  3.4*   PROTTOTAL  --   --   --  5.7*  --  6.9   BILITOTAL  --   --   --  0.8  --  0.8   ALKPHOS  --   --   --  33*  --  44   ALT  --   --   --  37  --  50   AST  --   --   --  110*  --  151*    < > = values in this interval not displayed.       No results found for this or any previous visit (from the past 24 hour(s)).

## 2019-04-06 NOTE — PROGRESS NOTES
RT walked hallway with patient for about 4 minutes. Pt started walk on 2L O2, SpO2 dropped to 86%, titrated up to 4L to keep SpO2 above 90%.     Debbie Jensen, RT

## 2019-04-06 NOTE — PLAN OF CARE
Pt had an uneventful night. Slept without complaints. Potassium down to 2.6. Left sticky note to inform. Tylenol and oxycodone before bed for leg cramping. Relieved with pain meds, massage and warm blanket. Sats 89% on RA, instructed to deep breathe. Sats up to 92% with encouragement. LS coarse throughout with occ. Congested cough, chronic. 2+ edema to legs, declined to elevate on pillows.

## 2019-04-06 NOTE — PLAN OF CARE
"Patient here for concerns of hypoxia. Noted to still be on 2L NC and oxygen sats are 90-92%. Patient tolerated going for a few walks today. RT brought patient on a walk and patients oxygen dropped to 86% while on 2 L and therefore was bumped up to 4L and was at 90%. Lung sounds with fine crackles in the bases and inspiratory wheezes. Has performed IS machine without difficulty. Denies any pain this shift.  Bowel sounds are active. Vitals stable, /60 (BP Location: Right arm)   Pulse 91   Temp 98.1  F (36.7  C) (Tympanic)   Resp 18   Ht 1.676 m (5' 6\")   Wt 93 kg (205 lb)   SpO2 90%   BMI 33.09 kg/m      Yimi Jeronimo RN 04/06/19 5:51 PM        "

## 2019-04-07 ENCOUNTER — APPOINTMENT (OUTPATIENT)
Dept: GENERAL RADIOLOGY | Facility: OTHER | Age: 79
DRG: 871 | End: 2019-04-07
Attending: INTERNAL MEDICINE
Payer: MEDICARE

## 2019-04-07 VITALS
HEART RATE: 77 BPM | OXYGEN SATURATION: 92 % | DIASTOLIC BLOOD PRESSURE: 49 MMHG | WEIGHT: 200.8 LBS | BODY MASS INDEX: 32.27 KG/M2 | RESPIRATION RATE: 16 BRPM | HEIGHT: 66 IN | TEMPERATURE: 97.3 F | SYSTOLIC BLOOD PRESSURE: 112 MMHG

## 2019-04-07 DIAGNOSIS — I10 ESSENTIAL HYPERTENSION: ICD-10-CM

## 2019-04-07 LAB
ANION GAP SERPL CALCULATED.3IONS-SCNC: 6 MMOL/L (ref 3–14)
BUN SERPL-MCNC: 9 MG/DL (ref 7–25)
CALCIUM SERPL-MCNC: 8.4 MG/DL (ref 8.6–10.3)
CHLORIDE SERPL-SCNC: 95 MMOL/L (ref 98–107)
CO2 SERPL-SCNC: 38 MMOL/L (ref 21–31)
CREAT SERPL-MCNC: 1.36 MG/DL (ref 0.7–1.3)
ERYTHROCYTE [DISTWIDTH] IN BLOOD BY AUTOMATED COUNT: 15 % (ref 10–15)
GFR SERPL CREATININE-BSD FRML MDRD: 51 ML/MIN/{1.73_M2}
GLUCOSE SERPL-MCNC: 100 MG/DL (ref 70–105)
HCT VFR BLD AUTO: 28.8 % (ref 40–53)
HGB BLD-MCNC: 9.8 G/DL (ref 13.3–17.7)
MCH RBC QN AUTO: 34.9 PG (ref 26.5–33)
MCHC RBC AUTO-ENTMCNC: 34 G/DL (ref 31.5–36.5)
MCV RBC AUTO: 103 FL (ref 78–100)
PLATELET # BLD AUTO: 73 10E9/L (ref 150–450)
POTASSIUM SERPL-SCNC: 2.8 MMOL/L (ref 3.5–5.1)
RBC # BLD AUTO: 2.81 10E12/L (ref 4.4–5.9)
SODIUM SERPL-SCNC: 139 MMOL/L (ref 134–144)
WBC # BLD AUTO: 4.5 10E9/L (ref 4–11)

## 2019-04-07 PROCEDURE — 25000132 ZZH RX MED GY IP 250 OP 250 PS 637: Performed by: FAMILY MEDICINE

## 2019-04-07 PROCEDURE — 25000132 ZZH RX MED GY IP 250 OP 250 PS 637: Performed by: INTERNAL MEDICINE

## 2019-04-07 PROCEDURE — 94640 AIRWAY INHALATION TREATMENT: CPT | Mod: 76

## 2019-04-07 PROCEDURE — 71045 X-RAY EXAM CHEST 1 VIEW: CPT

## 2019-04-07 PROCEDURE — 80048 BASIC METABOLIC PNL TOTAL CA: CPT | Performed by: INTERNAL MEDICINE

## 2019-04-07 PROCEDURE — 25000125 ZZHC RX 250: Performed by: FAMILY MEDICINE

## 2019-04-07 PROCEDURE — 99239 HOSP IP/OBS DSCHRG MGMT >30: CPT | Performed by: INTERNAL MEDICINE

## 2019-04-07 PROCEDURE — A9270 NON-COVERED ITEM OR SERVICE: HCPCS | Performed by: INTERNAL MEDICINE

## 2019-04-07 PROCEDURE — 94640 AIRWAY INHALATION TREATMENT: CPT

## 2019-04-07 PROCEDURE — A9270 NON-COVERED ITEM OR SERVICE: HCPCS | Performed by: FAMILY MEDICINE

## 2019-04-07 PROCEDURE — 36415 COLL VENOUS BLD VENIPUNCTURE: CPT | Performed by: INTERNAL MEDICINE

## 2019-04-07 PROCEDURE — 40000275 ZZH STATISTIC RCP TIME EA 10 MIN

## 2019-04-07 PROCEDURE — 94618 PULMONARY STRESS TESTING: CPT

## 2019-04-07 PROCEDURE — 85027 COMPLETE CBC AUTOMATED: CPT | Performed by: INTERNAL MEDICINE

## 2019-04-07 RX ORDER — POTASSIUM CHLORIDE 7.45 MG/ML
10 INJECTION INTRAVENOUS
Status: DISCONTINUED | OUTPATIENT
Start: 2019-04-07 | End: 2019-04-07 | Stop reason: HOSPADM

## 2019-04-07 RX ORDER — POTASSIUM CHLORIDE 1500 MG/1
20 TABLET, EXTENDED RELEASE ORAL DAILY
Qty: 30 TABLET | Refills: 0 | Status: SHIPPED | OUTPATIENT
Start: 2019-04-08 | End: 2019-10-29

## 2019-04-07 RX ORDER — POTASSIUM CHLORIDE 1500 MG/1
20-40 TABLET, EXTENDED RELEASE ORAL
Status: DISCONTINUED | OUTPATIENT
Start: 2019-04-07 | End: 2019-04-07 | Stop reason: HOSPADM

## 2019-04-07 RX ORDER — FUROSEMIDE 20 MG
20 TABLET ORAL DAILY
Qty: 30 TABLET | Refills: 0 | Status: ON HOLD | OUTPATIENT
Start: 2019-04-08 | End: 2019-08-11

## 2019-04-07 RX ADMIN — OXYCODONE HYDROCHLORIDE 10 MG: 10 TABLET, FILM COATED, EXTENDED RELEASE ORAL at 11:10

## 2019-04-07 RX ADMIN — GABAPENTIN 900 MG: 300 CAPSULE ORAL at 11:10

## 2019-04-07 RX ADMIN — LOPERAMIDE HYDROCHLORIDE 2 MG: 2 CAPSULE ORAL at 06:17

## 2019-04-07 RX ADMIN — RDII 250 MG CAPSULE 250 MG: at 06:17

## 2019-04-07 RX ADMIN — IPRATROPIUM BROMIDE AND ALBUTEROL SULFATE 3 ML: .5; 3 SOLUTION RESPIRATORY (INHALATION) at 07:25

## 2019-04-07 RX ADMIN — IPRATROPIUM BROMIDE AND ALBUTEROL SULFATE 3 ML: .5; 3 SOLUTION RESPIRATORY (INHALATION) at 11:01

## 2019-04-07 RX ADMIN — FUROSEMIDE 20 MG: 20 TABLET ORAL at 06:17

## 2019-04-07 RX ADMIN — OXYCODONE HYDROCHLORIDE 20 MG: 20 TABLET, FILM COATED, EXTENDED RELEASE ORAL at 06:16

## 2019-04-07 RX ADMIN — AMOXICILLIN AND CLAVULANATE POTASSIUM 1 TABLET: 875; 125 TABLET, FILM COATED ORAL at 06:17

## 2019-04-07 RX ADMIN — PANTOPRAZOLE SODIUM 40 MG: 40 TABLET, DELAYED RELEASE ORAL at 06:17

## 2019-04-07 RX ADMIN — ASPIRIN 81 MG: 81 TABLET, COATED ORAL at 06:17

## 2019-04-07 RX ADMIN — GABAPENTIN 600 MG: 600 TABLET, FILM COATED ORAL at 06:17

## 2019-04-07 RX ADMIN — ACYCLOVIR 400 MG: 200 CAPSULE ORAL at 06:16

## 2019-04-07 RX ADMIN — POTASSIUM CHLORIDE 20 MEQ: 1500 TABLET, EXTENDED RELEASE ORAL at 06:17

## 2019-04-07 ASSESSMENT — ACTIVITIES OF DAILY LIVING (ADL)
ADLS_ACUITY_SCORE: 13

## 2019-04-07 ASSESSMENT — MIFFLIN-ST. JEOR: SCORE: 1573.57

## 2019-04-07 NOTE — DISCHARGE SUMMARY
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 Valencia's H&P:  Aden Rosa is a 79 year old male who presents with weakness and cough.  He is being followed for multiple myeloma and uses oral chemotherapy, Ninlaro, 3 weeks on and 3 weeks off with most recent dose 3/20/2019.  He started to develop a sore throat and cold symptoms for 5 days prior to admission and was seen by his primary physician, Dr. Olivares, at [facility] in [place].  He was given a shot of Rocephin and started on Levaquin, which he has been taking.  Initially felt better, but then today was weak and shaky and felt short of breath.  He was unable to get in the car and was brought to the emergency department via EMS.  He had an initial oxygen saturation of 85% in the ER, temp 102.7, heart rate 90s, normal blood pressure, no confusion.          Hospital Course   He had a gradual and slow progression with improvement on IV antibiotics.  Immunosuppressive medications were held.  Supplemental oxygen was required and weaned as able.  However prior to discharge unable to wean supplemental oxygen.  6-minute walk prior to discharge revealed resting oxygen requirement of 1 L/min, up to 4 L/min with exercise.  Given the rales at the bases and previous CT report findings of linear scarring I believe there is an underlying pulmonary fibrosis pattern making it  more difficult for him to wean.  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  No known exposure to asbestos.  I expect the oxygen will be able to be weaned over the next weeks and months.  Close follow-up with his primary care physician Dr. Olivares is recommended.    Won Michael MD    Significant Results and Procedures   See below    Pending Results   These results will be followed up by Ramiro Olivares   Unresulted Labs Ordered in the Past 30 Days of this Admission     Date and Time Order Name Status Description    3/31/2019 1852 Blood culture Preliminary     3/31/2019 1852 Blood culture Preliminary           Code Status   Full Code       Primary Care Physician   Ramiro Olivares    Physical Exam   Temp: 97.3  F (36.3  C) Temp src: Tympanic BP: 112/49 Pulse: 77 Heart Rate: 92 Resp: 16 SpO2: 92 % O2 Device: Nasal cannula Oxygen Delivery: 2 LPM  Vitals:    04/05/19 0500 04/06/19 0400 04/07/19 0335   Weight: 94.9 kg (209 lb 3.2 oz) 93 kg (205 lb) 91.1 kg (200 lb 12.8 oz)     Vital Signs with Ranges  Temp:  [97.3  F (36.3  C)-99.2  F (37.3  C)] 97.3  F (36.3  C)  Pulse:  [77-95] 77  Heart Rate:  [] 92  Resp:  [14-20] 16  BP: (112-146)/(48-61) 112/49  SpO2:  [84 %-94 %] 92 %  I/O last 3 completed shifts:  In: 838 [P.O.:600; I.V.:238]  Out: 3165 [Urine:3165]    Gen: Alert, NAD.  HEENT: MMM, wearing O2 NC  Neck: Supple  CV: Regular  Pulm: Rales at the bases.  Decreased right base. No wheezing. No increased work of breathing  Msk: trace bilat LE edema  Abd: Soft  Neuro: Grossly intact  Skin: No concerning lesions.  Psychiatric: Normal affect and insight. Does not appear anxious or depressed.        Discharge Disposition   Discharged to home  Condition at discharge: Stable    Consultations This Hospital Stay   PHARMACY TO DOSE VANCO  PHARMACY TO DOSE VANCO  SOCIAL WORK IP CONSULT    Time Spent on this Encounter   I, Won Michael, personally saw the patient today and spent greater  than 30 minutes discharging this patient.    Discharge Orders      Reason for your hospital stay    ARDS and pneumonia     Follow-up and recommended labs and tests     1. Ramiro Olivares in 1 week     Activity    Your activity upon discharge: activity as tolerated     Full Code     Oxygen Adult    [facility] Negron Torres Oxygen Order 1 liter(s) by nasal cannula continuously with use of portable tank, up to 4 liters with exercise. Expected treatment length is 2 months.. Test on conserving device as applicable.    Patients who qualify for home O2 coverage under the CMS guidelines require ABG tests or O2 sat readings obtained closest to, but no earlier than 2 days prior to the discharge, as evidence of the need for home oxygen therapy. Testing must be performed while patient is in the chronic stable state. See notes for O2 sats.    I certify that this patient, Aden Rosa has been under my care and that I, or a nurse practitioner or physician's assistant working with me, had a face-to-face encounter that meets the face-to-face encounter requirements with this patient on 4/7/2019. The patient, Aden Rosa was evaluated or treated in whole, or in part, for the following medical condition, which necessitates the use of the ordered oxygen. Treatment Diagnosis: pulmonary fibrosis    Attending Provider: Won Michael  Physician signature: See electronic signature associated with these discharge orders  Date of Order: April 7, 2019     Diet    Follow this diet upon discharge: Orders Placed This Encounter      Combination Diet Regular Diet Adult     Discharge Medications   Current Discharge Medication List      START taking these medications    Details   amoxicillin-clavulanate (AUGMENTIN) 875-125 MG tablet Take 1 tablet by mouth every 12 hours for 7 days  Qty: 14 tablet, Refills: 0    Associated Diagnoses: Pulmonary fibrosis, unspecified (H)      furosemide (LASIX) 20 MG tablet Take 1 tablet (20 mg) by mouth daily  Qty:  30 tablet, Refills: 0    Associated Diagnoses: Essential hypertension      potassium chloride ER (K-DUR/KLOR-CON M) 20 MEQ CR tablet Take 1 tablet (20 mEq) by mouth daily  Qty: 30 tablet, Refills: 0    Associated Diagnoses: Essential hypertension         CONTINUE these medications which have NOT CHANGED    Details   acyclovir (ZOVIRAX) 400 MG tablet Take 400 mg by mouth 2 times daily At 7am and 7pm      amitriptyline (ELAVIL) 10 MG tablet Take 10 mg by mouth every evening At 7pm      gabapentin (NEURONTIN) 600 MG tablet Take 4 tablets per day: 1 at 7am, 1.5 tabs at noon, and 1.5 tabs at 7pm      ixazomib (NINLARO) 2.3 MG capsule Take 2.3 mg by mouth every 7 days On Wednesday on days 1,8, and 15 of 28 day cycle      loperamide (IMODIUM A-D) 2 MG tablet Take 2 mg by mouth 2 times daily at 7am and 7pm      omeprazole (PRILOSEC) 40 MG capsule Take 40 mg by mouth daily at 7am      oxyCODONE (OXYCONTIN) 10 MG 12 hr tablet Take 4 tablets per day: 2 at 7am, 1 tablet at noon, and 1 at 7pm      oxyCODONE HCl (OXAYDO) 5 MG TABA Take 5 mg by mouth (1 tablet) at 7am, noon, and 7pm and 1 additional tablet as needed per day      tamsulosin (FLOMAX) 0.4 MG capsule Take 0.4 mg by mouth every evening at 7pm         STOP taking these medications       aspirin 81 MG EC tablet Comments:   Reason for Stopping:         levofloxacin (LEVAQUIN) 750 MG tablet Comments:   Reason for Stopping:         potassium chloride ER (K-TAB/KLOR-CON) 10 MEQ CR tablet Comments:   Reason for Stopping:             Allergies   Allergies   Allergen Reactions     Hydromet [Hydrocodone W/Homatropine]      Prochlorperazine Maleate      Compazine       Data   Most Recent 3 CBC's:  Recent Labs   Lab Test 04/07/19  0510 04/06/19  0515 04/05/19  0412   WBC 4.5 3.2* 2.9*   HGB 9.8* 9.7* 9.3*   * 101* 103*   PLT 73* 60* 52*      Most Recent 3 BMP's:  Recent Labs   Lab Test 04/07/19  0510 04/06/19  0515 04/05/19  0412    143 145*   POTASSIUM 2.8* 2.6* 3.1*    CHLORIDE 95* 100 104   CO2 38* 34* 34*   BUN 9 10 9   CR 1.36* 1.33* 1.42*   ANIONGAP 6 9 7   OLAYINKA 8.4* 8.7 8.2*    98 99     Most Recent 2 LFT's:  Recent Labs   Lab Test 04/03/19  0745 03/31/19  1914   * 151*   ALT 37 50   ALKPHOS 33* 44   BILITOTAL 0.8 0.8     Most Recent INR's and Anticoagulation Dosing History:  Anticoagulation Dose History     Recent Dosing and Labs Latest Ref Rng & Units 3/15/2011 3/21/2011 3/24/2011 3/25/2011 3/25/2011 3/28/2011 3/28/2011    INR 0.86 - 1.14 1.02 1.09 1.10 1.14 1.14 1.12 1.15(H)        Most Recent 3 Troponin's:  Recent Labs   Lab Test 03/25/11  0403 03/24/11  1649 03/24/11  0845   TROPI 0.023 0.018 0.186*     Most Recent Cholesterol Panel:  Recent Labs   Lab Test 03/28/11  0310   TRIG 57     Most Recent 6 Bacteria Isolates From Any Culture (See EPIC Reports for Culture Details):  Recent Labs   Lab Test 03/31/19  1915 04/08/11  0949 03/30/11  1238 03/29/11  1117 03/29/11  1116 03/29/11  0830   CULT No growth after 5 days  No growth after 5 days No growth No VRE isolated No yeast isolated No yeast isolated No yeast isolated     Most Recent TSH, T4 and A1c Labs:  Recent Labs   Lab Test 03/14/13  1032   TSH 1.32   T4 0.92     Results for orders placed or performed during the hospital encounter of 03/31/19   XR Chest Port 1 View    Narrative    PROCEDURE:  XR CHEST PORT 1 VW    HISTORY:  hypoxia, fever.     COMPARISON:  CT chest 3/29/2018, chest x-ray 3/14/2013    FINDINGS:   The cardiac silhouette is enlarged but stable. The pulmonary  vasculature is normal.  There are airspace opacities in the right lung  base. There is also mild left basilar atelectasis. No pleural effusion  or pneumothorax.      Impression    IMPRESSION:  Right basilar infiltrate. Mild left basilar atelectasis  or infiltrate. Suggest continued follow-up to resolution.      ELIZABETH BENAVIDES MD   XR Chest 2 Views    Narrative    PROCEDURE:  XR CHEST 2 VW    HISTORY:  pneumonia.     COMPARISON:   3/31/2019    FINDINGS:   The cardiac silhouette is stable in size. The pulmonary vasculature is  normal.  There are persistent airspace opacities in the right lower  lung, unchanged. Left basilar airspace disease is also unchanged. No  pleural effusion or pneumothorax.      Impression    IMPRESSION:  Bibasilar atelectasis or infiltrate, unchanged.      ELIZABETH BENAVIDES MD   XR Chest Port 1 View    Narrative    Procedure:XR CHEST PORT 1 VW    Clinical history:Male, 79 years, follow-up pneumonia    Technique: Single view was obtained.    Comparison: 4/1/2019    Findings: The cardiac silhouette is normal. The pulmonary vasculature  is normal.    The lungs demonstrate residual opacification at the right lung base  suggesting minimal pneumonia and right basilar atelectasis. Bony  structures are unremarkable.      Impression    Impression:   Interval improvement with improved aeration and decreasing volume of  pneumonia in the right lung.     Minimal pneumonia persists with mild atelectasis.    RODDY POWERS MD       Study Result     PROCEDURE: CT CHEST W CONTRAST 3/29/2018 11:10 AM     HISTORY: ; History of bone cancer; Chest pain, unspecified type     COMPARISONS: 2011     Meds/Dose Given: ISOVUE 300  100ML     TECHNIQUE: CT scan of the chest with IV contrast     FINDINGS: There is some lucency seen in the manubrium. There is some  lucency seen in the body of the sternum. These are seen stable and  unchanged from 2011. There is some lucency seen in the proximal  humerus on the left which are stable. There is also some lucency seen  in the glenoid on the left also stable. In the thoracic and upper  lumbar spine there are some lucent areas which are stable from  previous examination. There is an expansile lesion in the right lower  ribs and to a lesser extent in the left lower ribs. The expansion  appears improved on the right unchanged on the left as compared to  2011. There is some linear scarring at the lung bases.  There is a 5 mm  diameter nodule in the right lower lobe which is stable from previous  examination. There is a 4 mm diameter nodules the right lower lobe  seen on axial image 37 also stable from previous examination. There is  a slight hiatal hernia. The hilar or mediastinal lymph nodes appear  normal. Heart size is normal. The upper portion of the liver spleen  and pancreas appear normal. There are no adrenal masses. There is a  benign-appearing cysts seen in the left kidney                                                                        IMPRESSION: Multiple lucent lesions are stable from 2011. Pulmonary  nodules also stable, hiatal hernia also stable from previous  examination     RONEL WILLSON MD

## 2019-04-07 NOTE — PROGRESS NOTES
Discharge Note      Data:  Aden Rosa discharged to home at 3:45 pm via wheel chair. Accompanied by spouse and staff.    Action:  Written discharge/follow-up instructions were provided to patient and spouse. Prescriptions sent to patients preferred pharmacy. All belongings sent with patient.    Response:  Aden verbalized understanding of discharge instructions, reason for discharge, and necessary follow-up appointments. Went over all discharge information, as well as how to use oxygen at home multiple times. No further questions or concerns at this time.    Yimi Jeronimo RN 04/07/19 3:50 PM

## 2019-04-07 NOTE — PLAN OF CARE
Patient continues to require being on at least 2L NC. Fine crackles noted in patients lung fields bilaterally. Patient denies any pain. Able to ambulate without any weakness. Tolerating a regular diet. Anticipated discharge home today with portable oxygen. Patient went on 6 minute walk today. RT notes available. X-ray results improving. Will continue to monitor.     Yimi Jeronimo RN 04/07/19 12:20 PM

## 2019-04-07 NOTE — PHARMACY - DISCHARGE MEDICATION RECONCILIATION
[facility]  Part of [address] [address]    April 7, 2019    Dear Pharmacist,    Your customer, Aden Rosa, born on 1940, was recently discharged from [facility].  We have updated his medication list and want to alert you to the following:       Review of your medicines      START taking      Dose / Directions   amoxicillin-clavulanate 875-125 MG tablet  Commonly known as:  AUGMENTIN  Indication:  Community Acquired Pneumonia  Used for:  Pulmonary fibrosis, unspecified (H)      Dose:  1 tablet  Take 1 tablet by mouth every 12 hours for 7 days  Quantity:  14 tablet  Refills:  0     furosemide 20 MG tablet  Commonly known as:  LASIX      Dose:  20 mg  Start taking on:  4/8/2019  Take 1 tablet (20 mg) by mouth daily  Quantity:  30 tablet  Refills:  0     potassium chloride ER 20 MEQ CR tablet  Commonly known as:  K-DUR/KLOR-CON M      Dose:  20 mEq  Start taking on:  4/8/2019  Take 1 tablet (20 mEq) by mouth daily  Quantity:  30 tablet  Refills:  0        CONTINUE these medicines which have NOT CHANGED      Dose / Directions   acyclovir 400 MG tablet  Commonly known as:  ZOVIRAX      Dose:  400 mg  Take 400 mg by mouth 2 times daily At 7am and 7pm  Refills:  0     amitriptyline 10 MG tablet  Commonly known as:  ELAVIL      Dose:  10 mg  Take 10 mg by mouth every evening At 7pm  Refills:  0     gabapentin 600 MG tablet  Commonly known as:  NEURONTIN      Take 4 tablets per day: 1 at 7am, 1.5 tabs at noon, and 1.5 tabs at 7pm  Refills:  0     ixazomib 2.3 MG capsule  Commonly known as:  NINLARO      Dose:  2.3 mg  Take 2.3 mg by mouth every 7 days On Wednesday on days 1,8, and 15 of 28 day cycle  Refills:  0     loperamide 2 MG tablet  Commonly known as:  IMODIUM A-D      Dose:  2 mg  Take 2 mg by mouth 2 times daily at 7am and 7pm  Refills:  0     omeprazole 40 MG DR capsule  Commonly known as:  priLOSEC      Dose:  40 mg  Take  40 mg by mouth daily at 7am  Refills:  0     * oxyCODONE 10 MG 12 hr tablet  Commonly known as:  oxyCONTIN      Take 4 tablets per day: 2 at 7am, 1 tablet at noon, and 1 at 7pm  Refills:  0     * oxyCODONE HCl 5 MG Taba  Commonly known as:  OXAYDO      Dose:  5 mg  Take 5 mg by mouth (1 tablet) at 7am, noon, and 7pm and 1 additional tablet as needed per day  Refills:  0     tamsulosin 0.4 MG capsule  Commonly known as:  FLOMAX      Dose:  0.4 mg  Take 0.4 mg by mouth every evening at 7pm  Refills:  0         * This list has 2 medication(s) that are the same as other medications prescribed for you. Read the directions carefully, and ask your doctor or other care provider to review them with you.            STOP taking    levofloxacin 750 MG tablet  Commonly known as:  LEVAQUIN        potassium chloride ER 10 MEQ CR tablet  Commonly known as:  K-TAB/KLOR-CON              Where to get your medicines      These medications were sent to [facility] [address] [place], [place] - [address] [facility] [facility] [address]    Phone:  [phone]     amoxicillin-clavulanate 875-125 MG tablet    furosemide 20 MG tablet    potassium chloride ER 20 MEQ CR tablet         We also reviewed Aden Rosa's allergy list and updated it as needed:  Allergies: Hydromet [hydrocodone w/homatropine] and Prochlorperazine maleate    Thank you for continuing to care for Aden Rosa.  We look forward to working together with you in the future.    Sincerely,  Raúl Lee, [facility]

## 2019-04-07 NOTE — PLAN OF CARE
"Pt is here with SIRS, pneumonia and hypoxia. Unable to wean oxygen thus far. Ambulating in lawson without dyspnea. Tolerates well. Sats 86% on 2LPM via NC after walk. Up to 92% within 1 minute of rest. LS are coarse throughout. Intermittent non-productive cough. SW consult ordered for discharge planning regarding possible need for O2 if unable to wean further. 1+ edema to feet.     PRN tylenol at 2100 for foot cramps. Encouraged activity.     /61   Pulse 95   Temp 99.2  F (37.3  C) (Tympanic)   Resp 20   Ht 1.676 m (5' 6\")   Wt 93 kg (205 lb)   SpO2 92%   BMI 33.09 kg/m       0430- Oxygen saturation  spot check: 87% with one prong in nose. Adjusted cannula. Sats down to 84% with activity. Increased O2 to 3LPM. Sats up to 94% and titrated back down to 2LPM, with oxygen at 92%.   "

## 2019-04-07 NOTE — PROGRESS NOTES
Home Oxygen Assessment Tools  Patient's SpO2 on RA at rest was 84% after 1 minute. Titrated patient to 1lpm and SpO2 increased to 90%. SpO2 was 84% standing by the side of the bed on RA. Titrated to 1lpm, began 6 minute walk:   SpO2 was 85% after 1 minute of activity on 1L  -85% after 1 minute of activity on 2L   -86% after 1 minute of activity on 3L  -88% after 1 minute of activity on 4L  -88% after 2 minutes of activity on 4L       RT Deuce on 4/7/2019 at 10:43 AM

## 2019-04-07 NOTE — PHARMACY - DISCHARGE MEDICATION RECONCILIATION AND EDUCATION
Pharmacy:  Discharge Counseling and Medication Reconciliation    Aden [place]  [address] [place] [id number] [phone] (home)   79 year old male  PCP: Ramiro Olivares    Allergies: Hydromet [hydrocodone w/homatropine] and Prochlorperazine maleate    Discharge Counseling:    Pharmacist met with patient (and/or family) today to review the medication portion of the After Visit Summary (with an emphasis on NEW medications) and to address patient's questions/concerns.    Summary of Education: Provided education to patient and spouse on Augmentin and Lasix. Also discussed change in potassium dosing.  Discussed stopping Levofloxacin.    As a courtesy, writer called [facility] per wife's request to see if prescriptions were ready to . At this time, they are not ready. Will relay to patient and spouse.    Materials Provided:  MedCounselor sheets printed from Clinical Pharmacology on: Augmentin and Lasix    Discharge Medication Reconciliation:    Raúl Lee [facility] has reviewed the patient's discharge medication orders and has compared them to the inpatient medication administration record and to what the patient was taking prior to admission - any discrepancies have been resolved.    It has been determined that the patient has an adequate supply of medications available or which can be obtained from the patient's preferred pharmacy, which HE/SHE has confirmed as: [facility] [An updated medication list will be faxed to the patient's pharmacy.]    Thank you for the consult.    Raúl Lee RPH........April 7, 2019 2:11 PM

## 2019-04-08 LAB
BACTERIA SPEC CULT: NORMAL
BACTERIA SPEC CULT: NORMAL
SPECIMEN SOURCE: NORMAL
SPECIMEN SOURCE: NORMAL

## 2019-04-10 RX ORDER — POTASSIUM CHLORIDE 1500 MG/1
TABLET, EXTENDED RELEASE ORAL
Qty: 90 TABLET | Refills: 0 | OUTPATIENT
Start: 2019-04-10

## 2019-04-10 RX ORDER — FUROSEMIDE 20 MG
TABLET ORAL
Qty: 90 TABLET | Refills: 0 | OUTPATIENT
Start: 2019-04-10

## 2019-04-10 NOTE — TELEPHONE ENCOUNTER
Chart review shows that patient with a non-[facility] PCP. PCP is Dr. Olivares at [facility] in [place]. Refills should be given by patient's PCP and not hospitalist. Writer will refuse Rx request in this encounter and will make note of above in Rx refusal to pharmacy.    Unable to complete prescription refill per RN Medication Refill Policy. Gilberto Villeda 4/10/2019 12:57 PM

## 2019-05-15 ENCOUNTER — MEDICAL CORRESPONDENCE (OUTPATIENT)
Dept: HEALTH INFORMATION MANAGEMENT | Facility: OTHER | Age: 79
End: 2019-05-15

## 2019-07-01 ENCOUNTER — MEDICAL CORRESPONDENCE (OUTPATIENT)
Dept: HEALTH INFORMATION MANAGEMENT | Facility: OTHER | Age: 79
End: 2019-07-01

## 2019-08-10 ENCOUNTER — HOSPITAL ENCOUNTER (INPATIENT)
Facility: HOSPITAL | Age: 79
LOS: 3 days | Discharge: HOME | DRG: 193 | End: 2019-08-13
Attending: FAMILY MEDICINE | Admitting: INTERNAL MEDICINE
Payer: MEDICARE

## 2019-08-10 ENCOUNTER — APPOINTMENT (OUTPATIENT)
Dept: GENERAL RADIOLOGY | Facility: HOSPITAL | Age: 79
DRG: 193 | End: 2019-08-10
Attending: FAMILY MEDICINE
Payer: MEDICARE

## 2019-08-10 DIAGNOSIS — F32.9 REACTIVE DEPRESSION: Primary | ICD-10-CM

## 2019-08-10 DIAGNOSIS — J18.9 PNEUMONIA OF RIGHT LUNG DUE TO INFECTIOUS ORGANISM, UNSPECIFIED PART OF LUNG: ICD-10-CM

## 2019-08-10 DIAGNOSIS — C90.00 MULTIPLE MYELOMA, REMISSION STATUS UNSPECIFIED (H): ICD-10-CM

## 2019-08-10 LAB
ALBUMIN SERPL-MCNC: 3.3 G/DL (ref 3.4–5)
ALBUMIN UR-MCNC: 10 MG/DL
ALP SERPL-CCNC: 102 U/L (ref 40–150)
ALT SERPL W P-5'-P-CCNC: 112 U/L (ref 0–70)
ANION GAP SERPL CALCULATED.3IONS-SCNC: 6 MMOL/L (ref 3–14)
APPEARANCE UR: CLEAR
AST SERPL W P-5'-P-CCNC: 142 U/L (ref 0–45)
BACTERIA #/AREA URNS HPF: ABNORMAL /HPF
BASOPHILS # BLD AUTO: 0 10E9/L (ref 0–0.2)
BASOPHILS NFR BLD AUTO: 0.1 %
BILIRUB SERPL-MCNC: 1.2 MG/DL (ref 0.2–1.3)
BILIRUB UR QL STRIP: NEGATIVE
BUN SERPL-MCNC: 40 MG/DL (ref 7–30)
CALCIUM SERPL-MCNC: 8.6 MG/DL (ref 8.5–10.1)
CHLORIDE SERPL-SCNC: 107 MMOL/L (ref 94–109)
CO2 SERPL-SCNC: 25 MMOL/L (ref 20–32)
COLOR UR AUTO: ABNORMAL
CREAT SERPL-MCNC: 2.04 MG/DL (ref 0.66–1.25)
CRP SERPL-MCNC: 33.6 MG/L (ref 0–8)
D DIMER PPP DDU-MCNC: 306 NG/ML D-DU (ref 0–300)
DIFFERENTIAL METHOD BLD: ABNORMAL
EOSINOPHIL # BLD AUTO: 0 10E9/L (ref 0–0.7)
EOSINOPHIL NFR BLD AUTO: 0.1 %
ERYTHROCYTE [DISTWIDTH] IN BLOOD BY AUTOMATED COUNT: 15.2 % (ref 10–15)
GFR SERPL CREATININE-BSD FRML MDRD: 30 ML/MIN/{1.73_M2}
GLUCOSE SERPL-MCNC: 117 MG/DL (ref 70–99)
GLUCOSE UR STRIP-MCNC: NEGATIVE MG/DL
HCT VFR BLD AUTO: 32.6 % (ref 40–53)
HGB BLD-MCNC: 11 G/DL (ref 13.3–17.7)
HGB UR QL STRIP: ABNORMAL
IMM GRANULOCYTES # BLD: 0.1 10E9/L (ref 0–0.4)
IMM GRANULOCYTES NFR BLD: 0.5 %
KETONES UR STRIP-MCNC: NEGATIVE MG/DL
LACTATE BLD-SCNC: 1.8 MMOL/L (ref 0.7–2)
LEUKOCYTE ESTERASE UR QL STRIP: NEGATIVE
LYMPHOCYTES # BLD AUTO: 0.7 10E9/L (ref 0.8–5.3)
LYMPHOCYTES NFR BLD AUTO: 7.3 %
MCH RBC QN AUTO: 34.2 PG (ref 26.5–33)
MCHC RBC AUTO-ENTMCNC: 33.7 G/DL (ref 31.5–36.5)
MCV RBC AUTO: 101 FL (ref 78–100)
MONOCYTES # BLD AUTO: 1.3 10E9/L (ref 0–1.3)
MONOCYTES NFR BLD AUTO: 14.6 %
MUCOUS THREADS #/AREA URNS LPF: PRESENT /LPF
NEUTROPHILS # BLD AUTO: 7 10E9/L (ref 1.6–8.3)
NEUTROPHILS NFR BLD AUTO: 77.4 %
NITRATE UR QL: NEGATIVE
NRBC # BLD AUTO: 0 10*3/UL
NRBC BLD AUTO-RTO: 0 /100
NT-PROBNP SERPL-MCNC: 252 PG/ML (ref 0–1800)
PH UR STRIP: 5.5 PH (ref 4.7–8)
PLATELET # BLD AUTO: 91 10E9/L (ref 150–450)
POTASSIUM SERPL-SCNC: 4.9 MMOL/L (ref 3.4–5.3)
PROT SERPL-MCNC: 7.3 G/DL (ref 6.8–8.8)
RBC # BLD AUTO: 3.22 10E12/L (ref 4.4–5.9)
RBC #/AREA URNS AUTO: 25 /HPF (ref 0–2)
SODIUM SERPL-SCNC: 138 MMOL/L (ref 133–144)
SOURCE: ABNORMAL
SP GR UR STRIP: 1.02 (ref 1–1.03)
UROBILINOGEN UR STRIP-MCNC: NORMAL MG/DL (ref 0–2)
WBC # BLD AUTO: 9.1 10E9/L (ref 4–11)
WBC #/AREA URNS AUTO: 3 /HPF (ref 0–5)

## 2019-08-10 PROCEDURE — 83880 ASSAY OF NATRIURETIC PEPTIDE: CPT | Performed by: FAMILY MEDICINE

## 2019-08-10 PROCEDURE — 36415 COLL VENOUS BLD VENIPUNCTURE: CPT | Performed by: FAMILY MEDICINE

## 2019-08-10 PROCEDURE — 25000132 ZZH RX MED GY IP 250 OP 250 PS 637: Mod: GY | Performed by: INTERNAL MEDICINE

## 2019-08-10 PROCEDURE — 85379 FIBRIN DEGRADATION QUANT: CPT | Performed by: FAMILY MEDICINE

## 2019-08-10 PROCEDURE — 81001 URINALYSIS AUTO W/SCOPE: CPT | Performed by: FAMILY MEDICINE

## 2019-08-10 PROCEDURE — 96375 TX/PRO/DX INJ NEW DRUG ADDON: CPT

## 2019-08-10 PROCEDURE — 96374 THER/PROPH/DIAG INJ IV PUSH: CPT

## 2019-08-10 PROCEDURE — 99285 EMERGENCY DEPT VISIT HI MDM: CPT | Mod: 25

## 2019-08-10 PROCEDURE — 99285 EMERGENCY DEPT VISIT HI MDM: CPT | Mod: Z6 | Performed by: FAMILY MEDICINE

## 2019-08-10 PROCEDURE — 25000128 H RX IP 250 OP 636: Performed by: FAMILY MEDICINE

## 2019-08-10 PROCEDURE — 25800030 ZZH RX IP 258 OP 636: Performed by: FAMILY MEDICINE

## 2019-08-10 PROCEDURE — 99223 1ST HOSP IP/OBS HIGH 75: CPT | Mod: AI | Performed by: INTERNAL MEDICINE

## 2019-08-10 PROCEDURE — 80053 COMPREHEN METABOLIC PANEL: CPT | Performed by: FAMILY MEDICINE

## 2019-08-10 PROCEDURE — 86140 C-REACTIVE PROTEIN: CPT | Performed by: FAMILY MEDICINE

## 2019-08-10 PROCEDURE — 40000786 ZZHCL STATISTIC ACTIVE MRSA SURVEILLANCE CULTURE: Performed by: INTERNAL MEDICINE

## 2019-08-10 PROCEDURE — 85025 COMPLETE CBC W/AUTO DIFF WBC: CPT | Performed by: FAMILY MEDICINE

## 2019-08-10 PROCEDURE — 87070 CULTURE OTHR SPECIMN AEROBIC: CPT | Performed by: FAMILY MEDICINE

## 2019-08-10 PROCEDURE — 71046 X-RAY EXAM CHEST 2 VIEWS: CPT | Mod: TC

## 2019-08-10 PROCEDURE — 25000128 H RX IP 250 OP 636: Performed by: INTERNAL MEDICINE

## 2019-08-10 PROCEDURE — 87205 SMEAR GRAM STAIN: CPT | Performed by: FAMILY MEDICINE

## 2019-08-10 PROCEDURE — 87040 BLOOD CULTURE FOR BACTERIA: CPT | Performed by: FAMILY MEDICINE

## 2019-08-10 PROCEDURE — 85049 AUTOMATED PLATELET COUNT: CPT | Performed by: INTERNAL MEDICINE

## 2019-08-10 PROCEDURE — 83605 ASSAY OF LACTIC ACID: CPT | Performed by: FAMILY MEDICINE

## 2019-08-10 PROCEDURE — 25000132 ZZH RX MED GY IP 250 OP 250 PS 637: Mod: GY | Performed by: FAMILY MEDICINE

## 2019-08-10 PROCEDURE — 12000000 ZZH R&B MED SURG/OB

## 2019-08-10 RX ORDER — LOPERAMIDE HCL 2 MG
2 CAPSULE ORAL 3 TIMES DAILY
Status: DISCONTINUED | OUTPATIENT
Start: 2019-08-10 | End: 2019-08-11

## 2019-08-10 RX ORDER — OXYCODONE HCL 10 MG/1
10 TABLET, FILM COATED, EXTENDED RELEASE ORAL EVERY 12 HOURS
Status: DISCONTINUED | OUTPATIENT
Start: 2019-08-10 | End: 2019-08-10

## 2019-08-10 RX ORDER — OXYCODONE HCL 10 MG/1
10 TABLET, FILM COATED, EXTENDED RELEASE ORAL DAILY
Status: DISCONTINUED | OUTPATIENT
Start: 2019-08-11 | End: 2019-08-13 | Stop reason: HOSPADM

## 2019-08-10 RX ORDER — OXYCODONE HCL 20 MG/1
20 TABLET, FILM COATED, EXTENDED RELEASE ORAL DAILY
Status: DISCONTINUED | OUTPATIENT
Start: 2019-08-11 | End: 2019-08-13 | Stop reason: HOSPADM

## 2019-08-10 RX ORDER — OXYCODONE HYDROCHLORIDE 5 MG/1
5 TABLET ORAL ONCE
Status: COMPLETED | OUTPATIENT
Start: 2019-08-10 | End: 2019-08-10

## 2019-08-10 RX ORDER — NALOXONE HYDROCHLORIDE 0.4 MG/ML
.1-.4 INJECTION, SOLUTION INTRAMUSCULAR; INTRAVENOUS; SUBCUTANEOUS
Status: DISCONTINUED | OUTPATIENT
Start: 2019-08-10 | End: 2019-08-13 | Stop reason: HOSPADM

## 2019-08-10 RX ORDER — GABAPENTIN 300 MG/1
900 CAPSULE ORAL ONCE
Status: COMPLETED | OUTPATIENT
Start: 2019-08-10 | End: 2019-08-10

## 2019-08-10 RX ORDER — SODIUM CHLORIDE 9 MG/ML
1000 INJECTION, SOLUTION INTRAVENOUS CONTINUOUS
Status: DISCONTINUED | OUTPATIENT
Start: 2019-08-10 | End: 2019-08-10

## 2019-08-10 RX ORDER — ACYCLOVIR 400 MG/1
400 TABLET ORAL 2 TIMES DAILY
Status: DISCONTINUED | OUTPATIENT
Start: 2019-08-10 | End: 2019-08-13 | Stop reason: HOSPADM

## 2019-08-10 RX ORDER — GABAPENTIN 300 MG/1
900 CAPSULE ORAL DAILY
Status: DISCONTINUED | OUTPATIENT
Start: 2019-08-11 | End: 2019-08-13 | Stop reason: HOSPADM

## 2019-08-10 RX ORDER — AMITRIPTYLINE HYDROCHLORIDE 10 MG/1
10 TABLET ORAL EVERY EVENING
Status: DISCONTINUED | OUTPATIENT
Start: 2019-08-10 | End: 2019-08-13 | Stop reason: HOSPADM

## 2019-08-10 RX ORDER — FUROSEMIDE 20 MG
20 TABLET ORAL DAILY
Status: DISCONTINUED | OUTPATIENT
Start: 2019-08-11 | End: 2019-08-11

## 2019-08-10 RX ORDER — ACETAMINOPHEN 325 MG/1
650 TABLET ORAL EVERY 4 HOURS PRN
Status: DISCONTINUED | OUTPATIENT
Start: 2019-08-10 | End: 2019-08-13 | Stop reason: HOSPADM

## 2019-08-10 RX ORDER — POTASSIUM CHLORIDE 1500 MG/1
20 TABLET, EXTENDED RELEASE ORAL DAILY
Status: DISCONTINUED | OUTPATIENT
Start: 2019-08-11 | End: 2019-08-13 | Stop reason: HOSPADM

## 2019-08-10 RX ORDER — LOPERAMIDE HCL 2 MG
2 CAPSULE ORAL ONCE
Status: COMPLETED | OUTPATIENT
Start: 2019-08-10 | End: 2019-08-10

## 2019-08-10 RX ORDER — HEPARIN SODIUM 5000 [USP'U]/.5ML
5000 INJECTION, SOLUTION INTRAVENOUS; SUBCUTANEOUS EVERY 12 HOURS
Status: DISCONTINUED | OUTPATIENT
Start: 2019-08-10 | End: 2019-08-10

## 2019-08-10 RX ORDER — OXYCODONE HCL 10 MG/1
10 TABLET, FILM COATED, EXTENDED RELEASE ORAL EVERY 12 HOURS
Status: DISCONTINUED | OUTPATIENT
Start: 2019-08-11 | End: 2019-08-10

## 2019-08-10 RX ORDER — GABAPENTIN 600 MG/1
600 TABLET ORAL DAILY
Status: DISCONTINUED | OUTPATIENT
Start: 2019-08-10 | End: 2019-08-13 | Stop reason: HOSPADM

## 2019-08-10 RX ORDER — SODIUM CHLORIDE 9 MG/ML
INJECTION, SOLUTION INTRAVENOUS CONTINUOUS
Status: DISCONTINUED | OUTPATIENT
Start: 2019-08-10 | End: 2019-08-13

## 2019-08-10 RX ORDER — TAMSULOSIN HYDROCHLORIDE 0.4 MG/1
0.4 CAPSULE ORAL EVERY EVENING
Status: DISCONTINUED | OUTPATIENT
Start: 2019-08-10 | End: 2019-08-13 | Stop reason: HOSPADM

## 2019-08-10 RX ORDER — LIDOCAINE 40 MG/G
CREAM TOPICAL
Status: DISCONTINUED | OUTPATIENT
Start: 2019-08-10 | End: 2019-08-13 | Stop reason: HOSPADM

## 2019-08-10 RX ORDER — OXYCODONE HYDROCHLORIDE 5 MG/1
5 TABLET ORAL 3 TIMES DAILY
Status: DISCONTINUED | OUTPATIENT
Start: 2019-08-10 | End: 2019-08-13 | Stop reason: HOSPADM

## 2019-08-10 RX ORDER — GABAPENTIN 300 MG/1
900 CAPSULE ORAL DAILY
Status: DISCONTINUED | OUTPATIENT
Start: 2019-08-10 | End: 2019-08-13 | Stop reason: HOSPADM

## 2019-08-10 RX ORDER — OXYCODONE HCL 10 MG/1
10 TABLET, FILM COATED, EXTENDED RELEASE ORAL DAILY
Status: DISCONTINUED | OUTPATIENT
Start: 2019-08-10 | End: 2019-08-13 | Stop reason: HOSPADM

## 2019-08-10 RX ORDER — OXYCODONE HYDROCHLORIDE 5 MG/1
5 TABLET ORAL DAILY PRN
Status: DISCONTINUED | OUTPATIENT
Start: 2019-08-10 | End: 2019-08-13 | Stop reason: HOSPADM

## 2019-08-10 RX ADMIN — AMITRIPTYLINE HYDROCHLORIDE 10 MG: 10 TABLET, FILM COATED ORAL at 19:41

## 2019-08-10 RX ADMIN — ACYCLOVIR 400 MG: 400 TABLET ORAL at 19:40

## 2019-08-10 RX ADMIN — TAZOBACTAM SODIUM AND PIPERACILLIN SODIUM 3.38 G: 375; 3 INJECTION, SOLUTION INTRAVENOUS at 19:46

## 2019-08-10 RX ADMIN — GABAPENTIN 900 MG: 300 CAPSULE ORAL at 19:39

## 2019-08-10 RX ADMIN — LOPERAMIDE HYDROCHLORIDE 2 MG: 2 CAPSULE ORAL at 12:39

## 2019-08-10 RX ADMIN — GABAPENTIN 900 MG: 300 CAPSULE ORAL at 12:39

## 2019-08-10 RX ADMIN — OXYCODONE HYDROCHLORIDE 5 MG: 5 TABLET ORAL at 12:39

## 2019-08-10 RX ADMIN — TAZOBACTAM SODIUM AND PIPERACILLIN SODIUM 4.5 G: 500; 4 INJECTION, SOLUTION INTRAVENOUS at 13:42

## 2019-08-10 RX ADMIN — TAMSULOSIN HYDROCHLORIDE 0.4 MG: 0.4 CAPSULE ORAL at 19:40

## 2019-08-10 RX ADMIN — OXYCODONE HYDROCHLORIDE 10 MG: 10 TABLET, FILM COATED, EXTENDED RELEASE ORAL at 19:40

## 2019-08-10 RX ADMIN — SODIUM CHLORIDE 1000 ML: 9 INJECTION, SOLUTION INTRAVENOUS at 12:08

## 2019-08-10 RX ADMIN — LOPERAMIDE HYDROCHLORIDE 2 MG: 2 CAPSULE ORAL at 19:40

## 2019-08-10 RX ADMIN — AZITHROMYCIN 500 MG: 500 INJECTION, POWDER, LYOPHILIZED, FOR SOLUTION INTRAVENOUS at 14:19

## 2019-08-10 RX ADMIN — SODIUM CHLORIDE 1000 ML: 9 INJECTION, SOLUTION INTRAVENOUS at 14:18

## 2019-08-10 RX ADMIN — OXYCODONE HYDROCHLORIDE 10 MG: 10 TABLET, FILM COATED, EXTENDED RELEASE ORAL at 12:39

## 2019-08-10 RX ADMIN — OXYCODONE HYDROCHLORIDE 5 MG: 5 TABLET ORAL at 19:41

## 2019-08-10 ASSESSMENT — ENCOUNTER SYMPTOMS
CONSTIPATION: 0
BACK PAIN: 1
VOMITING: 0
DIARRHEA: 1
ABDOMINAL PAIN: 0
DYSURIA: 0
NAUSEA: 0
DYSPHORIC MOOD: 1
PALPITATIONS: 0
FREQUENCY: 0
TREMORS: 1
WHEEZING: 0
COUGH: 1
SHORTNESS OF BREATH: 1
CHILLS: 1
WEAKNESS: 1
FEVER: 1
ACTIVITY CHANGE: 1
FATIGUE: 1

## 2019-08-10 ASSESSMENT — ACTIVITIES OF DAILY LIVING (ADL): ADLS_ACUITY_SCORE: 14

## 2019-08-10 NOTE — H&P
[facility]    History and Physical  Hospitalist       Date of Admission:  8/10/2019    Assessment & Plan   Aden Rosa is a 79 year old male with hx multiple myeloma s/p autologous stem cell transplant 2011, HTN, and chronic back pain who presents with weakness, fatigue and low O2 sats at home. He is admitted for acute hypoxic respiratory failure due to community acquired pneumonia.     Acute hypoxic respiratory failure  Presumed secondary to community acquired pneumonia  He is at risk for gram negative infection as well as drug resistant organisms due to multiple abx courses in past 6 months and immunosuppression, so will continue him empirically on Zosyn and Azithromycin. Will work to wean oxygen as able. He is not producing sputum or coughing. Cannot rule out possibility of non-bacterial cause for pneumonia or possibly even a more chronic infectious or even non-infectious cause given this is his fourth bout in the the last 6 months. With his mild transaminitis, will send studies for EBV and CMV. Hold his Ixazomib.     Multiple myeloma  Follows with Dr. Jennyfer Bustillo at [facility]. Has IgA kappa multiple myeloma diagnosed 7/2010. Treated with 4 cycles Revlimid and 2 cycles Velcade/Decadron before proceeding to autologous stem cell transplant on 3/17/11 at [facility]. Was on maintenance Revlimid. Transitioned to Ixazomib due to worsening paraproteinemia. He is at reduced dose now due to worsening paresthesias. He has multiple known stable lytic lesions involving the bilateral humeri and also calvarial lesions. Repeat skeletal survey is planned for 9/2019. He was on Zometa infusions, but they were discontinued after he developed left lower jaw necrosis.    Acute kidney injury on chronic kidney disease, stage 3  Baseline creatinine of 1.3-1.5 and his chronic kidney disease likely related to multiple myeloma involvement. Cr now up to 2. Possible he may be a little dry, maybe some prerenal azotemia. Will  try modest IVF and monitor renal function    Transaminitis  His ALT and AST are mildly elevated. They were normal on 7/18/19 per [facility]'s record. Will send off CMV and EBV given he is on immunosuppressants. Could be hepatotoxicity related to his Ixazomib. Follow hepatic panel for now.     Macrocytic anemia  Thrombocytopenia  Likely due to multiple myeloma and chemotherapy. Monitor    Hypertension  BP stable.     Peripheral neuropathy  Continue with Neurontin, Amitriptyline, Oxycontin, Oxycodone    Chronic low back pain:  Related to spinal stenosis. Last MRI was 8/2011. He is s/p prior L2-3 hemilaminectomy over 10 years ago withpartial facetectomy. Continue with his Oxycontin and Oxycodone    DVT Prophylaxis: Pneumatic Compression Devices  Code Status: DNR / DNI    Disposition: Expected discharge in 2-3 days once afebrile, cardiopulmonary status stable, able to convert to oral antibiotics    London Iniguez    Primary Care Physician   Ramiro Olivares    Chief Complaint   Weakness, fatigue and low O2 sats    History is obtained from the patient    History of Present Illness   Aden Rosa is a 79 year old male with hx multiple myeloma s/p autologous stem cell transplant 2011, HTN, and chronic back pain who presents with weakness, fatigue and low O2 sats at home. His wife is with him and reports that he has had pneumonia three times in the past month. Yesterday she noted he was very weak and fatigued, closing his eyes even while eating. She tried to encourage him to come in, but he refused. Today, she noted his O2 sat on room air was 85% and he was still very weak. She reports that his O2 sat generally is in the low 90s at baseline. His last bout of pneumonia was in April. He was sent home on oxygen, but weaned off after about a week. He denies any cough, dyspnea, fever, chills. His appetite has been unchanged. No nausea or vomiting. He has simply been very weak and tired. He does have chronic diarrhea which  is felt an adverse effect from his Ixazomib. He has had stool incontinence if he does not take his Imodium in a strict scheduled manner. His last dose was August 7. He is on cycle #14. He also has peripheral neuropathy related to past chemo and SCT as well as chronic low back pain related to spinal stenosis. He has had prior back surgery and recommendation has been no further back surgery due to his multiple myeloma. He was febrile to 101.3 in the ED. No leukocytosis noted. Sats were initially 88% room air, improving to low 90s on 2 L nasal cannula. He was mildly tachycardic.  CXR showed patchy right-sided infiltrate, asymmetrical with the contralateral side.  Does not appear to be in respiratory distress.  Lactic acid was 1.8. Creatinine was elevated to 2.04 from baseline of 1.3-1.5. Urine shows small blood, but otherwise unremarkable. Blood cultures drawn and he was treated with IV Azithromycin and IV Zosyn in the ED.     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  Past Surgical History:   Procedure Laterality Date     BACK SURGERY  2010    Microsurgery     C TOTAL KNEE ARTHROPLASTY Right      CHOLECYSTECTOMY       HEMORRHOIDECTOMY      x2       Prior to Admission Medications   Prior to Admission Medications   Prescriptions Last Dose Informant Patient Reported? Taking?   acyclovir (ZOVIRAX) 400 MG tablet 8/10/2019 at 0700  Yes Yes   Sig: Take 400 mg by mouth 2 times daily At 7am and 7pm   amitriptyline (ELAVIL) 10 MG tablet 8/9/2019 at 1900  Yes Yes   Sig: Take 10 mg by mouth every evening At 7pm   amoxicillin-clavulanate  (AUGMENTIN) 875-125 MG tablet   No No   Sig: Take 1 tablet by mouth every 12 hours for 7 days   furosemide (LASIX) 20 MG tablet 8/10/2019 at 0700  No Yes   Sig: Take 1 tablet (20 mg) by mouth daily   gabapentin (NEURONTIN) 600 MG tablet 8/10/2019 at 1200  Yes Yes   Sig: Take 4 tablets per day: 1 at 7am, 1.5 tabs at noon, and 1.5 tabs at 7pm   ixazomib (NINLARO) 2.3 MG capsule Past Week at Unknown time  Yes Yes   Sig: Take 2.3 mg by mouth every 7 days On Wednesday on days 1,8, and 15 of 28 day cycle   loperamide (IMODIUM A-D) 2 MG tablet 8/10/2019 at 1200  Yes Yes   Sig: Take 2 mg by mouth 3 times daily as needed at 7am and 7pm    omeprazole (PRILOSEC) 40 MG capsule 8/10/2019 at 0700  Yes Yes   Sig: Take 40 mg by mouth daily at 7am   oxyCODONE (OXYCONTIN) 10 MG 12 hr tablet 8/10/2019 at 1200  Yes Yes   Sig: Take 4 tablets per day: 2 at 7am, 1 tablet at noon, and 1 at 7pm   oxyCODONE HCl (OXAYDO) 5 MG TABA 8/10/2019 at 1200  Yes Yes   Sig: Take 5 mg by mouth (1 tablet) at 7am, noon, and 7pm and 1 additional tablet as needed per day   potassium chloride ER (K-DUR/KLOR-CON M) 20 MEQ CR tablet 8/10/2019 at 0700  No Yes   Sig: Take 1 tablet (20 mEq) by mouth daily   tamsulosin (FLOMAX) 0.4 MG capsule 8/9/2019 at 1900  Yes Yes   Sig: Take 0.4 mg by mouth every evening at 7pm      Facility-Administered Medications: None     Allergies   Allergies   Allergen Reactions     Hydromet [Hydrocodone W/Homatropine]      Prochlorperazine Maleate      Compazine         Social History   Social History     Socioeconomic History     Marital status:      Spouse name: Not on file     Number of children: Not on file     Years of education: Not on file     Highest education level: Not on file   Occupational History     Not on file   Social Needs     Financial resource strain: Not on file     Food insecurity:     Worry: Not on file     Inability: Not on file     Transportation needs:     Medical: Not on file     Non-medical: Not on  file   Tobacco Use     Smoking status: Never Smoker     Smokeless tobacco: Never Used   Substance and Sexual Activity     Alcohol use: No     Drug use: No     Sexual activity: Not on file   Lifestyle     Physical activity:     Days per week: Not on file     Minutes per session: Not on file     Stress: Not on file   Relationships     Social connections:     Talks on phone: Not on file     Gets together: Not on file     Attends Mandaen service: Not on file     Active member of club or organization: Not on file     Attends meetings of clubs or organizations: Not on file     Relationship status: Not on file     Intimate partner violence:     Fear of current or ex partner: Not on file     Emotionally abused: Not on file     Physically abused: Not on file     Forced sexual activity: Not on file   Other Topics Concern     Parent/sibling w/ CABG, MI or angioplasty before 65F 55M? Not Asked      Service Not Asked     Blood Transfusions Not Asked     Caffeine Concern No     Occupational Exposure Not Asked     Hobby Hazards Not Asked     Sleep Concern Not Asked     Stress Concern Not Asked     Weight Concern Not Asked     Special Diet Not Asked     Back Care Not Asked     Exercise Not Asked     Bike Helmet Not Asked     Seat Belt Not Asked     Self-Exams Not Asked   Social History Narrative    Retired from working in the mines.  (wife Amanda). 2 daughters, 1 stepdaughter, 2 stepsons.       Family History   I have reviewed this patient's family history and updated it with pertinent information if needed.   Family History   Problem Relation Age of Onset     Cancer Other         hodgkins     Diabetes Other      Heart Disease Father 82        disease     Other - See Comments Other      Alzheimer Disease Mother          at 97     Diabetes Type 2  Sister        Review of Systems   The 10 point Review of Systems is negative other than noted in the HPI or here.     Physical Exam   Temp: 99.2  F (37.3  C) Temp src:  Tympanic BP: (!) 100/46 Pulse: 104 Heart Rate: 91 Resp: 20 SpO2: 93 % O2 Device: Nasal cannula Oxygen Delivery: 2 LPM  Vital Signs with Ranges  Temp:  [99.2  F (37.3  C)-101.3  F (38.5  C)] 99.2  F (37.3  C)  Pulse:  [104] 104  Heart Rate:  [] 91  Resp:  [18-20] 20  BP: (100-133)/(46-62) 100/46  SpO2:  [88 %-94 %] 93 %  0 lbs 0 oz    Constitutional:   awake, alert, cooperative, no apparent distress, and appears stated age   , Eyes:   Lids and lashes normal, pupils equal, round and reactive to light, extra ocular muscles intact, sclera clear, conjunctiva normal   , ENT:   Normocephalic, without obvious abnormality, atraumatic, external ears without lesions, oral pharynx with moist mucous membranes, tonsils without erythema or exudates, gums normal and good dentition.   , Neck:   Supple, symmetrical, trachea midline, no adenopathy, thyroid symmetric, not enlarged and no tenderness, skin normal   , Hematologic / Lymphatic:   no cervical lymphadenopathy and no supraclavicular lymphadenopathy   , Lungs:   Right lung with course ronchi, left lung with more diminished breath sounds. No wheezing   , Cardiovascular:   Normal apical impulse, regular rate and rhythm, normal S1 and S2, no S3 or S4, and no murmur noted   , Abdomen:   Cholecystectomy scar, normal bowel sounds, soft, non-distended, non-tender, no masses palpated, no hepatosplenomegally   , Musculoskeletal:   There is no redness, warmth, or swelling of the joints.  Full range of motion noted.  Motor strength is 5 out of 5 all extremities bilaterally.  Tone is normal.   , Neurologic:   Awake, alert, oriented to name, place and time.  Cranial nerves II-XII are grossly intact.  Motor is 5 out of 5 bilaterally.     , Neuropsychiatric:   General: normal, calm and normal eye contact  Affect: normal    and Skin:   normal skin color, texture, turgor, no lesions and small area of petechial rash on the anterior lower right leg just below the knee and also the anterior  lower left leg just above the ankle       Data   Data reviewed today:  I personally reviewed the chest x-ray image(s) showing findings as above.  Recent Labs   Lab 08/10/19  1205   WBC 9.1   HGB 11.0*   *   PLT 91*      POTASSIUM 4.9   CHLORIDE 107   CO2 25   BUN 40*   CR 2.04*   ANIONGAP 6   OLAYINKA 8.6   *   ALBUMIN 3.3*   PROTTOTAL 7.3   BILITOTAL 1.2   ALKPHOS 102   *   *     Lactic Acid   Date Value Ref Range Status   08/10/2019 1.8 0.7 - 2.0 mmol/L Final   03/31/2019 1.6 0.5 - 2.2 mmol/L Final       No results found for this or any previous visit (from the past 24 hour(s)).

## 2019-08-10 NOTE — ED PROVIDER NOTES
"  History     Chief Complaint   Patient presents with     Generalized Weakness     URI symptoms for two days with history of pneumonia most recently in June. Low oxygen saturations at home in the 80's. His baseline is 90-91%.     HPI  Aden Rosa is a 79 year old male who presents the emergency room with weakness and hypoxia.  He has had an upper respiratory infection type picture for 2 days, he has had a history of pneumonia 3 times in the last 6 months.  Last time he had pneumonia was in June.  On arrival in the emergency room today his oxygen was 80%, he came up into the low 90s with 2 L of oxygen.  He has multiple myeloma is on chemotherapy for that, has had a stem cell transplant previously.  He also has back problems due to pre-existing problems, but they are now saying that they do not think they can do surgery on it because his bones are \"too soft\".  As a consequence of this he is on oxycodone for pain control, he is also on gabapentin.  Apparently his chemotherapy also gives him jerking movements, they occur over his entire body and he has no control over them.  He is on Ixazomib every Wednesday.    Allergies:  Allergies   Allergen Reactions     Hydromet [Hydrocodone W/Homatropine]      Prochlorperazine Maleate      Compazine         Problem List:    Patient Active Problem List    Diagnosis Date Noted     Community acquired pneumonia of right lower lobe of lung (H) 03/31/2019     Priority: Medium     SIRS (systemic inflammatory response syndrome) (H) 03/31/2019     Priority: Medium     Sepsis due to pneumonia (H) 03/31/2019     Priority: Medium     Kidney stones      Priority: Medium     Spinal stenosis      Priority: Medium     with chronic back pain       Hypertension      Priority: Medium     Multiple myeloma (H) 03/21/2011     Priority: Medium     updating diagnosis code for icd10 cutover       Encounter for long-term current use of medication 03/21/2011     Priority: Medium     updating diagnosis " code for icd10 cutover       Stem cells transplant status (H) 2011     Priority: Medium     [facility]           Past Medical History:    Past Medical History:   Diagnosis Date     Hypertension      Kidney stones      Multiple myeloma (H)      Spinal stenosis      Stem cells transplant status (H)        Past Surgical History:    Past Surgical History:   Procedure Laterality Date     BACK SURGERY  2010    Microsurgery     C TOTAL KNEE ARTHROPLASTY Right      CHOLECYSTECTOMY       HEMORRHOIDECTOMY      x2       Family History:    Family History   Problem Relation Age of Onset     Cancer Other      Diabetes Other      Heart Disease Father 82        disease     Other - See Comments Other      Alzheimer Disease Mother          at 97     Diabetes Type 2  Sister        Social History:  Marital Status:   [2]  Social History     Tobacco Use     Smoking status: Never Smoker     Smokeless tobacco: Never Used   Substance Use Topics     Alcohol use: No     Drug use: No        Medications:      acyclovir (ZOVIRAX) 400 MG tablet   amitriptyline (ELAVIL) 10 MG tablet   furosemide (LASIX) 20 MG tablet   gabapentin (NEURONTIN) 600 MG tablet   ixazomib (NINLARO) 2.3 MG capsule   loperamide (IMODIUM A-D) 2 MG tablet   omeprazole (PRILOSEC) 40 MG capsule   oxyCODONE (OXYCONTIN) 10 MG 12 hr tablet   oxyCODONE HCl (OXAYDO) 5 MG TABA   potassium chloride ER (K-DUR/KLOR-CON M) 20 MEQ CR tablet   tamsulosin (FLOMAX) 0.4 MG capsule         Review of Systems   Constitutional: Positive for activity change, chills, fatigue and fever.   HENT: Negative.    Respiratory: Positive for cough and shortness of breath. Negative for wheezing.    Cardiovascular: Negative for chest pain and palpitations.   Gastrointestinal: Positive for diarrhea. Negative for abdominal pain, constipation, nausea and vomiting.        Diarrhea controlled with Imodium, it is a side effect of his chemo   Genitourinary: Positive for urgency. Negative for  dysuria and frequency.   Musculoskeletal: Positive for back pain.        Chronic, has spinal stenosis, see HPI   Skin: Negative.    Neurological: Positive for tremors and weakness.   Psychiatric/Behavioral: Positive for dysphoric mood.       Physical Exam   BP: 107/55  Pulse: 104  Heart Rate: 107  Temp: 101.3  F (38.5  C)  Resp: 20  SpO2: (!) 88 %      Physical Exam   Constitutional: He is oriented to person, place, and time. He appears well-developed and well-nourished. He appears distressed.   HENT:   Head: Normocephalic and atraumatic.   Neck: Normal range of motion. Neck supple.   Cardiovascular: Normal rate, regular rhythm and normal heart sounds.   No murmur heard.  Pulmonary/Chest: He is in respiratory distress. He has no wheezes. He has rales.   Abdominal: Soft. Bowel sounds are normal. He exhibits no distension. There is no tenderness.   Musculoskeletal: Normal range of motion. He exhibits no edema.   Neurological: He is alert and oriented to person, place, and time.   Skin: Skin is warm and dry. Capillary refill takes less than 2 seconds.   Psychiatric: Judgment normal. His affect is blunt. Cognition and memory are normal.       ED Course        Procedures      Results for orders placed or performed during the hospital encounter of 08/10/19 (from the past 24 hour(s))   CBC with platelets differential   Result Value Ref Range    WBC 9.1 4.0 - 11.0 10e9/L    RBC Count 3.22 (L) 4.4 - 5.9 10e12/L    Hemoglobin 11.0 (L) 13.3 - 17.7 g/dL    Hematocrit 32.6 (L) 40.0 - 53.0 %     (H) 78 - 100 fl    MCH 34.2 (H) 26.5 - 33.0 pg    MCHC 33.7 31.5 - 36.5 g/dL    RDW 15.2 (H) 10.0 - 15.0 %    Platelet Count 91 (L) 150 - 450 10e9/L    Diff Method Automated Method     % Neutrophils 77.4 %    % Lymphocytes 7.3 %    % Monocytes 14.6 %    % Eosinophils 0.1 %    % Basophils 0.1 %    % Immature Granulocytes 0.5 %    Nucleated RBCs 0 0 /100    Absolute Neutrophil 7.0 1.6 - 8.3 10e9/L    Absolute Lymphocytes 0.7 (L) 0.8  - 5.3 10e9/L    Absolute Monocytes 1.3 0.0 - 1.3 10e9/L    Absolute Eosinophils 0.0 0.0 - 0.7 10e9/L    Absolute Basophils 0.0 0.0 - 0.2 10e9/L    Abs Immature Granulocytes 0.1 0 - 0.4 10e9/L    Absolute Nucleated RBC 0.0    CRP inflammation   Result Value Ref Range    CRP Inflammation 33.6 (H) 0.0 - 8.0 mg/L   D-Dimer ([facility])   Result Value Ref Range    D-Dimer ng/mL 306 (H) 0 - 300 ng/ml D-DU   Comprehensive metabolic panel   Result Value Ref Range    Sodium 138 133 - 144 mmol/L    Potassium 4.9 3.4 - 5.3 mmol/L    Chloride 107 94 - 109 mmol/L    Carbon Dioxide 25 20 - 32 mmol/L    Anion Gap 6 3 - 14 mmol/L    Glucose 117 (H) 70 - 99 mg/dL    Urea Nitrogen 40 (H) 7 - 30 mg/dL    Creatinine 2.04 (H) 0.66 - 1.25 mg/dL    GFR Estimate 30 (L) >60 mL/min/[1.73_m2]    GFR Estimate If Black 35 (L) >60 mL/min/[1.73_m2]    Calcium 8.6 8.5 - 10.1 mg/dL    Bilirubin Total 1.2 0.2 - 1.3 mg/dL    Albumin 3.3 (L) 3.4 - 5.0 g/dL    Protein Total 7.3 6.8 - 8.8 g/dL    Alkaline Phosphatase 102 40 - 150 U/L     (H) 0 - 70 U/L     (H) 0 - 45 U/L   Nt probnp inpatient (BNP)   Result Value Ref Range    N-Terminal Pro BNP Inpatient 252 0 - 1,800 pg/mL   Lactic acid whole blood   Result Value Ref Range    Lactic Acid 1.8 0.7 - 2.0 mmol/L   UA with Microscopic reflex to Culture   Result Value Ref Range    Color Urine Light Yellow     Appearance Urine Clear     Glucose Urine Negative NEG^Negative mg/dL    Bilirubin Urine Negative NEG^Negative    Ketones Urine Negative NEG^Negative mg/dL    Specific Gravity Urine 1.019 1.003 - 1.035    Blood Urine Small (A) NEG^Negative    pH Urine 5.5 4.7 - 8.0 pH    Protein Albumin Urine 10 (A) NEG^Negative mg/dL    Urobilinogen mg/dL Normal 0.0 - 2.0 mg/dL    Nitrite Urine Negative NEG^Negative    Leukocyte Esterase Urine Negative NEG^Negative    Source Midstream Urine     WBC Urine 3 0 - 5 /HPF    RBC Urine 25 (H) 0 - 2 /HPF    Bacteria Urine None (A) NEG^Negative /HPF    Mucous Urine  Present (A) NEG^Negative /LPF       Medications   0.9% sodium chloride BOLUS (1,000 mLs Intravenous New Bag 8/10/19 1208)     Followed by   sodium chloride 0.9% infusion (has no administration in time range)   oxyCODONE (oxyCONTIN) 12 hr tablet 10 mg (10 mg Oral Given 8/10/19 1239)   piperacillin-tazobactam (ZOSYN) intermittent infusion 4.5 g (4.5 g Intravenous New Bag 8/10/19 1342)   azithromycin (ZITHROMAX) 500 mg in sodium chloride 0.9 % 250 mL intermittent infusion (has no administration in time range)   oxyCODONE (ROXICODONE) tablet 5 mg (5 mg Oral Given 8/10/19 1239)   gabapentin (NEURONTIN) capsule 900 mg (900 mg Oral Given 8/10/19 1239)   loperamide (IMODIUM) capsule 2 mg (2 mg Oral Given 8/10/19 1239)       Assessments & Plan (with Medical Decision Making)   Patient has a right-sided pneumonia that looks more involved than the one in April 2019.  He had a stem cell transplant back in 2011, has been having more problems with his multiple myeloma recently with a new medication having been started at the beginning of the year.  Oxygen saturations have been remaining in the low 90s on 2 L of oxygen.  He was started on Zosyn and azithromycin in the ER for his pneumonia and blood cultures were obtained.  Patient is attempting to get us a sputum culture but has not succeeded yet.  Patient will be admitted to medicine by Dr. Iniguez for further evaluation and treatment.    I have reviewed the nursing notes.    I have reviewed the findings, diagnosis, plan and need for follow up with the patient.  New Prescriptions    No medications on file       Final diagnoses:   Pneumonia of right lung due to infectious organism, unspecified part of lung   Multiple myeloma, remission status unspecified (H)       8/10/2019   [facility]     Alicia Marie MD  08/10/19 9843

## 2019-08-11 LAB
ALBUMIN SERPL-MCNC: 2.4 G/DL (ref 3.4–5)
ALP SERPL-CCNC: 71 U/L (ref 40–150)
ALT SERPL W P-5'-P-CCNC: 74 U/L (ref 0–70)
ANION GAP SERPL CALCULATED.3IONS-SCNC: 3 MMOL/L (ref 3–14)
AST SERPL W P-5'-P-CCNC: 68 U/L (ref 0–45)
BASOPHILS # BLD AUTO: 0 10E9/L (ref 0–0.2)
BASOPHILS NFR BLD AUTO: 0.2 %
BILIRUB DIRECT SERPL-MCNC: 0.3 MG/DL (ref 0–0.2)
BILIRUB SERPL-MCNC: 1 MG/DL (ref 0.2–1.3)
BUN SERPL-MCNC: 40 MG/DL (ref 7–30)
CALCIUM SERPL-MCNC: 7.6 MG/DL (ref 8.5–10.1)
CHLORIDE SERPL-SCNC: 110 MMOL/L (ref 94–109)
CO2 SERPL-SCNC: 28 MMOL/L (ref 20–32)
CREAT SERPL-MCNC: 1.87 MG/DL (ref 0.66–1.25)
DIFFERENTIAL METHOD BLD: ABNORMAL
EOSINOPHIL # BLD AUTO: 0.1 10E9/L (ref 0–0.7)
EOSINOPHIL NFR BLD AUTO: 0.7 %
ERYTHROCYTE [DISTWIDTH] IN BLOOD BY AUTOMATED COUNT: 15.4 % (ref 10–15)
GFR SERPL CREATININE-BSD FRML MDRD: 34 ML/MIN/{1.73_M2}
GLUCOSE SERPL-MCNC: 88 MG/DL (ref 70–99)
GRAM STN SPEC: ABNORMAL
HCT VFR BLD AUTO: 26.3 % (ref 40–53)
HGB BLD-MCNC: 8.8 G/DL (ref 13.3–17.7)
IMM GRANULOCYTES # BLD: 0.1 10E9/L (ref 0–0.4)
IMM GRANULOCYTES NFR BLD: 0.5 %
LYMPHOCYTES # BLD AUTO: 1.8 10E9/L (ref 0.8–5.3)
LYMPHOCYTES NFR BLD AUTO: 17.8 %
MCH RBC QN AUTO: 34.6 PG (ref 26.5–33)
MCHC RBC AUTO-ENTMCNC: 33.5 G/DL (ref 31.5–36.5)
MCV RBC AUTO: 104 FL (ref 78–100)
MONOCYTES # BLD AUTO: 1.3 10E9/L (ref 0–1.3)
MONOCYTES NFR BLD AUTO: 13.4 %
NEUTROPHILS # BLD AUTO: 6.7 10E9/L (ref 1.6–8.3)
NEUTROPHILS NFR BLD AUTO: 67.4 %
NRBC # BLD AUTO: 0 10*3/UL
NRBC BLD AUTO-RTO: 0 /100
PLATELET # BLD AUTO: 65 10E9/L (ref 150–450)
POTASSIUM SERPL-SCNC: 4.4 MMOL/L (ref 3.4–5.3)
PROT SERPL-MCNC: 6 G/DL (ref 6.8–8.8)
RBC # BLD AUTO: 2.54 10E12/L (ref 4.4–5.9)
SODIUM SERPL-SCNC: 141 MMOL/L (ref 133–144)
SPECIMEN SOURCE: ABNORMAL
WBC # BLD AUTO: 10 10E9/L (ref 4–11)

## 2019-08-11 PROCEDURE — 25800030 ZZH RX IP 258 OP 636: Performed by: INTERNAL MEDICINE

## 2019-08-11 PROCEDURE — 12000000 ZZH R&B MED SURG/OB

## 2019-08-11 PROCEDURE — 85025 COMPLETE CBC W/AUTO DIFF WBC: CPT | Performed by: INTERNAL MEDICINE

## 2019-08-11 PROCEDURE — 86645 CMV ANTIBODY IGM: CPT | Performed by: INTERNAL MEDICINE

## 2019-08-11 PROCEDURE — 87798 DETECT AGENT NOS DNA AMP: CPT | Performed by: INTERNAL MEDICINE

## 2019-08-11 PROCEDURE — 99233 SBSQ HOSP IP/OBS HIGH 50: CPT | Performed by: INTERNAL MEDICINE

## 2019-08-11 PROCEDURE — 80048 BASIC METABOLIC PNL TOTAL CA: CPT | Performed by: INTERNAL MEDICINE

## 2019-08-11 PROCEDURE — 80076 HEPATIC FUNCTION PANEL: CPT | Performed by: INTERNAL MEDICINE

## 2019-08-11 PROCEDURE — 25000132 ZZH RX MED GY IP 250 OP 250 PS 637: Mod: GY | Performed by: INTERNAL MEDICINE

## 2019-08-11 PROCEDURE — 25000128 H RX IP 250 OP 636: Performed by: INTERNAL MEDICINE

## 2019-08-11 PROCEDURE — 36415 COLL VENOUS BLD VENIPUNCTURE: CPT | Performed by: INTERNAL MEDICINE

## 2019-08-11 RX ORDER — GUAIFENESIN 600 MG/1
600 TABLET, EXTENDED RELEASE ORAL 2 TIMES DAILY
Status: DISCONTINUED | OUTPATIENT
Start: 2019-08-11 | End: 2019-08-13 | Stop reason: HOSPADM

## 2019-08-11 RX ORDER — SACCHAROMYCES BOULARDII 250 MG
250 CAPSULE ORAL 2 TIMES DAILY
Status: DISCONTINUED | OUTPATIENT
Start: 2019-08-11 | End: 2019-08-13 | Stop reason: HOSPADM

## 2019-08-11 RX ORDER — ASCORBIC ACID 500 MG
500 TABLET ORAL DAILY
COMMUNITY
End: 2019-10-29

## 2019-08-11 RX ORDER — CITALOPRAM HYDROBROMIDE 20 MG/1
20 TABLET ORAL DAILY
Status: DISCONTINUED | OUTPATIENT
Start: 2019-08-11 | End: 2019-08-13 | Stop reason: HOSPADM

## 2019-08-11 RX ORDER — FERROUS FUMARATE 324(106)MG
324 TABLET ORAL DAILY
Status: DISCONTINUED | OUTPATIENT
Start: 2019-08-12 | End: 2019-08-13 | Stop reason: HOSPADM

## 2019-08-11 RX ORDER — FERROUS FUMARATE 324(106)MG
324 TABLET ORAL DAILY
COMMUNITY
End: 2019-10-29

## 2019-08-11 RX ORDER — ASPIRIN 81 MG/1
81 TABLET ORAL DAILY
Status: DISCONTINUED | OUTPATIENT
Start: 2019-08-12 | End: 2019-08-13 | Stop reason: HOSPADM

## 2019-08-11 RX ORDER — LOPERAMIDE HCL 2 MG
2 CAPSULE ORAL 4 TIMES DAILY
Status: DISCONTINUED | OUTPATIENT
Start: 2019-08-12 | End: 2019-08-13 | Stop reason: HOSPADM

## 2019-08-11 RX ORDER — ASPIRIN 81 MG/1
81 TABLET ORAL DAILY
Status: ON HOLD | COMMUNITY
End: 2022-08-18

## 2019-08-11 RX ADMIN — GABAPENTIN 900 MG: 300 CAPSULE ORAL at 12:36

## 2019-08-11 RX ADMIN — AZITHROMYCIN 500 MG: 500 INJECTION, POWDER, LYOPHILIZED, FOR SOLUTION INTRAVENOUS at 14:57

## 2019-08-11 RX ADMIN — OXYCODONE HYDROCHLORIDE 20 MG: 20 TABLET, FILM COATED, EXTENDED RELEASE ORAL at 06:46

## 2019-08-11 RX ADMIN — GABAPENTIN 600 MG: 600 TABLET, FILM COATED ORAL at 06:46

## 2019-08-11 RX ADMIN — OXYCODONE HYDROCHLORIDE 10 MG: 10 TABLET, FILM COATED, EXTENDED RELEASE ORAL at 12:36

## 2019-08-11 RX ADMIN — LOPERAMIDE HYDROCHLORIDE 2 MG: 2 CAPSULE ORAL at 06:46

## 2019-08-11 RX ADMIN — TAZOBACTAM SODIUM AND PIPERACILLIN SODIUM 3.38 G: 375; 3 INJECTION, SOLUTION INTRAVENOUS at 14:22

## 2019-08-11 RX ADMIN — SODIUM CHLORIDE, PRESERVATIVE FREE: 5 INJECTION INTRAVENOUS at 10:59

## 2019-08-11 RX ADMIN — SODIUM CHLORIDE, PRESERVATIVE FREE: 5 INJECTION INTRAVENOUS at 02:30

## 2019-08-11 RX ADMIN — ACYCLOVIR 400 MG: 400 TABLET ORAL at 06:45

## 2019-08-11 RX ADMIN — TAZOBACTAM SODIUM AND PIPERACILLIN SODIUM 3.38 G: 375; 3 INJECTION, SOLUTION INTRAVENOUS at 19:16

## 2019-08-11 RX ADMIN — GABAPENTIN 900 MG: 300 CAPSULE ORAL at 19:07

## 2019-08-11 RX ADMIN — TAZOBACTAM SODIUM AND PIPERACILLIN SODIUM 3.38 G: 375; 3 INJECTION, SOLUTION INTRAVENOUS at 02:30

## 2019-08-11 RX ADMIN — LOPERAMIDE HYDROCHLORIDE 2 MG: 2 CAPSULE ORAL at 19:07

## 2019-08-11 RX ADMIN — OXYCODONE HYDROCHLORIDE 5 MG: 5 TABLET ORAL at 06:45

## 2019-08-11 RX ADMIN — OXYCODONE HYDROCHLORIDE 10 MG: 10 TABLET, FILM COATED, EXTENDED RELEASE ORAL at 19:07

## 2019-08-11 RX ADMIN — AMITRIPTYLINE HYDROCHLORIDE 10 MG: 10 TABLET, FILM COATED ORAL at 19:13

## 2019-08-11 RX ADMIN — FUROSEMIDE 20 MG: 20 TABLET ORAL at 06:46

## 2019-08-11 RX ADMIN — TAMSULOSIN HYDROCHLORIDE 0.4 MG: 0.4 CAPSULE ORAL at 19:07

## 2019-08-11 RX ADMIN — TAZOBACTAM SODIUM AND PIPERACILLIN SODIUM 3.38 G: 375; 3 INJECTION, SOLUTION INTRAVENOUS at 08:36

## 2019-08-11 RX ADMIN — POTASSIUM CHLORIDE 20 MEQ: 1500 TABLET, EXTENDED RELEASE ORAL at 06:45

## 2019-08-11 RX ADMIN — GUAIFENESIN 600 MG: 600 TABLET, EXTENDED RELEASE ORAL at 09:19

## 2019-08-11 RX ADMIN — LOPERAMIDE HYDROCHLORIDE 2 MG: 2 CAPSULE ORAL at 12:36

## 2019-08-11 RX ADMIN — OXYCODONE HYDROCHLORIDE 5 MG: 5 TABLET ORAL at 12:37

## 2019-08-11 RX ADMIN — CITALOPRAM HYDROBROMIDE 20 MG: 20 TABLET ORAL at 09:19

## 2019-08-11 RX ADMIN — RDII 250 MG CAPSULE 250 MG: at 22:12

## 2019-08-11 RX ADMIN — GUAIFENESIN 600 MG: 600 TABLET, EXTENDED RELEASE ORAL at 22:12

## 2019-08-11 RX ADMIN — ACYCLOVIR 400 MG: 400 TABLET ORAL at 19:13

## 2019-08-11 RX ADMIN — OXYCODONE HYDROCHLORIDE 5 MG: 5 TABLET ORAL at 19:07

## 2019-08-11 RX ADMIN — OMEPRAZOLE 40 MG: 20 CAPSULE, DELAYED RELEASE ORAL at 06:46

## 2019-08-11 RX ADMIN — SODIUM CHLORIDE, PRESERVATIVE FREE: 5 INJECTION INTRAVENOUS at 22:14

## 2019-08-11 ASSESSMENT — ACTIVITIES OF DAILY LIVING (ADL)
ADLS_ACUITY_SCORE: 14

## 2019-08-11 NOTE — PROGRESS NOTES
Assessment completed see flowsheet.    LOC: alert and oriented, very pleasant and conversational  Others present: Patient alone    Dx: pna  Chronic Disease Management: MM, HTN, CKD, Chr back pain    Lives with: with his wife Amanda  Living at:  Home in [place]  Transportation: YES He and Amanda both drive; he says he does most of the driving because she doesn't like to drive any amount of distance. He said she is angry with him that he chose to come to the [facility] because she is afraid to drive this far.     Primary PCP: Ramiro Olivares  Insurance:  Medicare and Blue Cross Blue Shield  Medicare IM letter reviewed with him this morning.    Support System:  Amanda and their adult kids; he enjoys the area where their town-home is and feels well supported by the people there.   Homecare/PCA: none  /County Services:   none  [place]: NO      [facility] Referral line called: na    Health Care Directive: NO but he is receptive of the information  Guardian: no  POA: no; says they have talked about it and have information at home    Pharmacy: [facility] in [place]  Meds management: YES feels he has been managing his meds well at home, says he remembers to take them the way they are prescribed, he gets them refilled, and he generally understands what they are for    Adequate Resources for needs (housing, utilities, food/med): YES  Household chores: shared with Amanda  Work/community/social activity: YES as desired; 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 Says he likes to keep busy.     ADLs: independent  Ambulation:independent with a walking stick  Falls: fell about a month ago after tripping on a mat. Amanda threw the mat away that minute and Aden denies any other tripping hazards in the home.   Nutrition: they both shop and prep meals. No special diet followed and no  dietary concerns voiced.   Sleep: sleeps well in a bed at night    Equipment used: none      Oxygen supplier: na      Does the supplier have valid oxygen orders: na    Mental health: has been feeling down lately and talked to the hospitalist about starting an anti-depressant. He became tearful as he was talking about the shift in his multiple myeloma and its treatment. He would like to find a support group specific to his type of cancer (as opposed to broader cancer support groups). He does not want to work with a counselor.   Substance abuse: no substance use  Exposure to violence/abuse: denies  Stressors: none additional voiced    Able to Return to Prior Living Arrangements: YES    Choice of Vendor: na    Barriers: none known    EARL: average    Plan: he plans to return home with his wife; no additional supports expected.

## 2019-08-11 NOTE — PLAN OF CARE
This nurse assumed care from BRANDO RN At 0220 am.   Reason for hospital stay:  Patient is admitted for RLL pneumonia.     Most recent vitals: BP (!) 109/45   Pulse 104   Temp 97.3  F (36.3  C) (Tympanic)   Resp 18   SpO2 93%     Pain Management:  Patient denied pain during this shift.     Orientation:  A&O x 4.     Cardiac: AP regular. Bp slightly soft at 109/45. Pt. Does have dependent edema and some generalized edema noted.     Respiratory: Lung sounds clear in all lobes except RLL which has fine crackles. This is an improvement from admission when Rales and coarse crackles heard.     GI: BS audible and normoactive.     : WDL    Nutrition: Regular Diet- Lactose Intolerant    Skin Issues: WDL See PCS for skin assessment.     IVF: NS at 100 ml/hr    ABX: Zosyn IV q 6 hrs. Azithromycin every day IV.      Mobility: SBA with cane for long distance but no device for short distance.     Safety: Bed is in low position, brakes on, call light in reach, bed alarm on.     Comments: Patient is in good spirits. He slept well during the night.       8/11/2019  5:37 AM  Linda Chávez

## 2019-08-11 NOTE — PLAN OF CARE
VSS, afebrile, HRR, lungs have coarse crackles on the right and clear on the left. Pt remains on 1L O2, O2 sat 92-93%. Bowels are active. Pt is up with with a stand by assist, alarms have been on to remind the pt that we need to be present when he gets up due to his Oxygen tubing and IV cords. Pt is alert and oriented x4, makes his needs known, calls appropriately as long as the alarm is on. Voiding well. Denies pain.    Face to face report given with opportunity to observe patient.    Report given to BAILEY Rocha   8/11/2019  3:57 PM

## 2019-08-11 NOTE — PLAN OF CARE
[facility] Inpatient Admission Note:    Patient admitted to 3230/3230-1 at approximately 1435 via cart accompanied by spouse from emergency room . Report received from BAILEY Alas in SBAR format at 1410 via telephone. Patient ambulated to bed via self.. Patient is alert and oriented X 3, denies pain; rates at 0 on 0-10 scale.  Patient oriented to room, unit, hourly rounding, and plan of care. Explained admission packet and patient handbook with patient bill of rights brochure. Will continue to monitor and document as needed.     Inpatient Nursing criteria listed below was met:    Health care directives status obtained and documented: Yes    Care Everywhere authorization obtained Yes    MRSA swab completed for patient 65 years and older: Yes    Patient identifies a surrogate decision maker: Yes If yes, who:Elain - Wife Contact Information:[phone]    Core Measure diagnosis present: No   If initial lactic acid >2.0, repeat lactic acid drawn within one hour of arrival to unit: NA.    Vaccination assessment and education completed: No     Clergy visit ordered if patient requests: N/A    Skin issues/needs documented: N/A    Isolation Patient: no    Fall Prevention Yes: Care plan updated, education given and documented, sticker and magnet in place: Yes    Care Plan initiated: Yes    Education Documented (including assessment): Yes    Patient has discharge needs : No

## 2019-08-11 NOTE — PLAN OF CARE
Face to face report given with opportunity to observe patient.  Report given to BAILEY Molina.    Linda Chávez  8/11/2019, 7:18 AM

## 2019-08-11 NOTE — PROGRESS NOTES
Patient has slept since report received from evening shift. No respiratory difficulty noted.    Face to face report given with opportunity to observe patient.    Report given to Chantal Salvador   8/11/2019  2:41 AM

## 2019-08-11 NOTE — PLAN OF CARE
VSS, afebrile, HRR, RLL coarse crackles, otherwise clear, pt on 1L O2 via NC, O2 sat 95%, bowels active. Pt is up with a SBA, calls appropriately, however he does like to do things himself so his bed alarm is on to remind him to call. Pt receiving IV fluids 100 mL/hr NS, IV Zosyn, and IV Zithromax. Pt is alert and oriented x 4. Pt does have 3+ edema in bilateral lower legs.     Face to face report given with opportunity to observe patient.    Report given to BAILEY Rocha   8/10/2019  11:22 PM

## 2019-08-12 LAB
ANION GAP SERPL CALCULATED.3IONS-SCNC: 3 MMOL/L (ref 3–14)
BACTERIA SPEC CULT: NORMAL
BASOPHILS # BLD AUTO: 0 10E9/L (ref 0–0.2)
BASOPHILS NFR BLD AUTO: 0.3 %
BUN SERPL-MCNC: 26 MG/DL (ref 7–30)
CALCIUM SERPL-MCNC: 7.7 MG/DL (ref 8.5–10.1)
CHLORIDE SERPL-SCNC: 111 MMOL/L (ref 94–109)
CO2 SERPL-SCNC: 26 MMOL/L (ref 20–32)
CREAT SERPL-MCNC: 1.65 MG/DL (ref 0.66–1.25)
DIFFERENTIAL METHOD BLD: ABNORMAL
EOSINOPHIL # BLD AUTO: 0.2 10E9/L (ref 0–0.7)
EOSINOPHIL NFR BLD AUTO: 2.1 %
ERYTHROCYTE [DISTWIDTH] IN BLOOD BY AUTOMATED COUNT: 14.9 % (ref 10–15)
GFR SERPL CREATININE-BSD FRML MDRD: 39 ML/MIN/{1.73_M2}
GLUCOSE SERPL-MCNC: 80 MG/DL (ref 70–99)
HCT VFR BLD AUTO: 28.6 % (ref 40–53)
HGB BLD-MCNC: 9.8 G/DL (ref 13.3–17.7)
IMM GRANULOCYTES # BLD: 0 10E9/L (ref 0–0.4)
IMM GRANULOCYTES NFR BLD: 0.3 %
LYMPHOCYTES # BLD AUTO: 1.6 10E9/L (ref 0.8–5.3)
LYMPHOCYTES NFR BLD AUTO: 20.9 %
MCH RBC QN AUTO: 35 PG (ref 26.5–33)
MCHC RBC AUTO-ENTMCNC: 34.3 G/DL (ref 31.5–36.5)
MCV RBC AUTO: 102 FL (ref 78–100)
MONOCYTES # BLD AUTO: 0.9 10E9/L (ref 0–1.3)
MONOCYTES NFR BLD AUTO: 12 %
NEUTROPHILS # BLD AUTO: 4.8 10E9/L (ref 1.6–8.3)
NEUTROPHILS NFR BLD AUTO: 64.4 %
NRBC # BLD AUTO: 0 10*3/UL
NRBC BLD AUTO-RTO: 0 /100
PLATELET # BLD AUTO: 67 10E9/L (ref 150–450)
POTASSIUM SERPL-SCNC: 4.3 MMOL/L (ref 3.4–5.3)
RBC # BLD AUTO: 2.8 10E12/L (ref 4.4–5.9)
SODIUM SERPL-SCNC: 140 MMOL/L (ref 133–144)
SPECIMEN SOURCE: NORMAL
WBC # BLD AUTO: 7.5 10E9/L (ref 4–11)

## 2019-08-12 PROCEDURE — 25000132 ZZH RX MED GY IP 250 OP 250 PS 637: Mod: GY | Performed by: INTERNAL MEDICINE

## 2019-08-12 PROCEDURE — 12000000 ZZH R&B MED SURG/OB

## 2019-08-12 PROCEDURE — 36415 COLL VENOUS BLD VENIPUNCTURE: CPT | Performed by: INTERNAL MEDICINE

## 2019-08-12 PROCEDURE — 85025 COMPLETE CBC W/AUTO DIFF WBC: CPT | Performed by: INTERNAL MEDICINE

## 2019-08-12 PROCEDURE — 25800030 ZZH RX IP 258 OP 636: Performed by: INTERNAL MEDICINE

## 2019-08-12 PROCEDURE — 25000128 H RX IP 250 OP 636: Performed by: INTERNAL MEDICINE

## 2019-08-12 PROCEDURE — 99232 SBSQ HOSP IP/OBS MODERATE 35: CPT | Performed by: INTERNAL MEDICINE

## 2019-08-12 PROCEDURE — 80048 BASIC METABOLIC PNL TOTAL CA: CPT | Performed by: INTERNAL MEDICINE

## 2019-08-12 RX ORDER — LEVOFLOXACIN 500 MG/1
500 TABLET, FILM COATED ORAL DAILY
Status: DISCONTINUED | OUTPATIENT
Start: 2019-08-12 | End: 2019-08-13 | Stop reason: HOSPADM

## 2019-08-12 RX ORDER — LISINOPRIL 5 MG/1
5 TABLET ORAL DAILY
COMMUNITY
End: 2022-08-17

## 2019-08-12 RX ORDER — DILTIAZEM HYDROCHLORIDE 120 MG/1
120 CAPSULE, EXTENDED RELEASE ORAL DAILY
COMMUNITY
End: 2024-03-06

## 2019-08-12 RX ADMIN — ACYCLOVIR 400 MG: 400 TABLET ORAL at 19:19

## 2019-08-12 RX ADMIN — GABAPENTIN 900 MG: 300 CAPSULE ORAL at 12:58

## 2019-08-12 RX ADMIN — GABAPENTIN 900 MG: 300 CAPSULE ORAL at 19:19

## 2019-08-12 RX ADMIN — OMEPRAZOLE 40 MG: 20 CAPSULE, DELAYED RELEASE ORAL at 07:05

## 2019-08-12 RX ADMIN — RDII 250 MG CAPSULE 250 MG: at 19:19

## 2019-08-12 RX ADMIN — OXYCODONE HYDROCHLORIDE 20 MG: 20 TABLET, FILM COATED, EXTENDED RELEASE ORAL at 07:04

## 2019-08-12 RX ADMIN — ASPIRIN 81 MG: 81 TABLET, COATED ORAL at 07:05

## 2019-08-12 RX ADMIN — ACYCLOVIR 400 MG: 400 TABLET ORAL at 07:04

## 2019-08-12 RX ADMIN — CITALOPRAM HYDROBROMIDE 20 MG: 20 TABLET ORAL at 09:19

## 2019-08-12 RX ADMIN — TAZOBACTAM SODIUM AND PIPERACILLIN SODIUM 3.38 G: 375; 3 INJECTION, SOLUTION INTRAVENOUS at 02:13

## 2019-08-12 RX ADMIN — LOPERAMIDE HYDROCHLORIDE 2 MG: 2 CAPSULE ORAL at 12:59

## 2019-08-12 RX ADMIN — GABAPENTIN 600 MG: 600 TABLET, FILM COATED ORAL at 07:04

## 2019-08-12 RX ADMIN — OXYCODONE HYDROCHLORIDE 10 MG: 10 TABLET, FILM COATED, EXTENDED RELEASE ORAL at 19:20

## 2019-08-12 RX ADMIN — OXYCODONE HYDROCHLORIDE 5 MG: 5 TABLET ORAL at 07:05

## 2019-08-12 RX ADMIN — GUAIFENESIN 600 MG: 600 TABLET, EXTENDED RELEASE ORAL at 09:19

## 2019-08-12 RX ADMIN — GUAIFENESIN 600 MG: 600 TABLET, EXTENDED RELEASE ORAL at 19:20

## 2019-08-12 RX ADMIN — TAZOBACTAM SODIUM AND PIPERACILLIN SODIUM 3.38 G: 375; 3 INJECTION, SOLUTION INTRAVENOUS at 07:54

## 2019-08-12 RX ADMIN — RDII 250 MG CAPSULE 250 MG: at 09:19

## 2019-08-12 RX ADMIN — OXYCODONE HYDROCHLORIDE 10 MG: 10 TABLET, FILM COATED, EXTENDED RELEASE ORAL at 12:58

## 2019-08-12 RX ADMIN — TAMSULOSIN HYDROCHLORIDE 0.4 MG: 0.4 CAPSULE ORAL at 19:20

## 2019-08-12 RX ADMIN — OXYCODONE HYDROCHLORIDE 5 MG: 5 TABLET ORAL at 19:20

## 2019-08-12 RX ADMIN — SODIUM CHLORIDE, PRESERVATIVE FREE: 5 INJECTION INTRAVENOUS at 19:24

## 2019-08-12 RX ADMIN — OXYCODONE HYDROCHLORIDE 5 MG: 5 TABLET ORAL at 17:43

## 2019-08-12 RX ADMIN — LEVOFLOXACIN 500 MG: 500 TABLET, FILM COATED ORAL at 10:47

## 2019-08-12 RX ADMIN — LOPERAMIDE HYDROCHLORIDE 2 MG: 2 CAPSULE ORAL at 19:19

## 2019-08-12 RX ADMIN — OXYCODONE HYDROCHLORIDE 5 MG: 5 TABLET ORAL at 12:59

## 2019-08-12 RX ADMIN — AMITRIPTYLINE HYDROCHLORIDE 10 MG: 10 TABLET, FILM COATED ORAL at 19:19

## 2019-08-12 RX ADMIN — LOPERAMIDE HYDROCHLORIDE 2 MG: 2 CAPSULE ORAL at 07:04

## 2019-08-12 RX ADMIN — SODIUM CHLORIDE, PRESERVATIVE FREE: 5 INJECTION INTRAVENOUS at 09:19

## 2019-08-12 RX ADMIN — LOPERAMIDE HYDROCHLORIDE 2 MG: 2 CAPSULE ORAL at 00:44

## 2019-08-12 RX ADMIN — POTASSIUM CHLORIDE 20 MEQ: 1500 TABLET, EXTENDED RELEASE ORAL at 07:05

## 2019-08-12 ASSESSMENT — ACTIVITIES OF DAILY LIVING (ADL)
ADLS_ACUITY_SCORE: 14

## 2019-08-12 NOTE — PROGRESS NOTES
[facility]    Hospitalist Progress Note      Assessment & Plan   Aden Rosa is a 79 year old male who was admitted on 8/10/2019.          1.  Acute hypoxic respiratory failure secondary to community-acquired pneumonia: At this point patient is now back on room air.  Still somewhat of a loose cough but better.  No fevers.  White count is normal.  Did have one sputum sample did have gram-positive cocci and some gram-negative rods noted on smear the culture is still pending.  Has been on azithromycin and Zosyn since admission.  He actually looks significantly better.  We will put him on oral Levaquin today monitoring for 24 hours if stable discharge home tomorrow.    2.  Multiple myeloma: Diagnosis back in 2010.  He underwent a autologous stem cell transplant 2011.  He has been on ixazomib.  Followed by oncology at [facility].  Cell counts are fine.  Hemoglobin actually is better today.  Platelet count has been on the lower side.  No bleeding.    3.  Acute kidney injury on chronic kidney disease stage III.  Came with a creatinine of 2.0.  Probably was somewhat on the volume depleted side with IV fluid he is come up to 26 and 1.65 respectively baseline is around 1.3.  He is eating much better at this time.    4.  Elevated transaminases: Both came down shortly after admission.  Not sure if it really was due to his Ixazomib or not but that was on hold.  Restart this after discharge.    5.  Chronic back pain: Nothing new about it per his report.  Chronically on OxyContin and oxycodone.    6.  Peripheral neuropathy chronic no acute changes continue with his chronic medications.    7.  Depression: Was started on Celexa yesterday by Dr. Iniguez.  We will continue this as an outpatient.        Diet: Advance Diet as Tolerated: Regular Diet Adult; No Lactose Diet  Fluids: IV lock    DVT Prophylaxis: Pneumatic Compression Devices  Code Status: DNR/DNI    Disposition: Expected discharge in 1 days once tolerates oral  antibiotics today.    [facility]    Interval History   Patient feels actually markedly better.  Still some congestion.  Was a little sore anteriorly with coughing.  Some loose stools is getting some Imodium.  No fevers white counts fine O2 sats are stable on room air overall is better decided to put him on some oral Levaquin see if he tolerates it today and discharge tomorrow.    -Data reviewed today: I reviewed all new labs and imaging results over the last 24 hours. I personally reviewed no images or EKG's today.    Physical Exam   Temp: 97.3  F (36.3  C) Temp src: Tympanic BP: 165/61   Heart Rate: 74 Resp: 20 SpO2: 92 % O2 Device: None (Room air) Oxygen Delivery: 1/2 LPM  There were no vitals filed for this visit.  Vital Signs with Ranges  Temp:  [96.7  F (35.9  C)-97.4  F (36.3  C)] 97.3  F (36.3  C)  Heart Rate:  [70-79] 74  Resp:  [16-20] 20  BP: (148-176)/(56-82) 165/61  SpO2:  [92 %-96 %] 92 %  I/O last 3 completed shifts:  In: 3811 [P.O.:1080; I.V.:2731]  Out: 3300 [Urine:3300]    Peripheral IV 08/11/19 Right Lower forearm (Active)   Site Assessment WDL 8/12/2019  7:50 AM   Line Status Infusing 8/12/2019  7:50 AM   Phlebitis Scale 0-->no symptoms 8/12/2019  7:50 AM   Infiltration Scale 0 8/12/2019  7:50 AM   Extravasation? No 8/12/2019 12:35 AM   Number of days: 1     No line/device    Constitutional: Alert and oriented x3. No distress    HEENT: Normocephalic/atraumatic, PERRL, EOMI, mouth Moist, neck supple, no LN.     Cardiovascular: RRR. no Murmur, no  rubs, or gallops.  JVD no.  Bruits no.  Pulses 2+    Respiratory:Scattered rhonchi on right Clear to auscultation bilaterally    Abdomen: Soft, nontender, nondistended, no organomegaly. Bowel sounds present    Extremities: Warm/dry. No edema    Neuro:   Non focal, cranial nerves intact, Moves all extremities.  Medications     sodium chloride 100 mL/hr at 08/12/19 0919       sodium chloride 0.9%  500 mL Intravenous Once     acyclovir  400 mg Oral BID      amitriptyline  10 mg Oral QPM     aspirin  81 mg Oral Daily     citalopram  20 mg Oral Daily     ferrous fumarate  324 mg Oral Daily     gabapentin  900 mg Oral Daily     gabapentin  900 mg Oral Daily     gabapentin  600 mg Oral Daily     guaiFENesin  600 mg Oral BID     levofloxacin  500 mg Oral Daily     loperamide  2 mg Oral 4x Daily     omeprazole  40 mg Oral Daily     oxyCODONE  10 mg Oral Daily     oxyCODONE  10 mg Oral Daily     oxyCODONE  20 mg Oral Daily     oxyCODONE  5 mg Oral TID     potassium chloride ER  20 mEq Oral Daily     saccharomyces boulardii  250 mg Oral BID     sodium chloride (PF)  3 mL Intracatheter Q8H     tamsulosin  0.4 mg Oral QPM       Data   Recent Labs   Lab 08/12/19  0524 08/11/19  0530 08/10/19  1205   WBC 7.5 10.0 9.1   HGB 9.8* 8.8* 11.0*   * 104* 101*   PLT 67* 65* 91*    141 138   POTASSIUM 4.3 4.4 4.9   CHLORIDE 111* 110* 107   CO2 26 28 25   BUN 26 40* 40*   CR 1.65* 1.87* 2.04*   ANIONGAP 3 3 6   OLAYINKA 7.7* 7.6* 8.6   GLC 80 88 117*   ALBUMIN  --  2.4* 3.3*   PROTTOTAL  --  6.0* 7.3   BILITOTAL  --  1.0 1.2   ALKPHOS  --  71 102   ALT  --  74* 112*   AST  --  68* 142*       No results found for this or any previous visit (from the past 24 hour(s)).

## 2019-08-12 NOTE — PROVIDER NOTIFICATION
MD updated RE: patient's c/o increased loose BM's, he thinks his newer chemo medication might be contributing and he is on IV antibiotics. Reviewed Imodium order. Order received to increase Imodium order to QID from TID and add Florastor 250 mg PO BID.

## 2019-08-12 NOTE — PLAN OF CARE
Face to face report given with opportunity to observe patient.  Report given to BAILEY Molina.    Linda Chávez  8/12/2019, 7:14 AM

## 2019-08-12 NOTE — PHARMACY
[facility]    Pharmacy      Antimicrobial Stewardship Note     Current antimicrobial therapy:  Anti-infectives (From now, onward)    Start     Dose/Rate Route Frequency Ordered Stop    08/12/19 1030  levofloxacin (LEVAQUIN) tablet 500 mg      500 mg Oral DAILY 08/12/19 1021      08/10/19 1900  acyclovir (ZOVIRAX) tablet 400 mg      400 mg Oral 2 TIMES DAILY 08/10/19 1643          Indication: CAP (Levaquin) and Herpes zoster prophylaxis (Acyclovir)    Days of Therapy: 1 (Levaquin) and 3 (Acyclovir)     Pertinent labs:  Creatinine   Creatinine   Date Value Ref Range Status   08/12/2019 1.65 (H) 0.66 - 1.25 mg/dL Final   08/11/2019 1.87 (H) 0.66 - 1.25 mg/dL Final   08/10/2019 2.04 (H) 0.66 - 1.25 mg/dL Final     WBC   WBC   Date Value Ref Range Status   08/12/2019 7.5 4.0 - 11.0 10e9/L Final   08/11/2019 10.0 4.0 - 11.0 10e9/L Final   08/10/2019 9.1 4.0 - 11.0 10e9/L Final     Procalcitonin   Procalcitonin   Date Value Ref Range Status   04/03/2019 0.50 ng/ml Final     Comment:     0.50-1.99 ng/ml  Moderate risk of systemic infection.  Recommendation:   Recommend antibiotics. Evaluate culture results and clinical features to   target antibacterial therapy. Obtain blood cultures and other relevant   cultures if not done.  If empiric antibiotics were started, recheck PCT in: 2 days to guide   antibiotic de-escalation.  Discontinue or de-escalate antibiotics when PCT   concentration is <80% of peak or abs PCT <0.5. If empiric antibiotics were NOT   started, recheck PCT in 6-24 hours to re-evaluate need for antibiotics.     03/31/2019 0.90 ng/ml Final     Comment:     0.50-1.99 ng/ml  Moderate risk of systemic infection.  Recommendation:   Recommend antibiotics. Evaluate culture results and clinical features to   target antibacterial therapy. Obtain blood cultures and other relevant   cultures if not done.  If empiric antibiotics were started, recheck PCT in: 2 days to guide   antibiotic de-escalation.   Discontinue or de-escalate antibiotics when PCT   concentration is <80% of peak or abs PCT <0.5. If empiric antibiotics were NOT   started, recheck PCT in 6-24 hours to re-evaluate need for antibiotics.       CRP   CRP Inflammation   Date Value Ref Range Status   08/10/2019 33.6 (H) 0.0 - 8.0 mg/L Final       Culture Results:   Procedure Component Value Units Date/Time   Active MRSA Surveillance Culture [[id number]] Collected: 08/10/19 2255   Order Status: Completed Lab Status: Final result Updated: 08/12/19 0718   Specimen: Nares from Nose     Specimen Description Nares    Culture Micro No MRSA isolated   Sputum Culture Aerobic Bacterial [[id number]] Collected: 08/10/19 2215   Order Status: Completed Lab Status: Preliminary result Updated: 08/11/19 0718   Specimen: Sputum     Specimen Description Sputum    Culture Micro Culture in progress   Gram stain [[id number]] (Abnormal) Collected: 08/10/19 2215   Order Status: Completed Lab Status: Final result Updated: 08/11/19 1027   Specimen: Sputum     Specimen Description Sputum    Gram Stain <10 Squamous epithelial cells/low power field     <25 PMNs/low power field     Few   Gram positive cocci   Abnormal      Few   Gram positive rods   Abnormal    Blood culture [[id number]] Collected: 08/10/19 1340   Order Status: Completed Lab Status: Preliminary result Updated: 08/12/19 1331   Specimen: Blood     Specimen Description Blood    Special Requests Right Arm    Culture Micro No growth after 2 days   Blood culture    Order Status: Canceled Lab Status: No result    Specimen: Blood    Blood culture    Order Status: Canceled Lab Status: No result    Specimen: Blood    Blood culture [[id number]] Collected: 08/10/19 1205   Order Status: Completed Lab Status: Preliminary result Updated: 08/12/19 1331   Specimen: Left Arm     Specimen Description Left Arm    Culture Micro No growth after 2 days       Recommendations/Interventions:  1. No recommendations at this time. Will continue to monitor.      Lalita Berumen, LTAC, located within [facility]  August 12, 2019

## 2019-08-12 NOTE — PLAN OF CARE
Face to face report given with opportunity to observe patient.    Report given to BAILEY Rocha   8/12/2019  3:11 PM

## 2019-08-12 NOTE — PROGRESS NOTES
[facility]    Hospitalist Progress Note      Assessment & Plan    Aden Rosa is a 79 year old male with hx multiple myeloma s/p autologous stem cell transplant 2011, HTN, and chronic back pain who presents with weakness, fatigue and low O2 sats at home. He is admitted for acute hypoxic respiratory failure due to community acquired pneumonia.      Acute hypoxic respiratory failure  Presumed secondary to community acquired pneumonia  He is at risk for gram negative infection as well as drug resistant organisms due to multiple abx courses in past 6 months and immunosuppression, so will continue him empirically on Zosyn and Azithromycin. Will work to wean oxygen as able.  Still on 1 liter today and would like to see if we could get him off.  On discharge, might consider Levofloxacin given he was on Augmentin in April. He has more of a cough today with some sputum production at one point. Cannot rule out possibility of non-bacterial cause for pneumonia or possibly even a more chronic infectious or even non-infectious cause given this is his fourth bout in the the last 6 months. With his mild transaminitis, sent studies for EBV and CMV. AST/ALT nearly normalized today. Holding his Ixazomib.      Multiple myeloma  Follows with Dr. Jennyfer Bustillo at [facility]. Has IgA kappa multiple myeloma diagnosed 7/2010. Treated with 4 cycles Revlimid and 2 cycles Velcade/Decadron before proceeding to autologous stem cell transplant on 3/17/11 at [facility]. Was on maintenance Revlimid. Transitioned to Ixazomib due to worsening paraproteinemia. He is at reduced dose now due to worsening paresthesias. He has multiple known stable lytic lesions involving the bilateral humeri and also calvarial lesions. Repeat skeletal survey is planned for 9/2019. He was on Zometa infusions, but they were discontinued after he developed left lower jaw necrosis.     Acute kidney injury on chronic kidney disease, stage 3  Baseline creatinine  of 1.3-1.5 and his chronic kidney disease likely related to multiple myeloma involvement. Cr up to 2 on admission. Improving today at 1.87.   Felt he may be a little dry, maybe some prerenal azotemia. Will continue with modest IVF and monitor renal function     Transaminitis  His ALT and AST were mildly elevated. They were normal on 7/18/19 per [facility]'s record. Sent off CMV and EBV given he is on immunosuppressants. Could be hepatotoxicity related to his Ixazomib. AST/ALT trending down today, near normalized     Macrocytic anemia  Thrombocytopenia  Likely due to multiple myeloma and chemotherapy. Monitor     Hypertension  BP stable. If creeping up, will resume his Diltiazem. Holding lisinopril too due to prerenal azotemia.      Peripheral neuropathy  Continue with Neurontin, Amitriptyline, Oxycontin, Oxycodone     Chronic low back pain:  Related to spinal stenosis. Last MRI was 8/2011. He is s/p prior L2-3 hemilaminectomy over 10 years ago withpartial facetectomy. Continue with his Oxycontin and Oxycodone    Depression  He is tearful today. Admits to feeling very depressed and asks if there if a medication he can try. Discussed support groups. Has tried in the past, but limited availability in his area. Will have case management see if there are any other groups in his area.  Start trial of Celexa 20 mg PO daily. He will need outpatient follow up     DVT Prophylaxis: Pneumatic Compression Devices  Code Status: DNR / DNI     Disposition: Expected discharge in 1-2 days if remains afebrile, cardiopulmonary status stable, able to convert to oral antibiotics and working to see if we can wean him off the 1 Liter of oxygen    London Iniguez    Interval History   Feeling better overall. Has developed more of a loose cough today. No fevers overnight. He is tearful and admits to being depressed. Denies suicidal ideation. Inquires about starting something for his depression    -Data reviewed today: I reviewed all new labs  and imaging results over the last 24 hours. I personally reviewed no images or EKG's today.    Physical Exam   Temp: 97.4  F (36.3  C) Temp src: Tympanic BP: 150/56   Heart Rate: 79 Resp: 20 SpO2: 95 % O2 Device: Nasal cannula Oxygen Delivery: 1/2 LPM  There were no vitals filed for this visit.  Vital Signs with Ranges  Temp:  [96.7  F (35.9  C)-97.4  F (36.3  C)] 97.4  F (36.3  C)  Heart Rate:  [63-79] 79  Resp:  [16-20] 20  BP: (109-164)/(45-61) 150/56  SpO2:  [92 %-95 %] 95 %  I/O last 3 completed shifts:  In: 3386 [P.O.:1080; I.V.:2306]  Out: 2150 [Urine:2150]    Peripheral IV 08/11/19 Right Lower forearm (Active)   Site Assessment WDL 8/11/2019  6:50 PM   Line Status Infusing;Checked every 1-2 hour 8/11/2019  6:50 PM   Phlebitis Scale 0-->no symptoms 8/11/2019  6:50 PM   Infiltration Scale 0 8/11/2019  6:50 PM   Number of days: 0     Line/device assessment(s) completed for medical necessity    Constitutional: Alert and oriented x3. No distress    HEENT: Normocephalic/atraumatic, PERRL, EOMI, mouth clear, neck supple, no LN.     Cardiovascular: RRR. NO murmur, no  rubs, or gallops.      Respiratory: Right side with crackles/ronchi and more diminished breath sounds. Left side clear    Abdomen: Soft, nontender, nondistended, no organomegaly. Bowel sounds present    Extremities: Warm/dry. No edema    Neuro:  Non focal, cranial nerves intact, Moves all extremities.  Medications     sodium chloride 100 mL/hr at 08/11/19 2214       sodium chloride 0.9%  500 mL Intravenous Once     acyclovir  400 mg Oral BID     amitriptyline  10 mg Oral QPM     [START ON 8/12/2019] aspirin  81 mg Oral Daily     azithromycin  500 mg Intravenous Q24H     citalopram  20 mg Oral Daily     [START ON 8/12/2019] ferrous fumarate  324 mg Oral Daily     gabapentin  900 mg Oral Daily     gabapentin  900 mg Oral Daily     gabapentin  600 mg Oral Daily     guaiFENesin  600 mg Oral BID     [START ON 8/12/2019] loperamide  2 mg Oral 4x Daily      omeprazole  40 mg Oral Daily     oxyCODONE  10 mg Oral Daily     oxyCODONE  10 mg Oral Daily     oxyCODONE  20 mg Oral Daily     oxyCODONE  5 mg Oral TID     piperacillin-tazobactam  3.375 g Intravenous Q6H     potassium chloride ER  20 mEq Oral Daily     saccharomyces boulardii  250 mg Oral BID     sodium chloride (PF)  3 mL Intracatheter Q8H     tamsulosin  0.4 mg Oral QPM       Data   Recent Labs   Lab 08/11/19  0530 08/10/19  1205   WBC 10.0 9.1   HGB 8.8* 11.0*   * 101*   PLT 65* 91*    138   POTASSIUM 4.4 4.9   CHLORIDE 110* 107   CO2 28 25   BUN 40* 40*   CR 1.87* 2.04*   ANIONGAP 3 6   OLAYINKA 7.6* 8.6   GLC 88 117*   ALBUMIN 2.4* 3.3*   PROTTOTAL 6.0* 7.3   BILITOTAL 1.0 1.2   ALKPHOS 71 102   ALT 74* 112*   AST 68* 142*       No results found for this or any previous visit (from the past 24 hour(s)).

## 2019-08-12 NOTE — PLAN OF CARE
Reason for hospital stay: Patient is admitted for Right sided pneumonia.     Most recent vitals: /59   Pulse 104   Temp 97.4  F (36.3  C) (Tympanic)   Resp 20   SpO2 94%     Pain Management: Patient denies pain on this shift.     Orientation:  Patient is alert and oriented x 4.     Cardiac: AP regular. Generalized edema +2.     Respiratory: Lung sounds have coarse crackles on the right side. Clear on the left. He is on 1/2 L of O2 and is running at approx. 94%    GI: WDL, Bowel sounds audible and active.     :  WDL    Nutrition: Regular Diet with lactose intolerance.     Skin Issues: See PCS for skin assessment.     IVF:  NS running at 100 ml/hr.     ABX:  Zosyn q 6 hrs IV, Azithromycin every day IV.     Mobility: SBA for safety with O2 cord and IV pole and tubing. He is steady on his feet.     Safety: Bed alarm is on, bed in low position, brakes on, call light in reach.     Comments: Patient is in good spirits, call light in reach.       8/12/2019  5:57 AM  Linda Chávez

## 2019-08-12 NOTE — PLAN OF CARE
"Patient has denied pain this shift. VSS, afebrile. O2 sat maintaining > 92% @ 0.5 LPM NC. Patient denies any respiratory difficulty. Rare nonproductive dry cough. Lung sounds had coarse crackles in the right side, clear on the left. Oral intake adequate. Urine output adequate. Patient reports having increased loose stools. MD was updated and Imodium order was increased to QID with Florastor PO BID added. Patient has laid in bed most of the shift, reporting that he is tired. He stated that he \"had too many visitors today,\" but declined keeping visitors away. Ambulating into the bathroom with stand by assist for safety. Alarms in place. Call light within reach. IVF and IV antibiotics continue.    Face to face report given with opportunity to observe patient.    Report given to Chantal Salvador   8/11/2019  11:28 PM      "

## 2019-08-12 NOTE — PLAN OF CARE
VSS, afebrile, HRR, lungs coarse on the right side, clear on the left. Pt has productive intermittent good cough. Pt has been weaned off of oxygen, o2 sat has been 92-96% on RA.  Pt has been complaining of slight jaw pain, MD aware. Bowels active, pt had 1 loose BM this morning. Pt has 3+ edema in his lower legs and feet. Pt is up stand by assist to the bathroom, bed alarm is on. Pt did go for a walk around the floor 1x. Pt is alert and oriented x 4, makes his needs known and calls appropriately.

## 2019-08-13 ENCOUNTER — APPOINTMENT (OUTPATIENT)
Dept: GENERAL RADIOLOGY | Facility: HOSPITAL | Age: 79
DRG: 193 | End: 2019-08-13
Attending: INTERNAL MEDICINE
Payer: MEDICARE

## 2019-08-13 VITALS
WEIGHT: 207.23 LBS | HEIGHT: 67 IN | SYSTOLIC BLOOD PRESSURE: 161 MMHG | TEMPERATURE: 97.3 F | DIASTOLIC BLOOD PRESSURE: 63 MMHG | OXYGEN SATURATION: 95 % | BODY MASS INDEX: 32.53 KG/M2 | HEART RATE: 104 BPM | RESPIRATION RATE: 18 BRPM

## 2019-08-13 DIAGNOSIS — F32.9 REACTIVE DEPRESSION: ICD-10-CM

## 2019-08-13 PROBLEM — N17.9 ACUTE RENAL FAILURE SUPERIMPOSED ON STAGE 3 CHRONIC KIDNEY DISEASE (H): Status: ACTIVE | Noted: 2019-08-13

## 2019-08-13 PROBLEM — N18.30 ACUTE RENAL FAILURE SUPERIMPOSED ON STAGE 3 CHRONIC KIDNEY DISEASE (H): Status: ACTIVE | Noted: 2019-08-13

## 2019-08-13 LAB
ANION GAP SERPL CALCULATED.3IONS-SCNC: 5 MMOL/L (ref 3–14)
BUN SERPL-MCNC: 17 MG/DL (ref 7–30)
CALCIUM SERPL-MCNC: 8 MG/DL (ref 8.5–10.1)
CHLORIDE SERPL-SCNC: 113 MMOL/L (ref 94–109)
CMV IGM SERPL QL IA: <0.2 AI (ref 0–0.8)
CO2 SERPL-SCNC: 24 MMOL/L (ref 20–32)
CREAT SERPL-MCNC: 1.41 MG/DL (ref 0.66–1.25)
ERYTHROCYTE [DISTWIDTH] IN BLOOD BY AUTOMATED COUNT: 14.5 % (ref 10–15)
GFR SERPL CREATININE-BSD FRML MDRD: 47 ML/MIN/{1.73_M2}
GLUCOSE SERPL-MCNC: 78 MG/DL (ref 70–99)
HCT VFR BLD AUTO: 29.1 % (ref 40–53)
HGB BLD-MCNC: 9.8 G/DL (ref 13.3–17.7)
MCH RBC QN AUTO: 34.5 PG (ref 26.5–33)
MCHC RBC AUTO-ENTMCNC: 33.7 G/DL (ref 31.5–36.5)
MCV RBC AUTO: 103 FL (ref 78–100)
PLATELET # BLD AUTO: 73 10E9/L (ref 150–450)
PLATELET # BLD AUTO: 74 10E9/L (ref 150–450)
POTASSIUM SERPL-SCNC: 4.2 MMOL/L (ref 3.4–5.3)
RBC # BLD AUTO: 2.84 10E12/L (ref 4.4–5.9)
SODIUM SERPL-SCNC: 142 MMOL/L (ref 133–144)
WBC # BLD AUTO: 5.2 10E9/L (ref 4–11)

## 2019-08-13 PROCEDURE — 85049 AUTOMATED PLATELET COUNT: CPT | Performed by: INTERNAL MEDICINE

## 2019-08-13 PROCEDURE — 70100 X-RAY EXAM OF JAW <4VIEWS: CPT | Mod: TC

## 2019-08-13 PROCEDURE — 99238 HOSP IP/OBS DSCHRG MGMT 30/<: CPT | Performed by: INTERNAL MEDICINE

## 2019-08-13 PROCEDURE — 25000132 ZZH RX MED GY IP 250 OP 250 PS 637: Mod: GY | Performed by: INTERNAL MEDICINE

## 2019-08-13 PROCEDURE — 85027 COMPLETE CBC AUTOMATED: CPT | Performed by: INTERNAL MEDICINE

## 2019-08-13 PROCEDURE — 36415 COLL VENOUS BLD VENIPUNCTURE: CPT | Performed by: INTERNAL MEDICINE

## 2019-08-13 PROCEDURE — 25800030 ZZH RX IP 258 OP 636: Performed by: INTERNAL MEDICINE

## 2019-08-13 PROCEDURE — 80048 BASIC METABOLIC PNL TOTAL CA: CPT | Performed by: INTERNAL MEDICINE

## 2019-08-13 RX ORDER — CITALOPRAM HYDROBROMIDE 20 MG/1
TABLET ORAL
Qty: 90 TABLET | Refills: 1 | OUTPATIENT
Start: 2019-08-13

## 2019-08-13 RX ORDER — CITALOPRAM HYDROBROMIDE 20 MG/1
20 TABLET ORAL DAILY
Qty: 30 TABLET | Refills: 1 | Status: SHIPPED | OUTPATIENT
Start: 2019-08-13 | End: 2019-10-29

## 2019-08-13 RX ORDER — LEVOFLOXACIN 500 MG/1
500 TABLET, FILM COATED ORAL DAILY
Qty: 5 TABLET | Refills: 0 | Status: SHIPPED | OUTPATIENT
Start: 2019-08-13 | End: 2019-10-29

## 2019-08-13 RX ADMIN — ASPIRIN 81 MG: 81 TABLET, COATED ORAL at 06:52

## 2019-08-13 RX ADMIN — LOPERAMIDE HYDROCHLORIDE 2 MG: 2 CAPSULE ORAL at 00:26

## 2019-08-13 RX ADMIN — GABAPENTIN 900 MG: 300 CAPSULE ORAL at 11:59

## 2019-08-13 RX ADMIN — LEVOFLOXACIN 500 MG: 500 TABLET, FILM COATED ORAL at 10:36

## 2019-08-13 RX ADMIN — OXYCODONE HYDROCHLORIDE 5 MG: 5 TABLET ORAL at 12:00

## 2019-08-13 RX ADMIN — GABAPENTIN 600 MG: 600 TABLET, FILM COATED ORAL at 06:52

## 2019-08-13 RX ADMIN — OXYCODONE HYDROCHLORIDE 20 MG: 20 TABLET, FILM COATED, EXTENDED RELEASE ORAL at 06:52

## 2019-08-13 RX ADMIN — CITALOPRAM HYDROBROMIDE 20 MG: 20 TABLET ORAL at 10:35

## 2019-08-13 RX ADMIN — GUAIFENESIN 600 MG: 600 TABLET, EXTENDED RELEASE ORAL at 06:52

## 2019-08-13 RX ADMIN — LOPERAMIDE HYDROCHLORIDE 2 MG: 2 CAPSULE ORAL at 12:00

## 2019-08-13 RX ADMIN — ACYCLOVIR 400 MG: 400 TABLET ORAL at 06:52

## 2019-08-13 RX ADMIN — POTASSIUM CHLORIDE 20 MEQ: 1500 TABLET, EXTENDED RELEASE ORAL at 06:52

## 2019-08-13 RX ADMIN — OXYCODONE HYDROCHLORIDE 10 MG: 10 TABLET, FILM COATED, EXTENDED RELEASE ORAL at 12:00

## 2019-08-13 RX ADMIN — SODIUM CHLORIDE, PRESERVATIVE FREE: 5 INJECTION INTRAVENOUS at 05:15

## 2019-08-13 RX ADMIN — OXYCODONE HYDROCHLORIDE 5 MG: 5 TABLET ORAL at 06:52

## 2019-08-13 RX ADMIN — OMEPRAZOLE 40 MG: 20 CAPSULE, DELAYED RELEASE ORAL at 06:52

## 2019-08-13 RX ADMIN — LOPERAMIDE HYDROCHLORIDE 2 MG: 2 CAPSULE ORAL at 06:52

## 2019-08-13 RX ADMIN — RDII 250 MG CAPSULE 250 MG: at 06:52

## 2019-08-13 ASSESSMENT — MIFFLIN-ST. JEOR: SCORE: 1618.63

## 2019-08-13 ASSESSMENT — ACTIVITIES OF DAILY LIVING (ADL)
ADLS_ACUITY_SCORE: 14

## 2019-08-13 NOTE — DISCHARGE SUMMARY
Admit Date:     08/10/2019   Discharge Date:     08/13/2019      DISCHARGE DIAGNOSES:   1.  Right community-acquired pneumonia.   2.  Multiple myeloma.   3.  Acute renal failure on chronic kidney disease, stage III.   4.  Right jaw pain, likely secondary to multiple myeloma lytic lesions.   5.  Hypertension.   6.  Reactive depression.      PROCEDURES:  None.      COMPLICATIONS:  None.      HOSPITAL COURSE:  Aden is a 79-year-old gentleman who has a longstanding history of multiple myeloma.  He underwent a previous stem cell transplant in 2011.  He has been having some issues with pneumonias over the last 6 to 9 months.  He, the last several days prior to his admission, was starting to become just more fatigued and weak.  He was somewhat reluctant to be evaluated.  She checked his room air sat and it was about 85% and eventually talked him into coming in.  He has been having some chronic diarrhea, a little cough and congestion.  Finally came to the ER, temperature was 101.3.  Room air sats were 88% initially.  Chest x-ray did show patchy sort of right-sided infiltrate.  Lactic acid 1.8.  He had blood cultures done.  His creatinine was elevated about 2.04 up over his usual baseline.  He was started on Zosyn and azithromycin.  He was started on IV fluids also.  During his hospital stay, the following issues were addressed:   1.  Pneumonia.  His O2 sats did improve relatively quickly.  Over the next day or two we were able to get him off the oxygen.  He had no further fevers.  His white count remained stable with counts right around the 5000 range.  His overall breathing improved significantly also.  Eventually switched him over to oral Levaquin and he tolerated a dose of that and decided to send him on this for 5 more days.  He definitely feels much better.  His lung sounds on the right side are markedly improved also.   2.  Acute renal failure on chronic kidney disease, stage III.  His usual baseline creatinine is  in the 1.3 to 1.4 range.  When he came in it was 40 and 2.04.  With IV fluids and time it came down to 17 and 1.41 today.  Sodium is 142, potassium 4.2, chloride 113.  We felt he was a little on the dry side of things also.   3.  Depression.  The patient was seemingly relatively stressed about his whole situation with the multiple myeloma.  Dr. Iniguez was the attending at the time and discussed starting him on some Celexa, which he did 20 mg daily.   4.  Jaw pain.  The patient, after arrival here, does complain of some right-sided mandibular pain with chewing and such.  Teeth all look fine.  I cannot palpate any abnormalities.  Did get just a plain jaw x-ray today and her Radiology notices some lytic lesions on that right mandibular area, so likely that is the source of this.  Whether or are completely new or not is unclear.  The patient just wishes us to push his x-ray over to [facility]'s system when he follows up with Oncology.      Otherwise, he has been up moving around.  His neuropathy is bothering him quite a bit.  His diarrhea is improving, which he has chronically.  His blood pressure is stable anywhere from 130 to 160 systolic.  He has been afebrile.  Sats are 95% on room air.  His counts have been basically stable.  White count is 5000, hemoglobin is 9.8, platelet count is 74,000, which has been running his baseline lately.  He is eating well.  Our plans are to discharge him home today.      DISCHARGE MEDICATIONS:  He will be discharged on:   1.  Acyclovir 400 mg b.i.d.   2.  Amitriptyline 10 mg every evening.   3.  Ascorbic acid 500 mg daily.   4.  Aspirin 81 mg daily.   5.  Citalopram 20 mg daily.   6.  Diltiazem  daily.   7.  Ferrous fumarate 1 tablet daily.   8.  Gabapentin 600 mg.  He takes 4 tablets per day, 1 at 7:00, 1-1/2 at noon and 1-1/2 at 7:00 p.m.   9.  Ixekizumab.  He takes that 2.3 mg every 7 days on Wednesdays.   10.  Levaquin 500 mg daily for 5 days.   11.  Omeprazole 40 mg daily.    12.  OxyContin 4 tablets daily, 2 at 7:00, 1 at noon and 1 at 7:00 p.m.   13.  Oxycodone immediate release 1 tablet at 7:00, noon and 7:00 p.m. and an additional 1 as needed.   14.  KCl 20 mEq daily.   15.  Flomax 0.4 mg daily.      ACTIVITIES:  As tolerated.      DIET:  As tolerated.      He will follow up with Dr. Olivares within the next 7 days for hospital evaluation.  Would consider a CBC and Chem 8 at that time.  He will follow up with Oncology as scheduled.      CODE STATUS:  The patient is DNR/DNI per his request.         HARI GUERIN MD             D: 2019   T: 2019   MT: JENNIFER      Name:     MATTHEW RADFORD   MRN:      [id number]        Account:        [id number]   :      1940           Admit Date:     08/10/2019                                  Discharge Date: 2019      Document: [id number]       cc: Ramiro Olivares DO

## 2019-08-13 NOTE — PLAN OF CARE
"Patient continues to c/o right jaw discomfort. He's rating discomfort 1-4/10. PRN Roxicodone given at the beginning of evening shift followed by scheduled home medications shortly after. Patient did state that his pain was improved. He stated \"I had too many visitors today, too much talking. At the end of night shift, patient c/o tingling pain in BLE. Scheduled home meds, including Oxycontin and Oxycodone PO were given. Patient also put his ruthann socks on. BP was elevated at the beginning of evening shift but was increased at the beginning of night shift, 186/72. He denied having a headache, feeling dizzy or lightheaded. Home blood pressure medications currently not ordered. MD was updated, no new orders. VSS otherwise. Patient has denied any respiratory difficulty. O2 sat maintaining > /= 92% on room air on evening and night shift. Lung sounds have improved, from having coarse crackles in the right on evening shift to fine crackles in bilateral bases on night shift. Rare dry cough. Oral intake adequate. Urine output adequate. Patient reports continued small, loose BM's. He does state that \"they've slowed down.\" Patient is up independently in room. Steady on his feet. Walking into the bathroom. Call light within reach. IVF continues. PO antibiotic continues. Patient states he's hoping to go home tomorrow.    Face to face report given with opportunity to observe patient.    Report given to Melo Salvador   8/13/2019  7:55 AM      "

## 2019-08-13 NOTE — PLAN OF CARE
Pt c/o pain in rt jaw, had xray done with results given to pt by MD.  VSS, afebrile.  Lungs clear, on ra with SATS mid 90's.  Started on oral Levaquin today.  Gave scheduled pain meds.  IV removed for discharge.  Call light in reach.  Walked in lawson independently, stable on feet.  Makes needs known.      Patient discharged at 12:25 PM via wheel chair accompanied by spouse and staff. Prescriptions sent to patients preferred pharmacy. All belongings sent with patient.     Discharge instructions reviewed with pt. Listed belongings gathered and returned to patient. yes    Patient discharged to home.   Report called to na     Core Measures and Vaccines  Core Measures applicable during stay:yes. If yes, state diagnosis: pneumonia   Pneumonia and Influenza given prior to discharge, if indicated: N/A    Surgical Patient   Surgical Procedures during stay: no  Did patient receive discharge instruction on wound care and recognition of infection symptoms? N/A    MISC  Follow up appointment made:  Mobile Family to call pt at home to set up appt.   Home and hospital aquired medications returned to patient: N/A  Patient reports pain was well managed at discharge: Yes

## 2019-08-13 NOTE — DISCHARGE INSTRUCTIONS
[facility] will be calling you to schedule your follow up appointment with Dr. Olivares.  If you do not receive a phone call regarding this appointment, please call [phone].

## 2019-08-13 NOTE — PROGRESS NOTES
Name: Aden Rosa    MRN#: [id number]    Reason for Hospitalization: Pneumonia of right lung due to infectious organism, unspecified part of lung [J18.9]  Multiple myeloma, remission status unspecified (H) [C90.00]    Discharge Date: 8/13/2019    Patient / Family response to discharge plan: in agreement    Follow-Up Appt: No future appointments.    Other Providers (Care Coordinator, County Services, PCA services etc): No    Discharge Disposition: home, with wife transporting.    Jenna Rasheed

## 2019-08-13 NOTE — PROVIDER NOTIFICATION
"Checked in with MD RE: text page sent about midnight blood pressure of 186/72. HR has been in the 60's-70's, HRR. Patient continues to c/o right jaw pain that he'd had on day shift. Otherwise he reports feeling tired \"because I had too many visitors today.\" Reviewed previous MD notes with on call MD. No new orders. Will continue to monitor patient.  "

## 2019-08-14 LAB
BACTERIA SPEC CULT: NORMAL
SPECIMEN SOURCE: NORMAL

## 2019-08-15 LAB
EBV DNA SPEC QL NAA+PROBE: NOT DETECTED
SPECIMEN SOURCE: NORMAL

## 2019-08-16 ENCOUNTER — TELEPHONE (OUTPATIENT)
Dept: CASE MANAGEMENT | Facility: HOSPITAL | Age: 79
End: 2019-08-16

## 2019-08-16 LAB
BACTERIA SPEC CULT: NORMAL
BACTERIA SPEC CULT: NORMAL
Lab: NORMAL
SPECIMEN SOURCE: NORMAL
SPECIMEN SOURCE: NORMAL

## 2019-10-29 ENCOUNTER — APPOINTMENT (OUTPATIENT)
Dept: GENERAL RADIOLOGY | Facility: HOSPITAL | Age: 79
End: 2019-10-29
Attending: PHYSICIAN ASSISTANT
Payer: MEDICARE

## 2019-10-29 ENCOUNTER — HOSPITAL ENCOUNTER (EMERGENCY)
Facility: HOSPITAL | Age: 79
Discharge: HOME | End: 2019-10-29
Attending: PHYSICIAN ASSISTANT | Admitting: PHYSICIAN ASSISTANT
Payer: MEDICARE

## 2019-10-29 VITALS
DIASTOLIC BLOOD PRESSURE: 69 MMHG | OXYGEN SATURATION: 95 % | TEMPERATURE: 98.5 F | HEART RATE: 84 BPM | SYSTOLIC BLOOD PRESSURE: 147 MMHG | RESPIRATION RATE: 17 BRPM

## 2019-10-29 DIAGNOSIS — R79.89 ELEVATED SERUM CREATININE: ICD-10-CM

## 2019-10-29 DIAGNOSIS — J18.9 PNEUMONIA OF LEFT LUNG DUE TO INFECTIOUS ORGANISM, UNSPECIFIED PART OF LUNG: ICD-10-CM

## 2019-10-29 LAB
ALBUMIN SERPL-MCNC: 3.4 G/DL (ref 3.4–5)
ALP SERPL-CCNC: 108 U/L (ref 40–150)
ALT SERPL W P-5'-P-CCNC: 125 U/L (ref 0–70)
AMMONIA PLAS-SCNC: 16 UMOL/L (ref 10–50)
ANION GAP SERPL CALCULATED.3IONS-SCNC: 4 MMOL/L (ref 3–14)
AST SERPL W P-5'-P-CCNC: 159 U/L (ref 0–45)
BASOPHILS # BLD AUTO: 0 10E9/L (ref 0–0.2)
BASOPHILS NFR BLD AUTO: 0.3 %
BILIRUB SERPL-MCNC: 1.1 MG/DL (ref 0.2–1.3)
BUN SERPL-MCNC: 28 MG/DL (ref 7–30)
CALCIUM SERPL-MCNC: 8.5 MG/DL (ref 8.5–10.1)
CHLORIDE SERPL-SCNC: 107 MMOL/L (ref 94–109)
CO2 SERPL-SCNC: 27 MMOL/L (ref 20–32)
CREAT SERPL-MCNC: 2.38 MG/DL (ref 0.66–1.25)
D DIMER PPP FEU-MCNC: 1.4 UG/ML FEU (ref 0–0.5)
DIFFERENTIAL METHOD BLD: ABNORMAL
EOSINOPHIL # BLD AUTO: 0 10E9/L (ref 0–0.7)
EOSINOPHIL NFR BLD AUTO: 0.3 %
ERYTHROCYTE [DISTWIDTH] IN BLOOD BY AUTOMATED COUNT: 15.9 % (ref 10–15)
GFR SERPL CREATININE-BSD FRML MDRD: 25 ML/MIN/{1.73_M2}
GLUCOSE SERPL-MCNC: 97 MG/DL (ref 70–99)
HCT VFR BLD AUTO: 29.7 % (ref 40–53)
HGB BLD-MCNC: 9.7 G/DL (ref 13.3–17.7)
IMM GRANULOCYTES # BLD: 0.1 10E9/L (ref 0–0.4)
IMM GRANULOCYTES NFR BLD: 0.6 %
LACTATE BLD-SCNC: 1.6 MMOL/L (ref 0.7–2)
LYMPHOCYTES # BLD AUTO: 1.7 10E9/L (ref 0.8–5.3)
LYMPHOCYTES NFR BLD AUTO: 16.3 %
MCH RBC QN AUTO: 34.6 PG (ref 26.5–33)
MCHC RBC AUTO-ENTMCNC: 32.7 G/DL (ref 31.5–36.5)
MCV RBC AUTO: 106 FL (ref 78–100)
MONOCYTES # BLD AUTO: 1.4 10E9/L (ref 0–1.3)
MONOCYTES NFR BLD AUTO: 13.3 %
NEUTROPHILS # BLD AUTO: 7.4 10E9/L (ref 1.6–8.3)
NEUTROPHILS NFR BLD AUTO: 69.2 %
NRBC # BLD AUTO: 0 10*3/UL
NRBC BLD AUTO-RTO: 0 /100
NT-PROBNP SERPL-MCNC: 824 PG/ML (ref 0–1800)
PLATELET # BLD AUTO: 115 10E9/L (ref 150–450)
POTASSIUM SERPL-SCNC: 4.5 MMOL/L (ref 3.4–5.3)
PROCALCITONIN SERPL-MCNC: 0.62 NG/ML
PROT SERPL-MCNC: 7 G/DL (ref 6.8–8.8)
RBC # BLD AUTO: 2.8 10E12/L (ref 4.4–5.9)
SODIUM SERPL-SCNC: 138 MMOL/L (ref 133–144)
TROPONIN I SERPL-MCNC: <0.015 UG/L (ref 0–0.04)
WBC # BLD AUTO: 10.7 10E9/L (ref 4–11)

## 2019-10-29 PROCEDURE — 25000132 ZZH RX MED GY IP 250 OP 250 PS 637: Mod: GY | Performed by: PHYSICIAN ASSISTANT

## 2019-10-29 PROCEDURE — 84145 PROCALCITONIN (PCT): CPT | Performed by: PHYSICIAN ASSISTANT

## 2019-10-29 PROCEDURE — 85025 COMPLETE CBC W/AUTO DIFF WBC: CPT | Performed by: PHYSICIAN ASSISTANT

## 2019-10-29 PROCEDURE — 85379 FIBRIN DEGRADATION QUANT: CPT | Performed by: PHYSICIAN ASSISTANT

## 2019-10-29 PROCEDURE — 99284 EMERGENCY DEPT VISIT MOD MDM: CPT | Mod: Z6 | Performed by: PHYSICIAN ASSISTANT

## 2019-10-29 PROCEDURE — 93005 ELECTROCARDIOGRAM TRACING: CPT

## 2019-10-29 PROCEDURE — 36415 COLL VENOUS BLD VENIPUNCTURE: CPT | Performed by: PHYSICIAN ASSISTANT

## 2019-10-29 PROCEDURE — 71046 X-RAY EXAM CHEST 2 VIEWS: CPT | Mod: TC

## 2019-10-29 PROCEDURE — 93010 ELECTROCARDIOGRAM REPORT: CPT | Performed by: INTERNAL MEDICINE

## 2019-10-29 PROCEDURE — 83880 ASSAY OF NATRIURETIC PEPTIDE: CPT | Performed by: PHYSICIAN ASSISTANT

## 2019-10-29 PROCEDURE — 84484 ASSAY OF TROPONIN QUANT: CPT | Performed by: PHYSICIAN ASSISTANT

## 2019-10-29 PROCEDURE — 83605 ASSAY OF LACTIC ACID: CPT | Performed by: PHYSICIAN ASSISTANT

## 2019-10-29 PROCEDURE — 82140 ASSAY OF AMMONIA: CPT | Performed by: PHYSICIAN ASSISTANT

## 2019-10-29 PROCEDURE — 99285 EMERGENCY DEPT VISIT HI MDM: CPT | Mod: 25

## 2019-10-29 PROCEDURE — 80053 COMPREHEN METABOLIC PANEL: CPT | Performed by: PHYSICIAN ASSISTANT

## 2019-10-29 RX ORDER — DOXYCYCLINE 100 MG/1
100 CAPSULE ORAL 2 TIMES DAILY
Qty: 14 CAPSULE | Refills: 0 | Status: SHIPPED | OUTPATIENT
Start: 2019-10-29 | End: 2019-11-05

## 2019-10-29 RX ORDER — DILTIAZEM HYDROCHLORIDE 120 MG/1
CAPSULE, EXTENDED RELEASE ORAL
Refills: 2 | COMMUNITY
Start: 2019-08-10 | End: 2019-10-29

## 2019-10-29 RX ORDER — DOXYCYCLINE HYCLATE 100 MG
100 TABLET ORAL EVERY 12 HOURS SCHEDULED
Status: DISCONTINUED | OUTPATIENT
Start: 2019-10-29 | End: 2019-10-29 | Stop reason: HOSPADM

## 2019-10-29 RX ORDER — POTASSIUM CHLORIDE 750 MG/1
TABLET, EXTENDED RELEASE ORAL
Refills: 2 | COMMUNITY
Start: 2019-07-16 | End: 2022-08-17

## 2019-10-29 RX ORDER — DIPHENOXYLATE HCL/ATROPINE 2.5-.025MG
1 TABLET ORAL 4 TIMES DAILY PRN
COMMUNITY
Start: 2019-09-25

## 2019-10-29 RX ADMIN — DOXYCYCLINE HYCLATE 100 MG: 100 TABLET, FILM COATED ORAL at 20:14

## 2019-10-29 NOTE — ED PROVIDER NOTES
History     Chief Complaint   Patient presents with     Other     hypoxic at [facility]     HPI  Aden Rosa is a 79 year old male who presents the emergency department from his clinic via ambulance.  Patient has had increasing dyspnea and hypoxia over the last month.  Patient has a history of multiple myeloma and is recently started on a new medication.  Since starting the medication he has noted increasing jerking motions in the neck and hands and states that it is been getting progressively worse to the point where it has been severe at this time.  Patient was under the impression that he was supposed to be going directly to [facility] however EMS brought him here.  He seems to be very upset that he had been brought here to be seen at all.  Patient denies recent fever, chest pain, upper respiratory symptoms.    Allergies:  Allergies   Allergen Reactions     Dristan      Hydrocodone-Homatropine      Unable to void     Hydromet [Hydrocodone W/Homatropine]      Prochlorperazine      Prochlorperazine Maleate      Compazine         Problem List:    Patient Active Problem List    Diagnosis Date Noted     Acute renal failure superimposed on stage 3 chronic kidney disease (H) 08/13/2019     Priority: Medium     CAP (community acquired pneumonia) 08/10/2019     Priority: Medium     Community acquired pneumonia of right lower lobe of lung (H) 03/31/2019     Priority: Medium     SIRS (systemic inflammatory response syndrome) (H) 03/31/2019     Priority: Medium     Sepsis due to pneumonia (H) 03/31/2019     Priority: Medium     Kidney stones      Priority: Medium     Spinal stenosis      Priority: Medium     with chronic back pain       Hypertension      Priority: Medium     Multiple myeloma (H) 03/21/2011     Priority: Medium     updating diagnosis code for icd10 cutover       Encounter for long-term current use of medication 03/21/2011     Priority: Medium     updating diagnosis code for icd10 cutover        Stem cells transplant status (H) 2011     Priority: Medium     [facility]           Past Medical History:    Past Medical History:   Diagnosis Date     Hypertension      Kidney stones      Multiple myeloma (H)      Spinal stenosis      Stem cells transplant status (H)        Past Surgical History:    Past Surgical History:   Procedure Laterality Date     BACK SURGERY  2010    Microsurgery     C TOTAL KNEE ARTHROPLASTY Right      CHOLECYSTECTOMY       HEMORRHOIDECTOMY      x2       Family History:    Family History   Problem Relation Age of Onset     Cancer Other         hodgkins     Diabetes Other      Heart Disease Father 82        disease     Other - See Comments Other      Alzheimer Disease Mother          at 97     Diabetes Type 2  Sister        Social History:  Marital Status:   [2]  Social History     Tobacco Use     Smoking status: Never Smoker     Smokeless tobacco: Never Used   Substance Use Topics     Alcohol use: No     Drug use: No        Medications:    acyclovir (ZOVIRAX) 400 MG tablet  amitriptyline (ELAVIL) 10 MG tablet  aspirin 81 MG EC tablet  diltiazem ER (DILT-XR) 120 MG 24 hr capsule  diphenoxylate-atropine (LOMOTIL) 2.5-0.025 MG tablet  doxycycline hyclate (VIBRAMYCIN) 100 MG capsule  FERROUS SULFATE PO  gabapentin (NEURONTIN) 600 MG tablet  lisinopril (PRINIVIL/ZESTRIL) 5 MG tablet  loperamide (IMODIUM A-D) 2 MG tablet  omeprazole (PRILOSEC) 40 MG capsule  oxyCODONE HCl (OXAYDO) 5 MG TABA  potassium chloride ER (K-TAB/KLOR-CON) 10 MEQ CR tablet  tamsulosin (FLOMAX) 0.4 MG capsule          Review of Systems   Constitutional: Negative for fever.   HENT: Negative.    Respiratory: Positive for shortness of breath. Negative for cough.    Cardiovascular: Negative for chest pain.   Skin: Negative.    Neurological: Positive for tremors.       Physical Exam   BP: 131/69  Pulse: 76  Temp: 98.6  F (37  C)  Resp: 18  SpO2: 93 %      Physical Exam  Constitutional:       General: He  is not in acute distress.     Appearance: Normal appearance. He is not diaphoretic.   HENT:      Head: Atraumatic.   Eyes:      General: No scleral icterus.     Pupils: Pupils are equal, round, and reactive to light.   Neck:      Musculoskeletal: Normal range of motion.   Cardiovascular:      Heart sounds: Normal heart sounds.   Pulmonary:      Effort: No respiratory distress.      Breath sounds: Normal breath sounds.   Abdominal:      General: Bowel sounds are normal.      Palpations: Abdomen is soft.      Tenderness: There is no tenderness.   Musculoskeletal:         General: No tenderness.      Right lower leg: No edema.      Left lower leg: No edema.   Skin:     General: Skin is warm.      Findings: No rash.   Neurological:      Mental Status: He is alert.      Comments: Jerking tremor apparently present in the neck and fingers.         ED Course        Procedures  Dyspnea and hypoxia concerning for pneumonia, PE, cardiac cause.  BNP, ECG and troponin unremarkable.  D-dimer, when adjusted for age and new measurement is within normal range.  Low suspicion for PE.  CXR shows right lung infiltrate.  Began treatment with doxycycline.  First dose given in hospital.  O2 sats remained around 93%.  Arranged for patient to get a ride home.  Recommended patient f/u with oncologist to consider alternate medications for treatment if jerking tremor is likely attributed to this.  Recommended recheck with primary provider within the next week if no improvement in dyspnea or return to ED if continually worsening symptoms.               EKG Interpretation:      Interpreted by DEBORAH Turk  Time reviewed: 1755  Symptoms at time of EKG: dyspnea   Rhythm: normal sinus   Rate: 78 bpm  Axis: normal  Ectopy: none  Conduction: normal  ST Segments/ T Waves: No ST-T wave changes  Q Waves: none    Clinical Impression: normal EKG         Results for orders placed or performed during the hospital encounter of 10/29/19 (from the past  24 hour(s))   CBC with platelets differential   Result Value Ref Range    WBC 10.7 4.0 - 11.0 10e9/L    RBC Count 2.80 (L) 4.4 - 5.9 10e12/L    Hemoglobin 9.7 (L) 13.3 - 17.7 g/dL    Hematocrit 29.7 (L) 40.0 - 53.0 %     (H) 78 - 100 fl    MCH 34.6 (H) 26.5 - 33.0 pg    MCHC 32.7 31.5 - 36.5 g/dL    RDW 15.9 (H) 10.0 - 15.0 %    Platelet Count 115 (L) 150 - 450 10e9/L    Diff Method Automated Method     % Neutrophils 69.2 %    % Lymphocytes 16.3 %    % Monocytes 13.3 %    % Eosinophils 0.3 %    % Basophils 0.3 %    % Immature Granulocytes 0.6 %    Nucleated RBCs 0 0 /100    Absolute Neutrophil 7.4 1.6 - 8.3 10e9/L    Absolute Lymphocytes 1.7 0.8 - 5.3 10e9/L    Absolute Monocytes 1.4 (H) 0.0 - 1.3 10e9/L    Absolute Eosinophils 0.0 0.0 - 0.7 10e9/L    Absolute Basophils 0.0 0.0 - 0.2 10e9/L    Abs Immature Granulocytes 0.1 0 - 0.4 10e9/L    Absolute Nucleated RBC 0.0    Comprehensive metabolic panel   Result Value Ref Range    Sodium 138 133 - 144 mmol/L    Potassium 4.5 3.4 - 5.3 mmol/L    Chloride 107 94 - 109 mmol/L    Carbon Dioxide 27 20 - 32 mmol/L    Anion Gap 4 3 - 14 mmol/L    Glucose 97 70 - 99 mg/dL    Urea Nitrogen 28 7 - 30 mg/dL    Creatinine 2.38 (H) 0.66 - 1.25 mg/dL    GFR Estimate 25 (L) >60 mL/min/[1.73_m2]    GFR Estimate If Black 29 (L) >60 mL/min/[1.73_m2]    Calcium 8.5 8.5 - 10.1 mg/dL    Bilirubin Total 1.1 0.2 - 1.3 mg/dL    Albumin 3.4 3.4 - 5.0 g/dL    Protein Total 7.0 6.8 - 8.8 g/dL    Alkaline Phosphatase 108 40 - 150 U/L     (H) 0 - 70 U/L     (H) 0 - 45 U/L   Ammonia   Result Value Ref Range    Ammonia 16 10 - 50 umol/L   Troponin I   Result Value Ref Range    Troponin I ES <0.015 0.000 - 0.045 ug/L   Nt probnp inpatient (BNP)   Result Value Ref Range    N-Terminal Pro BNP Inpatient 824 0 - 1,800 pg/mL   D dimer quantitative   Result Value Ref Range    D Dimer 1.4 (H) 0.0 - 0.50 ug/ml FEU   Lactic acid whole blood   Result Value Ref Range    Lactic Acid 1.6 0.7 -  2.0 mmol/L   Procalcitonin   Result Value Ref Range    Procalcitonin 0.62 ng/ml   XR Chest 2 Views    Narrative    PROCEDURE:  XR CHEST 2 VW    HISTORY:  dyspnea, hypoxia.     COMPARISON:  August 10, 2019    FINDINGS:   The heart is enlarged the right lung infiltrate seen previously in  August has resolved.  There is a new left lung infiltrate seen  suspicious for pneumonia. No pleural effusion or pneumothorax.      Impression    IMPRESSION:  Left lung infiltrate suspicious for pneumonia.      RONEL WILLSON MD       Medications - No data to display    Assessments & Plan (with Medical Decision Making)     I have reviewed the nursing notes.    I have reviewed the findings, diagnosis, plan and need for follow up with the patient.    New Prescriptions    DOXYCYCLINE HYCLATE (VIBRAMYCIN) 100 MG CAPSULE    Take 1 capsule (100 mg) by mouth 2 times daily for 7 days       Final diagnoses:   Pneumonia of left lung due to infectious organism, unspecified part of lung     DEBORAH Turk on 10/31/2019 at 11:07 AM   10/29/2019   [facility]     Titus Robertson PA  10/31/19 1104

## 2019-10-29 NOTE — ED AVS SNAPSHOT
[facility]  [address] [address]  Phone:  [phone]                                    Aden Rosa   MRN: [id number]    Department:  [facility]   Date of Visit:  10/29/2019           After Visit Summary Signature Page    I have received my discharge instructions, and my questions have been answered. I have discussed any challenges I see with this plan with the nurse or doctor.    ..........................................................................................................................................  Patient/Patient Representative Signature      ..........................................................................................................................................  Patient Representative Print Name and Relationship to Patient    ..................................................               ................................................  Date                                   Time    ..........................................................................................................................................  Reviewed by Signature/Title    ...................................................              ..............................................  Date                                               Time          22EPIC Rev 08/18

## 2019-10-30 NOTE — ED NOTES
Discharge instructions given. Verbalized understanding but expressed frustration due to he was under the impression he was going to [facility] in [place] per Dr. Olivares. States Dr. Olivares want him sent to [place] so his wife went home and is unable to drive back from [place] to pick him up due to she can't drive at night. DEBORAH Marrero, informed of this and states he will call Dr. Olivares and discuss patient's concerns with him. Patient informed that Raul is calling to speak with Dr. Olivares and will update patient after the conversation. Verbalized understanding.

## 2019-10-30 NOTE — ED NOTES
Patient states friend Margarito will come pick him up and drive him home. Patient will wait in the ED waiting room. Ryan from [facility] no longer needed to transport patient home.

## 2019-10-30 NOTE — ED NOTES
Ryan from [facility] here to transport patient home. Patient states he is going to try to find a different ride to save the $100 [facility] would cost. Staff called [facility] services to see how much they would cost. Cost was $80 but they are unable to transport patient home tonight. Ryan from [facility]  ambulated with patient to waiting room while patient attempted to find another ride. If patient is unable to find someone to come pick him up then Ryan will transport patient home.

## 2019-10-31 ASSESSMENT — ENCOUNTER SYMPTOMS
COUGH: 0
SHORTNESS OF BREATH: 1
TREMORS: 1
FEVER: 0

## 2019-12-04 NOTE — PLAN OF CARE
"Adult Inpatient Plan of Care  Plan of Care Review  4/1/2019 0901 by Gretta Sawyer, RN  Note  Pt A&O x4, LS have crackles throughout, pt weaned to 2L of O2 via NC. Pt very irritable this morning, swearing at staff, stating we will rip his IV out if the pump alarms.  Pt refusing bed alarm. Will continue to monitor.   Gretta Sawyer, RN on 4/1/2019 at 9:02 AM      Pt placed back on 3L. Wife at bedside. Pt declined breakfast due to diarrhea, had jello for lunch, had diarrhea soon after. Pt and wife upset, stating that he takes Imodium at exactly 7am and 7pm. Pt states he is having diarrhea due to pt not getting his 7pm dose of Imodium until 2215. Pt's wife states he \"takes his medications right on the dot of 7 am/pm and noon or else he's in trouble.\" Pt continues to have right upper quadrant pain, cold pack given.   Gretta Sawyer, RN on 4/1/2019 at 12:26 PM         " (1) 41-60 years

## 2021-02-12 ENCOUNTER — IMMUNIZATION (OUTPATIENT)
Dept: FAMILY MEDICINE | Facility: OTHER | Age: 81
End: 2021-02-12
Attending: THORACIC SURGERY (CARDIOTHORACIC VASCULAR SURGERY)
Payer: MEDICARE

## 2021-02-12 PROCEDURE — 91300 PR COVID VAC PFIZER DIL RECON 30 MCG/0.3 ML IM: CPT

## 2021-03-05 ENCOUNTER — IMMUNIZATION (OUTPATIENT)
Dept: FAMILY MEDICINE | Facility: OTHER | Age: 81
End: 2021-03-05
Attending: FAMILY MEDICINE
Payer: MEDICARE

## 2021-03-05 PROCEDURE — 91300 PR COVID VAC PFIZER DIL RECON 30 MCG/0.3 ML IM: CPT

## 2021-09-01 ENCOUNTER — IMMUNIZATION (OUTPATIENT)
Dept: FAMILY MEDICINE | Facility: OTHER | Age: 81
End: 2021-09-01
Payer: MEDICARE

## 2021-09-01 PROCEDURE — 0003A PR COVID VAC PFIZER DIL RECON 30 MCG/0.3 ML IM: CPT

## 2022-08-17 ENCOUNTER — APPOINTMENT (OUTPATIENT)
Dept: CT IMAGING | Facility: OTHER | Age: 82
DRG: 682 | End: 2022-08-17
Attending: FAMILY MEDICINE
Payer: MEDICARE

## 2022-08-17 ENCOUNTER — HOSPITAL ENCOUNTER (INPATIENT)
Facility: OTHER | Age: 82
LOS: 3 days | Discharge: HOME | DRG: 682 | End: 2022-08-20
Attending: FAMILY MEDICINE | Admitting: INTERNAL MEDICINE
Payer: MEDICARE

## 2022-08-17 ENCOUNTER — APPOINTMENT (OUTPATIENT)
Dept: GENERAL RADIOLOGY | Facility: OTHER | Age: 82
DRG: 682 | End: 2022-08-17
Attending: FAMILY MEDICINE
Payer: MEDICARE

## 2022-08-17 DIAGNOSIS — N18.30 ACUTE RENAL FAILURE SUPERIMPOSED ON STAGE 3 CHRONIC KIDNEY DISEASE, UNSPECIFIED ACUTE RENAL FAILURE TYPE, UNSPECIFIED WHETHER STAGE 3A OR 3B CKD (H): ICD-10-CM

## 2022-08-17 DIAGNOSIS — I95.9 HYPOTENSION, UNSPECIFIED HYPOTENSION TYPE: ICD-10-CM

## 2022-08-17 DIAGNOSIS — N17.9 ACUTE RENAL FAILURE SUPERIMPOSED ON STAGE 3 CHRONIC KIDNEY DISEASE, UNSPECIFIED ACUTE RENAL FAILURE TYPE, UNSPECIFIED WHETHER STAGE 3A OR 3B CKD (H): ICD-10-CM

## 2022-08-17 PROBLEM — E86.1 HYPOVOLEMIA DEHYDRATION: Status: ACTIVE | Noted: 2022-08-17

## 2022-08-17 LAB
ALBUMIN SERPL-MCNC: 3.1 G/DL (ref 3.5–5.7)
ALBUMIN UR-MCNC: NEGATIVE MG/DL
ALP SERPL-CCNC: 52 U/L (ref 34–104)
ALT SERPL W P-5'-P-CCNC: 19 U/L (ref 7–52)
AMPHETAMINES UR QL: NOT DETECTED
ANION GAP SERPL CALCULATED.3IONS-SCNC: 9 MMOL/L (ref 3–14)
APPEARANCE UR: CLEAR
AST SERPL W P-5'-P-CCNC: 41 U/L (ref 13–39)
ATRIAL RATE - MUSE: 93 BPM
BARBITURATES UR QL SCN: NOT DETECTED
BASE EXCESS BLDV CALC-SCNC: -4.3 MMOL/L (ref -7.7–1.9)
BASOPHILS # BLD AUTO: 0 10E3/UL (ref 0–0.2)
BASOPHILS NFR BLD AUTO: 0 %
BENZODIAZ UR QL SCN: NOT DETECTED
BILIRUB SERPL-MCNC: 1 MG/DL (ref 0.3–1)
BILIRUB UR QL STRIP: NEGATIVE
BUN SERPL-MCNC: 32 MG/DL (ref 7–25)
BUPRENORPHINE UR QL: NOT DETECTED
CALCIUM SERPL-MCNC: 8.8 MG/DL (ref 8.6–10.3)
CANNABINOIDS UR QL: NOT DETECTED
CHLORIDE BLD-SCNC: 104 MMOL/L (ref 98–107)
CO2 SERPL-SCNC: 22 MMOL/L (ref 21–31)
COCAINE UR QL SCN: NOT DETECTED
COLOR UR AUTO: ABNORMAL
CREAT SERPL-MCNC: 3.31 MG/DL (ref 0.7–1.3)
D-METHAMPHET UR QL: NOT DETECTED
DIASTOLIC BLOOD PRESSURE - MUSE: NORMAL MMHG
EOSINOPHIL # BLD AUTO: 0 10E3/UL (ref 0–0.7)
EOSINOPHIL NFR BLD AUTO: 0 %
ERYTHROCYTE [DISTWIDTH] IN BLOOD BY AUTOMATED COUNT: 14.7 % (ref 10–15)
ETHANOL SERPL-MCNC: <0.01 G/DL
FLUAV RNA SPEC QL NAA+PROBE: NEGATIVE
FLUBV RNA RESP QL NAA+PROBE: NEGATIVE
GFR SERPL CREATININE-BSD FRML MDRD: 18 ML/MIN/1.73M2
GLUCOSE BLD-MCNC: 126 MG/DL (ref 70–105)
GLUCOSE UR STRIP-MCNC: NEGATIVE MG/DL
HCO3 BLDV-SCNC: 21 MMOL/L (ref 21–28)
HCT VFR BLD AUTO: 28 % (ref 40–53)
HGB BLD-MCNC: 9.4 G/DL (ref 13.3–17.7)
HGB UR QL STRIP: NEGATIVE
HYALINE CASTS: 3 /LPF
IMM GRANULOCYTES # BLD: 0.1 10E3/UL
IMM GRANULOCYTES NFR BLD: 0 %
INR PPP: 1.21 (ref 0.85–1.15)
INTERPRETATION ECG - MUSE: NORMAL
KETONES UR STRIP-MCNC: NEGATIVE MG/DL
LACTATE SERPL-SCNC: 2 MMOL/L (ref 0.7–2)
LEUKOCYTE ESTERASE UR QL STRIP: NEGATIVE
LYMPHOCYTES # BLD AUTO: 0.5 10E3/UL (ref 0.8–5.3)
LYMPHOCYTES NFR BLD AUTO: 4 %
MAGNESIUM SERPL-MCNC: 1.9 MG/DL (ref 1.9–2.7)
MCH RBC QN AUTO: 34.1 PG (ref 26.5–33)
MCHC RBC AUTO-ENTMCNC: 33.6 G/DL (ref 31.5–36.5)
MCV RBC AUTO: 101 FL (ref 78–100)
METHADONE UR QL SCN: NOT DETECTED
MONOCYTES # BLD AUTO: 1.3 10E3/UL (ref 0–1.3)
MONOCYTES NFR BLD AUTO: 11 %
MUCOUS THREADS #/AREA URNS LPF: PRESENT /LPF
NEUTROPHILS # BLD AUTO: 9.5 10E3/UL (ref 1.6–8.3)
NEUTROPHILS NFR BLD AUTO: 85 %
NITRATE UR QL: NEGATIVE
NRBC # BLD AUTO: 0 10E3/UL
NRBC BLD AUTO-RTO: 0 /100
NT-PROBNP SERPL-MCNC: 108 PG/ML (ref 0–100)
O2/TOTAL GAS SETTING VFR VENT: 28 %
OPIATES UR QL SCN: ABNORMAL
OXYCODONE UR QL SCN: ABNORMAL
OXYHGB MFR BLDV: 97 % (ref 70–75)
P AXIS - MUSE: 22 DEGREES
PCO2 BLDV: 41 MM HG (ref 40–50)
PCP UR QL SCN: NOT DETECTED
PH BLDV: 7.33 [PH] (ref 7.32–7.43)
PH UR STRIP: 5 [PH] (ref 5–9)
PLATELET # BLD AUTO: 149 10E3/UL (ref 150–450)
PO2 BLDV: 147 MM HG (ref 25–47)
POTASSIUM BLD-SCNC: 3.9 MMOL/L (ref 3.5–5.1)
PR INTERVAL - MUSE: 182 MS
PROPOXYPH UR QL: NOT DETECTED
PROT SERPL-MCNC: 6.3 G/DL (ref 6.4–8.9)
QRS DURATION - MUSE: 86 MS
QT - MUSE: 346 MS
QTC - MUSE: 430 MS
R AXIS - MUSE: -13 DEGREES
RBC # BLD AUTO: 2.76 10E6/UL (ref 4.4–5.9)
RBC URINE: 1 /HPF
RSV RNA SPEC NAA+PROBE: NEGATIVE
SARS-COV-2 RNA RESP QL NAA+PROBE: NEGATIVE
SODIUM SERPL-SCNC: 135 MMOL/L (ref 134–144)
SP GR UR STRIP: 1.01 (ref 1–1.03)
SYSTOLIC BLOOD PRESSURE - MUSE: NORMAL MMHG
T AXIS - MUSE: 0 DEGREES
TRICYCLICS UR QL SCN: NOT DETECTED
TROPONIN I SERPL-MCNC: 34.2 PG/ML (ref 0–34)
TROPONIN I SERPL-MCNC: 42.1 PG/ML (ref 0–34)
TSH SERPL DL<=0.005 MIU/L-ACNC: 0.47 MU/L (ref 0.4–4)
UROBILINOGEN UR STRIP-MCNC: NORMAL MG/DL
VENTRICULAR RATE- MUSE: 93 BPM
WBC # BLD AUTO: 11.4 10E3/UL (ref 4–11)
WBC URINE: 1 /HPF

## 2022-08-17 PROCEDURE — 80053 COMPREHEN METABOLIC PANEL: CPT | Performed by: FAMILY MEDICINE

## 2022-08-17 PROCEDURE — 80306 DRUG TEST PRSMV INSTRMNT: CPT | Performed by: FAMILY MEDICINE

## 2022-08-17 PROCEDURE — 99291 CRITICAL CARE FIRST HOUR: CPT | Mod: 25 | Performed by: FAMILY MEDICINE

## 2022-08-17 PROCEDURE — 36415 COLL VENOUS BLD VENIPUNCTURE: CPT | Performed by: FAMILY MEDICINE

## 2022-08-17 PROCEDURE — 71045 X-RAY EXAM CHEST 1 VIEW: CPT

## 2022-08-17 PROCEDURE — 84443 ASSAY THYROID STIM HORMONE: CPT | Performed by: FAMILY MEDICINE

## 2022-08-17 PROCEDURE — 82040 ASSAY OF SERUM ALBUMIN: CPT | Performed by: FAMILY MEDICINE

## 2022-08-17 PROCEDURE — 250N000009 HC RX 250: Performed by: FAMILY MEDICINE

## 2022-08-17 PROCEDURE — 3E033XZ INTRODUCTION OF VASOPRESSOR INTO PERIPHERAL VEIN, PERCUTANEOUS APPROACH: ICD-10-PCS | Performed by: FAMILY MEDICINE

## 2022-08-17 PROCEDURE — 82077 ASSAY SPEC XCP UR&BREATH IA: CPT | Performed by: FAMILY MEDICINE

## 2022-08-17 PROCEDURE — 83605 ASSAY OF LACTIC ACID: CPT | Performed by: FAMILY MEDICINE

## 2022-08-17 PROCEDURE — 250N000013 HC RX MED GY IP 250 OP 250 PS 637: Performed by: FAMILY MEDICINE

## 2022-08-17 PROCEDURE — 258N000003 HC RX IP 258 OP 636: Performed by: INTERNAL MEDICINE

## 2022-08-17 PROCEDURE — 83735 ASSAY OF MAGNESIUM: CPT | Performed by: FAMILY MEDICINE

## 2022-08-17 PROCEDURE — 85610 PROTHROMBIN TIME: CPT | Performed by: FAMILY MEDICINE

## 2022-08-17 PROCEDURE — G1010 CDSM STANSON: HCPCS

## 2022-08-17 PROCEDURE — 96366 THER/PROPH/DIAG IV INF ADDON: CPT | Performed by: FAMILY MEDICINE

## 2022-08-17 PROCEDURE — 83880 ASSAY OF NATRIURETIC PEPTIDE: CPT | Performed by: FAMILY MEDICINE

## 2022-08-17 PROCEDURE — 82805 BLOOD GASES W/O2 SATURATION: CPT | Performed by: FAMILY MEDICINE

## 2022-08-17 PROCEDURE — 258N000003 HC RX IP 258 OP 636: Performed by: FAMILY MEDICINE

## 2022-08-17 PROCEDURE — 200N000001 HC R&B ICU

## 2022-08-17 PROCEDURE — 84484 ASSAY OF TROPONIN QUANT: CPT | Performed by: FAMILY MEDICINE

## 2022-08-17 PROCEDURE — 87637 SARSCOV2&INF A&B&RSV AMP PRB: CPT | Performed by: FAMILY MEDICINE

## 2022-08-17 PROCEDURE — 93005 ELECTROCARDIOGRAM TRACING: CPT | Performed by: FAMILY MEDICINE

## 2022-08-17 PROCEDURE — 36600 WITHDRAWAL OF ARTERIAL BLOOD: CPT | Performed by: FAMILY MEDICINE

## 2022-08-17 PROCEDURE — 96365 THER/PROPH/DIAG IV INF INIT: CPT | Performed by: FAMILY MEDICINE

## 2022-08-17 PROCEDURE — 85025 COMPLETE CBC W/AUTO DIFF WBC: CPT | Performed by: FAMILY MEDICINE

## 2022-08-17 PROCEDURE — 250N000013 HC RX MED GY IP 250 OP 250 PS 637: Performed by: INTERNAL MEDICINE

## 2022-08-17 PROCEDURE — C9803 HOPD COVID-19 SPEC COLLECT: HCPCS | Performed by: FAMILY MEDICINE

## 2022-08-17 PROCEDURE — 81001 URINALYSIS AUTO W/SCOPE: CPT | Performed by: FAMILY MEDICINE

## 2022-08-17 PROCEDURE — 99285 EMERGENCY DEPT VISIT HI MDM: CPT | Performed by: FAMILY MEDICINE

## 2022-08-17 PROCEDURE — 99223 1ST HOSP IP/OBS HIGH 75: CPT | Mod: AI | Performed by: INTERNAL MEDICINE

## 2022-08-17 PROCEDURE — 93010 ELECTROCARDIOGRAM REPORT: CPT | Performed by: INTERNAL MEDICINE

## 2022-08-17 RX ORDER — LISINOPRIL 20 MG/1
20 TABLET ORAL DAILY
Status: ON HOLD | COMMUNITY
End: 2022-08-20

## 2022-08-17 RX ORDER — NALOXONE HYDROCHLORIDE 0.4 MG/ML
0.4 INJECTION, SOLUTION INTRAMUSCULAR; INTRAVENOUS; SUBCUTANEOUS
Status: DISCONTINUED | OUTPATIENT
Start: 2022-08-17 | End: 2022-08-20 | Stop reason: HOSPADM

## 2022-08-17 RX ORDER — SODIUM CHLORIDE 9 MG/ML
INJECTION, SOLUTION INTRAVENOUS CONTINUOUS
Status: DISCONTINUED | OUTPATIENT
Start: 2022-08-17 | End: 2022-08-18

## 2022-08-17 RX ORDER — TAMSULOSIN HYDROCHLORIDE 0.4 MG/1
0.4 CAPSULE ORAL EVERY EVENING
Status: DISCONTINUED | OUTPATIENT
Start: 2022-08-17 | End: 2022-08-20 | Stop reason: HOSPADM

## 2022-08-17 RX ORDER — ASPIRIN 81 MG/1
81 TABLET ORAL DAILY
Status: DISCONTINUED | OUTPATIENT
Start: 2022-08-18 | End: 2022-08-20 | Stop reason: HOSPADM

## 2022-08-17 RX ORDER — ACYCLOVIR 200 MG/1
400 CAPSULE ORAL 2 TIMES DAILY
Status: DISCONTINUED | OUTPATIENT
Start: 2022-08-17 | End: 2022-08-20 | Stop reason: HOSPADM

## 2022-08-17 RX ORDER — SODIUM CHLORIDE 9 MG/ML
INJECTION, SOLUTION INTRAVENOUS CONTINUOUS
Status: DISCONTINUED | OUTPATIENT
Start: 2022-08-17 | End: 2022-08-17 | Stop reason: DRUGHIGH

## 2022-08-17 RX ORDER — NALOXONE HYDROCHLORIDE 0.4 MG/ML
0.2 INJECTION, SOLUTION INTRAMUSCULAR; INTRAVENOUS; SUBCUTANEOUS
Status: DISCONTINUED | OUTPATIENT
Start: 2022-08-17 | End: 2022-08-20 | Stop reason: HOSPADM

## 2022-08-17 RX ORDER — OXYCODONE HCL 10 MG/1
10 TABLET, FILM COATED, EXTENDED RELEASE ORAL 3 TIMES DAILY
Status: DISCONTINUED | OUTPATIENT
Start: 2022-08-17 | End: 2022-08-19

## 2022-08-17 RX ORDER — ONDANSETRON 4 MG/1
4 TABLET, ORALLY DISINTEGRATING ORAL EVERY 6 HOURS PRN
Status: DISCONTINUED | OUTPATIENT
Start: 2022-08-17 | End: 2022-08-20 | Stop reason: HOSPADM

## 2022-08-17 RX ORDER — OXYCODONE HCL 10 MG/1
TABLET, FILM COATED, EXTENDED RELEASE ORAL
COMMUNITY
Start: 2022-08-10

## 2022-08-17 RX ORDER — OXYCODONE HYDROCHLORIDE 5 MG/1
5 TABLET ORAL
COMMUNITY
Start: 2022-07-25 | End: 2022-08-17

## 2022-08-17 RX ORDER — OXYCODONE HYDROCHLORIDE 5 MG/1
5 TABLET ORAL ONCE
Status: COMPLETED | OUTPATIENT
Start: 2022-08-17 | End: 2022-08-17

## 2022-08-17 RX ORDER — OXYCODONE HYDROCHLORIDE 5 MG/1
5 TABLET ORAL EVERY 4 HOURS PRN
Status: DISCONTINUED | OUTPATIENT
Start: 2022-08-17 | End: 2022-08-20 | Stop reason: HOSPADM

## 2022-08-17 RX ORDER — FOLIC ACID 1 MG/1
1 TABLET ORAL DAILY
COMMUNITY

## 2022-08-17 RX ORDER — OXYCODONE HYDROCHLORIDE 5 MG/1
TABLET ORAL
COMMUNITY
Start: 2022-07-25

## 2022-08-17 RX ORDER — LIDOCAINE 40 MG/G
CREAM TOPICAL
Status: DISCONTINUED | OUTPATIENT
Start: 2022-08-17 | End: 2022-08-20 | Stop reason: HOSPADM

## 2022-08-17 RX ORDER — DIPHENHYDRAMINE HCL 50 MG
50 CAPSULE ORAL EVERY 6 HOURS PRN
COMMUNITY
End: 2024-03-06

## 2022-08-17 RX ORDER — FOLIC ACID 1 MG/1
1 TABLET ORAL DAILY
Status: DISCONTINUED | OUTPATIENT
Start: 2022-08-11 | End: 2022-08-20 | Stop reason: HOSPADM

## 2022-08-17 RX ORDER — OXYCODONE HCL 10 MG/1
10 TABLET, FILM COATED, EXTENDED RELEASE ORAL
COMMUNITY
Start: 2022-07-25 | End: 2022-08-17

## 2022-08-17 RX ORDER — ONDANSETRON 2 MG/ML
4 INJECTION INTRAMUSCULAR; INTRAVENOUS EVERY 6 HOURS PRN
Status: DISCONTINUED | OUTPATIENT
Start: 2022-08-17 | End: 2022-08-20 | Stop reason: HOSPADM

## 2022-08-17 RX ORDER — ASPIRIN 81 MG/1
81 TABLET ORAL DAILY
Status: DISCONTINUED | OUTPATIENT
Start: 2022-08-17 | End: 2022-08-17

## 2022-08-17 RX ORDER — GABAPENTIN 300 MG/1
900 CAPSULE ORAL 3 TIMES DAILY
COMMUNITY

## 2022-08-17 RX ORDER — ROPIVACAINE IN 0.9% SOD CHL/PF 0.1 %
.03-.125 PLASTIC BAG, INJECTION (ML) EPIDURAL CONTINUOUS
Status: DISCONTINUED | OUTPATIENT
Start: 2022-08-17 | End: 2022-08-18

## 2022-08-17 RX ORDER — MULTIVITAMIN,THERAPEUTIC
1 TABLET ORAL DAILY
Status: ON HOLD | COMMUNITY
End: 2022-08-18

## 2022-08-17 RX ORDER — UBIDECARENONE 75 MG
100 CAPSULE ORAL DAILY
COMMUNITY

## 2022-08-17 RX ADMIN — OXYCODONE HYDROCHLORIDE 5 MG: 5 TABLET ORAL at 13:55

## 2022-08-17 RX ADMIN — SODIUM CHLORIDE: 9 INJECTION, SOLUTION INTRAVENOUS at 11:58

## 2022-08-17 RX ADMIN — SODIUM CHLORIDE 1000 ML: 9 INJECTION, SOLUTION INTRAVENOUS at 10:51

## 2022-08-17 RX ADMIN — ACYCLOVIR 400 MG: 200 CAPSULE ORAL at 22:11

## 2022-08-17 RX ADMIN — Medication 0.06 MCG/KG/MIN: at 11:12

## 2022-08-17 RX ADMIN — SODIUM CHLORIDE 1000 ML: 0.9 INJECTION, SOLUTION INTRAVENOUS at 17:34

## 2022-08-17 RX ADMIN — OXYCODONE HYDROCHLORIDE 10 MG: 10 TABLET, FILM COATED, EXTENDED RELEASE ORAL at 18:39

## 2022-08-17 RX ADMIN — SODIUM CHLORIDE: 9 INJECTION, SOLUTION INTRAVENOUS at 13:40

## 2022-08-17 RX ADMIN — SODIUM CHLORIDE: 9 INJECTION, SOLUTION INTRAVENOUS at 19:30

## 2022-08-17 RX ADMIN — Medication 0.03 MCG/KG/MIN: at 10:52

## 2022-08-17 RX ADMIN — SODIUM CHLORIDE: 9 INJECTION, SOLUTION INTRAVENOUS at 16:32

## 2022-08-17 RX ADMIN — SODIUM CHLORIDE 1000 ML: 9 INJECTION, SOLUTION INTRAVENOUS at 12:43

## 2022-08-17 RX ADMIN — OXYCODONE HYDROCHLORIDE 5 MG: 5 TABLET ORAL at 16:36

## 2022-08-17 ASSESSMENT — ACTIVITIES OF DAILY LIVING (ADL)
ADLS_ACUITY_SCORE: 37
ADLS_ACUITY_SCORE: 22
ADLS_ACUITY_SCORE: 22
CHANGE_IN_FUNCTIONAL_STATUS_SINCE_ONSET_OF_CURRENT_ILLNESS/INJURY: NO
DRESSING/BATHING_DIFFICULTY: NO
ADLS_ACUITY_SCORE: 37
TOILETING_ISSUES: NO
HEARING_DIFFICULTY_OR_DEAF: NO
WALKING_OR_CLIMBING_STAIRS_DIFFICULTY: NO
ADLS_ACUITY_SCORE: 22
DOING_ERRANDS_INDEPENDENTLY_DIFFICULTY: NO
CONCENTRATING,_REMEMBERING_OR_MAKING_DECISIONS_DIFFICULTY: NO
DIFFICULTY_EATING/SWALLOWING: NO
FALL_HISTORY_WITHIN_LAST_SIX_MONTHS: NO
DIFFICULTY_COMMUNICATING: NO
WEAR_GLASSES_OR_BLIND: NO
ADLS_ACUITY_SCORE: 22
ADLS_ACUITY_SCORE: 37

## 2022-08-17 NOTE — H&P
[facility]    History and Physical - Hospitalist Service       Date of Admission:  8/17/2022    Assessment & Plan      Aden Rosa is a 82 year old male admitted on 8/17/2022. He presents with a syncopal spell.     Principal Problem:    Hypovolemic shock  Assessment: present on admission. Hypovolemia and medication.   Plan: Admit. Wean norepi to off. Aggressive intravenous fluids.       Hypovolemia dehydration  Assessment: patient is on lisinopril and diltiazem, and recently started chlorthalidone for leg edema. He has CKD and poor intake at baseline.   Plan: Admit. Aggressive intravenous fluids replacement.     Active Problems:    Multiple myeloma (H)  Assessment: chronic      Stem cells transplant status (H)      Acute renal failure superimposed on stage 3 chronic kidney disease, unspecified acute renal failure type, unspecified whether stage 3a or 3b CKD (H)  Assessment: secondary to hypovolemia  Plan: intravenous fluids        Diet: Combination Diet Regular Diet Adult    DVT Prophylaxis: Pneumatic Compression Devices  Guevara Catheter: Not present  Central Lines: None  Cardiac Monitoring: None  Code Status: Full Code         The patient's care was discussed with the Patient.    Yoav Hoyt MD  Hospitalist Service  [facility]  Securely message with the Vocera Web Console (learn more here)  Text page via [employer] Paging/Directory           Chief Complaint   Unresponsive     History is obtained from the patient    History of Present Illness   Aden Rosa is a 82 year old male who arrived by EMS after becoming unresponsive at home. He was awake and active earlier in the morning and became unresponsive after breakfast. Upon arrival EMS found a blood pressure of 60/43, and started him on intravenous fluids and norepinephrine.     Upon arrival to the ED, it took some time for him to regain consciousness. He was alert and oriented when he woke up. No focal  neurological findings. He was hypotensive and received fluid boluses and remained on the norepi drip.     Recently he was in the [place] ED on 8/1 with acute kidney injury and hypovolemia. His kidney function slightly improved in follow up on 8/4 and then chlorthalidone was added to his lisinopril and diltiazem.     Review of Systems    CONSTITUTIONAL: NEGATIVE for fever, chills, change in weight  INTEGUMENTARY/SKIN: NEGATIVE for worrisome rashes, moles or lesions  EYES: NEGATIVE for vision changes or irritation  ENT/MOUTH: NEGATIVE for ear, mouth and throat problems  RESP: NEGATIVE for significant cough or SOB  BREAST: NEGATIVE for masses, tenderness or discharge  CV: NEGATIVE for chest pain, palpitations or peripheral edema  GI: NEGATIVE for nausea, abdominal pain, heartburn, or change in bowel habits  : NEGATIVE for frequency, dysuria, or hematuria  MUSCULOSKELETAL: NEGATIVE for significant arthralgias or myalgia  NEURO: weakness generalized  ENDOCRINE: NEGATIVE for temperature intolerance, skin/hair changes  HEME: NEGATIVE for bleeding problems  PSYCHIATRIC: NEGATIVE for changes in mood or affect    Past Medical History    I have reviewed this patient's medical history and updated it with pertinent information if needed.   Past Medical History:   Diagnosis Date     Hypertension      Kidney stones      Multiple myeloma (H) 2010     Spinal stenosis     with chronic back pain     Stem cells transplant status (H) 2011    [facility]        Past Surgical History   I have reviewed this patient's surgical history and updated it with pertinent information if needed.  Past Surgical History:   Procedure Laterality Date     BACK SURGERY  2010    Microsurgery     C TOTAL KNEE ARTHROPLASTY Right      CHOLECYSTECTOMY       HEMORRHOIDECTOMY      x2       Social History   I have reviewed this patient's social history and updated it with pertinent information if needed.  Social History     Tobacco Use     Smoking status: Never  Smoker     Smokeless tobacco: Never Used   Substance Use Topics     Alcohol use: No     Drug use: No       Family History   I have reviewed this patient's family history and updated it with pertinent information if needed.  Family History   Problem Relation Age of Onset     Cancer Other         hodgkins     Diabetes Other      Heart Disease Father 82        disease     Other - See Comments Other      Alzheimer Disease Mother          at 97     Diabetes Type 2  Sister        Prior to Admission Medications   Prior to Admission Medications   Prescriptions Last Dose Informant Patient Reported? Taking?   FERROUS SULFATE PO   Yes No   Sig: Take 325 mg by mouth daily   acyclovir (ZOVIRAX) 400 MG tablet   Yes No   Sig: Take 400 mg by mouth 2 times daily At 7am and 7pm   aspirin 81 MG EC tablet   Yes No   Sig: Take 81 mg by mouth daily   cyanocobalamin (VITAMIN B-12) 100 MCG tablet   Yes Yes   Sig: Take 100 mcg by mouth daily   diltiazem ER (DILT-XR) 120 MG 24 hr capsule   Yes No   Sig: Take 120 mg by mouth daily   diphenhydrAMINE (BENADRYL) 50 MG capsule   Yes Yes   Sig: Take 50 mg by mouth every 6 hours as needed for itching or allergies   diphenoxylate-atropine (LOMOTIL) 2.5-0.025 MG tablet   Yes No   Sig: Take 1 tablet by mouth 4 times daily as needed   folic acid (FOLVITE) 1 MG tablet   Yes Yes   Sig: Take 1 mg by mouth daily   gabapentin (NEURONTIN) 300 MG capsule   Yes Yes   Sig: Take 900 mg by mouth 3 times daily   lisinopril (ZESTRIL) 20 MG tablet   Yes Yes   Sig: Take 20 mg by mouth daily   loperamide (IMODIUM A-D) 2 MG tablet   Yes No   Sig: Take 2 mg by mouth 3 times daily as needed   multivitamin, therapeutic (THERA-VIT) TABS tablet   Yes Yes   Sig: Take 1 tablet by mouth daily   omeprazole 20 MG tablet   Yes Yes   Sig: Take 20 mg by mouth daily   oxyCODONE (OXYCONTIN) 10 MG 12 hr tablet   Yes No   Sig: Per [facility], takes 2 tabs at 7 AM, 1 tab at Noon, and 1 tab at 7 PM.   oxyCODONE (ROXICODONE) 5  MG tablet   Yes No   Sig: TAKE 1 TABLET BY MOUTH EVERY 6 HOURS AS NEEDED FOR PAIN   tamsulosin (FLOMAX) 0.4 MG capsule   Yes No   Sig: Take 0.4 mg by mouth every evening at 7pm      Facility-Administered Medications: None     Allergies   Allergies   Allergen Reactions     Dristan      Hydrocodone-Homatropine      Unable to void     Hydromet [Hydrocodone W/Homatropine]      Prochlorperazine      Prochlorperazine Maleate      Compazine         Physical Exam   Vital Signs: Temp: 98  F (36.7  C) Temp src: Temporal BP: 137/49 Pulse: 81   Resp: 20 SpO2: 95 % O2 Device: Nasal cannula Oxygen Delivery: 2 LPM  Weight: 208 lbs 5.36 oz    GENERAL: Comfortable, talkative, in no apparent distress.  HEENT: Anicteric, non-injected sclera, mouth moist.   NECK: No JVD.  CARDIOVASCULAR: regular rate and rhythm, 2/6 systolic murmur. 2+ lower extremity edema   RESPIRATORY: Clear to auscultation bilaterally, no wheezes, no crackles.  GI: Non-distended, normal bowel sounds, soft, non-tender.  SKIN: No rashes, sores.   NEUROLOGY: Alert and oriented x3, follows commands, speech and language normal.       Data   Data reviewed today: I reviewed all medications, new labs and imaging results over the last 24 hours. I personally reviewed the head CT image(s) showing no bleed, lytic skull lesions cw multiple myeloma. .    Recent Labs   Lab 08/17/22  1122   WBC 11.4*   HGB 9.4*   *   *   INR 1.21*      POTASSIUM 3.9   CHLORIDE 104   CO2 22   BUN 32*   CR 3.31*   ANIONGAP 9   OLAYINKA 8.8   *   ALBUMIN 3.1*   PROTTOTAL 6.3*   BILITOTAL 1.0   ALKPHOS 52   ALT 19   AST 41*     Recent Results (from the past 24 hour(s))   XR Chest Port 1 View    Narrative    PROCEDURE: XR CHEST PORT 1 VIEW 8/17/2022 10:48 AM    HISTORY: unresponsive    COMPARISONS: 10/29/2019.    TECHNIQUE: Single portable view.    FINDINGS: Level of inspiration is quite low. Heart appears enlarged  but is probably similar to the prior exam. There is some  abnormal  increased density at the right lung base, probably due to atelectasis  given low level of inspiration. It does appear different than on the  prior exam. No definite infiltrate is seen on the left. No definite  effusion is seen.         Impression    IMPRESSION: Probable atelectasis at the right lung base. Focal  pneumonia is difficult to exclude.    Changes at the left lung base appear more chronic.    MANDA WAGNER MD         SYSTEM ID:  [id number]   CT Head w/o Contrast    Narrative    EXAM: CT HEAD W/O CONTRAST 8/17/2022 11:43 AM    PROVIDED HISTORY: change in mental status- known stage 4 melanoma,  concern for metastatic disease to brain    COMPARISON: Bone scan 7/1/2010    TECHNIQUE:   Imaging protocol: Multiplanar CT images of the head without  intravenous contrast.   Acquisition: This CT exam was performed using one or more the  following dose reduction techniques: automated exposure control,  adjustment of the mA and/or kV according to patient size, and/or  iterative reconstruction technique.    FINDINGS:  No intracranial hemorrhage, mass effect, or midline shift. The  ventricles are proportionate to the cerebral sulci. Scattered patchy  foci of hypodensity in the periventricular and supraventricular white  matter, which is nonspecific, likely related to chronic small vessel  ischemic disease given the patient's age. Mild general parenchymal  volume loss. No acute loss of gray-white matter differentiation.    No acute osseous abnormality. Multifocal lytic lesions throughout the  calvarium, including 2.3 x 2.2 cm lesion in the left parietal lobe  (series 3 image 29), new since comparison bone scan 7/1/2010. Mild  paranasal sinus mucosal thickening. Mastoid air cells are clear. The  orbits are grossly unremarkable.       Impression    IMPRESSION:  1.  No acute intracranial abnormality or intracranial mass lesion.  2.  Multifocal lytic calvarial lesions, concerning for metastatic  disease.    ERNESTO  MD RIVERA         SYSTEM ID:  [id number]

## 2022-08-17 NOTE — PHARMACY-ADMISSION MEDICATION HISTORY
Pharmacy -- Admission Medication Reconciliation IN PROCESS    Prior to admission (PTA) medications were reviewed and the patient's PTA medication list was updated.    Sources Consulted: spouse Amanda phone interview, chart review, Farideh mckenzie technician at [facility] phone interview    **per Farideh,  An rx for chlorthalidone 12.5 mg daily was sent to the pharmacy on 8/7.  This was a new rx, [facility] had never filled it before.  Per spouse Amanda, Dr Langley started patient on chlorthalidone last week due to his swollen feet.  She also verified it was a new rx for him.      **spouse will bring in pill box/bottles of medications to verify what patient has been taking at home as patient/spouse are unaware (see previous med rec note).       Randa Rubio [facility], 8/17/2022,  5:23 PM

## 2022-08-17 NOTE — ED TRIAGE NOTES
"Patient presents to ER via EMS after unresponsive episode at home. /60   Pulse 93   Temp 100  F (37.8  C) (Tympanic)   Resp 16   Ht 1.702 m (5' 7\")   Wt 94 kg (207 lb 3.7 oz)   SpO2 97%   BMI 32.46 kg/m         Triage Assessment     Row Name 08/17/22 1043       Triage Assessment (Adult)    Airway WDL WDL       Respiratory WDL    Respiratory WDL WDL       Skin Circulation/Temperature WDL    Skin Circulation/Temperature WDL X;circulation    Skin Circulation cyanosis;pallor       Cardiac WDL    Cardiac WDL WDL;X;rhythm    Cardiac Rhythm apical pulse regular;radial pulse regular              "

## 2022-08-17 NOTE — PROGRESS NOTES
:    Patient lives home with his wife.    Met with patient in room regarding possible medication set up at discharge.    Kirill stated he has been doing it by himself for 11 years now.    Patient sated he had a med box at home and patient sets them up once a week.    Patient is being admitted to ICU.  Maybe patient would be willing to have someone  come in private pay to assist with mediation management or maybe home care services for nursing and another therapy if there is a need could assist for awhile.    FLORIDALMA Mai on 8/17/2022 at 4:12 PM

## 2022-08-17 NOTE — PHARMACY-ADMISSION MEDICATION HISTORY
Pharmacy --  Medication Reconciliation    Prior to admission (PTA) medications were reviewed and the patient's PTA medication list was updated.    Sources Consulted: [facility] Med List, Patient Interview and [employer]    The reliability of this Medication Reconciliation is: Reliability: Reliable    The following significant changes were made:    Removed Amitriptyline and Potassium- not on [facility] List and No Recent Fills    Clarified dosing on Lomotil, Lisinopril, Ferrous Sulfate, Gabapentin, Omeprazole, and Oxycontin.    Removed duplicate Oxycodone and Oxycontin RXs.     Chlorathalidone has a recent fill however not on patient list due to dehydration issues at [facility] presumably- communicated to provider.     Communicated findings to nurse and updated med list.     Spoke with patient and patient's wife- they are able to speak to very few of the patients medications other than the Oxycodone and Gabapentin. Concern regarding ability to adapt to medication changes due to changing renal function and dehydration.     Would recommend that  be consulted for med set-up. Would recommend that diltiazem and lisinopril be held due to hypotension and patient review home supply and make sure he is not taking chlorthalidone. Asked if someone could go home and get his med box and they both said no.     Lon Noel [facility], 8/17/2022,  1:18 PM

## 2022-08-17 NOTE — ED PROVIDER NOTES
History     Chief Complaint   Patient presents with     Syncope     The history is provided by the patient, the EMS personnel and medical records.     Aden Rosa is a 82 year old male here by EMS.  He arrived unresponsive.     Per EMS:  His wife said that at 0700 he was unresponsive. She wanted to wait a bit and by 1000 he was still unresponsive so she called 911. She told them that he has stage 4 melanoma but that he wants everything done. Typically he is walking and talking and interactive. VS for EMS showed BP 60/43, oxygen saturation was 86% on his home oxygen at 2 L by NC so they switched him to NRB mask, FSBS 150. They were giving LR as a bolus and had levophed running at 8 mcg/kg/minute.  He has oxycodone on his medication list. He was able to tell them his name and squeeze their hand but otherwise was unresponsive.    He has a history of multiple myeloma (stem cell transplant, on acyclovir,Neurontin, oxycodone), HTN (on diltiazem, lisinopril), stage 3 CKD, reflux (on omeprazole). He also take potassium, Flomax, aspirin.  He is V/B against COVID.     Allergies:  Allergies   Allergen Reactions     Dristan      Hydrocodone-Homatropine      Unable to void     Hydromet [Hydrocodone W/Homatropine]      Prochlorperazine      Prochlorperazine Maleate      Compazine         Problem List:    Patient Active Problem List    Diagnosis Date Noted     Acute renal failure superimposed on stage 3 chronic kidney disease (H) 08/13/2019     Priority: Medium     CAP (community acquired pneumonia) 08/10/2019     Priority: Medium     Community acquired pneumonia of right lower lobe of lung 03/31/2019     Priority: Medium     SIRS (systemic inflammatory response syndrome) (H) 03/31/2019     Priority: Medium     Sepsis due to pneumonia (H) 03/31/2019     Priority: Medium     Kidney stones      Priority: Medium     Spinal stenosis      Priority: Medium     with chronic back pain       Hypertension      Priority: Medium      Multiple myeloma (H) 2011     Priority: Medium     updating diagnosis code for icd10 cutover       Encounter for long-term current use of medication 2011     Priority: Medium     updating diagnosis code for icd10 cutover       Stem cells transplant status (H) 2011     Priority: Medium     [facility]           Past Medical History:    Past Medical History:   Diagnosis Date     Hypertension      Kidney stones      Multiple myeloma (H)      Spinal stenosis      Stem cells transplant status (H)        Past Surgical History:    Past Surgical History:   Procedure Laterality Date     BACK SURGERY  2010    Microsurgery     C TOTAL KNEE ARTHROPLASTY Right      CHOLECYSTECTOMY       HEMORRHOIDECTOMY      x2       Family History:    Family History   Problem Relation Age of Onset     Cancer Other         hodgkins     Diabetes Other      Heart Disease Father 82        disease     Other - See Comments Other      Alzheimer Disease Mother          at 97     Diabetes Type 2  Sister        Social History:  Marital Status:   [2]  Social History     Tobacco Use     Smoking status: Never Smoker     Smokeless tobacco: Never Used   Substance Use Topics     Alcohol use: No     Drug use: No        Medications:    cyanocobalamin (VITAMIN B-12) 100 MCG tablet  diphenhydrAMINE (BENADRYL) 50 MG capsule  folic acid (FOLVITE) 1 MG tablet  gabapentin (NEURONTIN) 300 MG capsule  lisinopril (ZESTRIL) 20 MG tablet  multivitamin, therapeutic (THERA-VIT) TABS tablet  omeprazole 20 MG tablet  acyclovir (ZOVIRAX) 400 MG tablet  aspirin 81 MG EC tablet  diltiazem ER (DILT-XR) 120 MG 24 hr capsule  diphenoxylate-atropine (LOMOTIL) 2.5-0.025 MG tablet  FERROUS SULFATE PO  loperamide (IMODIUM A-D) 2 MG tablet  oxyCODONE (OXYCONTIN) 10 MG 12 hr tablet  oxyCODONE (ROXICODONE) 5 MG tablet  tamsulosin (FLOMAX) 0.4 MG capsule          Review of Systems   Unable to perform ROS: Acuity of condition       Physical Exam   BP:  "114/60  Pulse: 93  Temp: 100  F (37.8  C)  Resp: 16  Height: 170.2 cm (5' 7\")  Weight: 94 kg (207 lb 3.7 oz)  SpO2: 97 %    Physical Exam  Vitals and nursing note reviewed.   Constitutional:       Appearance: He is ill-appearing.      Comments: Non-responsive on arrival   HENT:      Head: Normocephalic and atraumatic.      Right Ear: External ear normal.      Left Ear: External ear normal.      Nose: Nose normal.   Cardiovascular:      Rate and Rhythm: Normal rate and regular rhythm.      Pulses: Normal pulses.      Heart sounds: Normal heart sounds. No murmur heard.  Pulmonary:      Effort: Pulmonary effort is normal. No respiratory distress.      Breath sounds: Normal breath sounds.   Abdominal:      General: Bowel sounds are normal.      Palpations: Abdomen is soft.   Musculoskeletal:         General: No swelling or tenderness.      Right lower leg: Edema present.      Left lower leg: Edema present.   Skin:     General: Skin is warm and dry.         EKG: NSR with rate 93, axis -13 , no ST segment abnormalities    Results for orders placed or performed during the hospital encounter of 08/17/22 (from the past 24 hour(s))   XR Chest Port 1 View    Narrative    PROCEDURE: XR CHEST PORT 1 VIEW 8/17/2022 10:48 AM    HISTORY: unresponsive    COMPARISONS: 10/29/2019.    TECHNIQUE: Single portable view.    FINDINGS: Level of inspiration is quite low. Heart appears enlarged  but is probably similar to the prior exam. There is some abnormal  increased density at the right lung base, probably due to atelectasis  given low level of inspiration. It does appear different than on the  prior exam. No definite infiltrate is seen on the left. No definite  effusion is seen.         Impression    IMPRESSION: Probable atelectasis at the right lung base. Focal  pneumonia is difficult to exclude.    Changes at the left lung base appear more chronic.    MANDA WAGNER MD         SYSTEM ID:  [id number]   Asymptomatic Influenza A/B & " SARS-CoV2 (COVID-19) Virus PCR Multiplex Nose    Specimen: Nose; Swab   Result Value Ref Range    Influenza A PCR Negative Negative    Influenza B PCR Negative Negative    RSV PCR Negative Negative    SARS CoV2 PCR Negative Negative    Narrative    Testing was performed using the Xpert Xpress CoV2/Flu/RSV Assay on the Cepheid GeneXpert Instrument. This test should be ordered for the detection of SARS-CoV-2 and influenza viruses in individuals who meet clinical and/or epidemiological criteria. Test performance is unknown in asymptomatic patients. This test is for in vitro diagnostic use under the FDA EUA for laboratories certified under CLIA to perform high or moderate complexity testing. This test has not been FDA cleared or approved. A negative result does not rule out the presence of PCR inhibitors in the specimen or target RNA in concentration below the limit of detection for the assay. If only one viral target is positive but coinfection with multiple targets is suspected, the sample should be re-tested with another FDA cleared, approved, or authorized test, if coinfection would change clinical management. This test was validated by the [facility]. These laboratories are certified under the Clinical  Laboratory Improvement Amendments of 1988 (CLIA-88) as qualified to perform high complexity laboratory testing.   CBC with platelets differential    Narrative    The following orders were created for panel order CBC with platelets differential.  Procedure                               Abnormality         Status                     ---------                               -----------         ------                     CBC with platelets and d...[[id number]]  Abnormal            Final result                 Please view results for these tests on the individual orders.   INR   Result Value Ref Range    INR 1.21 (H) 0.85 - 1.15   Comprehensive metabolic panel   Result Value Ref Range    Sodium 135 134  - 144 mmol/L    Potassium 3.9 3.5 - 5.1 mmol/L    Chloride 104 98 - 107 mmol/L    Carbon Dioxide (CO2) 22 21 - 31 mmol/L    Anion Gap 9 3 - 14 mmol/L    Urea Nitrogen 32 (H) 7 - 25 mg/dL    Creatinine 3.31 (H) 0.70 - 1.30 mg/dL    Calcium 8.8 8.6 - 10.3 mg/dL    Glucose 126 (H) 70 - 105 mg/dL    Alkaline Phosphatase 52 34 - 104 U/L    AST 41 (H) 13 - 39 U/L    ALT 19 7 - 52 U/L    Protein Total 6.3 (L) 6.4 - 8.9 g/dL    Albumin 3.1 (L) 3.5 - 5.7 g/dL    Bilirubin Total 1.0 0.3 - 1.0 mg/dL    GFR Estimate 18 (L) >60 mL/min/1.73m2   Lactic acid whole blood   Result Value Ref Range    Lactic Acid 2.0 0.7 - 2.0 mmol/L   Troponin I   Result Value Ref Range    Troponin I 42.1 (H) 0.0 - 34.0 pg/mL   Magnesium   Result Value Ref Range    Magnesium 1.9 1.9 - 2.7 mg/dL   TSH Reflex GH   Result Value Ref Range    TSH 0.47 0.40 - 4.00 mU/L   Nt probnp inpatient (BNP)   Result Value Ref Range    N terminal Pro BNP Inpatient 108 (H) 0 - 100 pg/mL   Ethanol GH   Result Value Ref Range    Alcohol ethyl <0.01 <=0.01 g/dL   Blood gas venous and oxyhgb   Result Value Ref Range    pH Venous 7.33 7.32 - 7.43    pCO2 Venous 41 40 - 50 mm Hg    pO2 Venous 147 (H) 25 - 47 mm Hg    Bicarbonate Venous 21 21 - 28 mmol/L    FIO2 28     Oxyhemoglobin Venous 97 (H) 70 - 75 %    Base Excess/Deficit (+/-) -4.3 -7.7 - 1.9 mmol/L   CBC with platelets and differential   Result Value Ref Range    WBC Count 11.4 (H) 4.0 - 11.0 10e3/uL    RBC Count 2.76 (L) 4.40 - 5.90 10e6/uL    Hemoglobin 9.4 (L) 13.3 - 17.7 g/dL    Hematocrit 28.0 (L) 40.0 - 53.0 %     (H) 78 - 100 fL    MCH 34.1 (H) 26.5 - 33.0 pg    MCHC 33.6 31.5 - 36.5 g/dL    RDW 14.7 10.0 - 15.0 %    Platelet Count 149 (L) 150 - 450 10e3/uL    % Neutrophils 85 %    % Lymphocytes 4 %    % Monocytes 11 %    % Eosinophils 0 %    % Basophils 0 %    % Immature Granulocytes 0 %    NRBCs per 100 WBC 0 <1 /100    Absolute Neutrophils 9.5 (H) 1.6 - 8.3 10e3/uL    Absolute Lymphocytes 0.5 (L) 0.8  - 5.3 10e3/uL    Absolute Monocytes 1.3 0.0 - 1.3 10e3/uL    Absolute Eosinophils 0.0 0.0 - 0.7 10e3/uL    Absolute Basophils 0.0 0.0 - 0.2 10e3/uL    Absolute Immature Granulocytes 0.1 <=0.4 10e3/uL    Absolute NRBCs 0.0 10e3/uL   CT Head w/o Contrast    Narrative    EXAM: CT HEAD W/O CONTRAST 8/17/2022 11:43 AM    PROVIDED HISTORY: change in mental status- known stage 4 melanoma,  concern for metastatic disease to brain    COMPARISON: Bone scan 7/1/2010    TECHNIQUE:   Imaging protocol: Multiplanar CT images of the head without  intravenous contrast.   Acquisition: This CT exam was performed using one or more the  following dose reduction techniques: automated exposure control,  adjustment of the mA and/or kV according to patient size, and/or  iterative reconstruction technique.    FINDINGS:  No intracranial hemorrhage, mass effect, or midline shift. The  ventricles are proportionate to the cerebral sulci. Scattered patchy  foci of hypodensity in the periventricular and supraventricular white  matter, which is nonspecific, likely related to chronic small vessel  ischemic disease given the patient's age. Mild general parenchymal  volume loss. No acute loss of gray-white matter differentiation.    No acute osseous abnormality. Multifocal lytic lesions throughout the  calvarium, including 2.3 x 2.2 cm lesion in the left parietal lobe  (series 3 image 29), new since comparison bone scan 7/1/2010. Mild  paranasal sinus mucosal thickening. Mastoid air cells are clear. The  orbits are grossly unremarkable.       Impression    IMPRESSION:  1.  No acute intracranial abnormality or intracranial mass lesion.  2.  Multifocal lytic calvarial lesions, concerning for metastatic  disease.    ERNESTO STAFFORD MD         SYSTEM ID:  [id number]   Troponin I   Result Value Ref Range    Troponin I 34.2 (H) 0.0 - 34.0 pg/mL   Urine Drugs of Abuse Screen    Narrative    The following orders were created for panel order Urine Drugs of Abuse  Screen.  Procedure                               Abnormality         Status                     ---------                               -----------         ------                     Urine Drugs of Abuse Scr...[[id number]]  Abnormal            Final result                 Please view results for these tests on the individual orders.   UA with Microscopic reflex to Culture    Specimen: Urine, Clean Catch   Result Value Ref Range    Color Urine Light Yellow Colorless, Straw, Light Yellow, Yellow    Appearance Urine Clear Clear    Glucose Urine Negative Negative mg/dL    Bilirubin Urine Negative Negative    Ketones Urine Negative Negative mg/dL    Specific Gravity Urine 1.012 1.000 - 1.030    Blood Urine Negative Negative    pH Urine 5.0 5.0 - 9.0    Protein Albumin Urine Negative Negative mg/dL    Urobilinogen Urine Normal Normal, 2.0 mg/dL    Nitrite Urine Negative Negative    Leukocyte Esterase Urine Negative Negative    Mucus Urine Present (A) None Seen /LPF    RBC Urine 1 <=2 /HPF    WBC Urine 1 <=5 /HPF    Hyaline Casts Urine 3 (H) <=2 /LPF    Narrative    Urine Culture not indicated   Urine Drugs of Abuse Screen Panel 13   Result Value Ref Range    Cannabinoids (56-wrx-8-carboxy-9-THC) Not Detected Not Detected    Phencyclidine Not Detected Not Detected    Cocaine (Benzoylecgonine) Not Detected Not Detected    Methamphetamine (d-Methamphetamine) Not Detected Not Detected    Opiates (Morphine) Presumptive positive-Unconfirmed result (A) Not Detected    Amphetamine (d-Amphetamine) Not Detected Not Detected    Benzodiazepines (Nordiazepam) Not Detected Not Detected    Tricyclic Antidepressants (Desipramine) Not Detected Not Detected    Methadone Not Detected Not Detected    Barbiturates (Butalbital) Not Detected Not Detected    Oxycodone Presumptive positive-Unconfirmed result (A) Not Detected    Propoxyphene (Norpropoxyphene) Not Detected Not Detected    Buprenorphine Not Detected Not Detected       Medications  "  norepinephrine (LEVOPHED) 4 mg in  mL PERIPHERAL infusion (0 mcg/kg/min × 94 kg Intravenous Stopped 8/17/22 1449)   sodium chloride 0.9% infusion ( Intravenous New Bag 8/17/22 1340)   0.9% sodium chloride BOLUS (0 mLs Intravenous Stopped 8/17/22 1156)   0.9% sodium chloride BOLUS (0 mLs Intravenous Stopped 8/17/22 1348)   oxyCODONE (ROXICODONE) tablet 5 mg (5 mg Oral Given 8/17/22 1355)       Assessments & Plan (with Medical Decision Making)  Aden Rosa is a 82 year old male here by EMS.  He arrived unresponsive.  Per EMS:  His wife said that at 0700 he was unresponsive. She wanted to wait a bit and by 1000 he was still unresponsive so she called 911. She told them that he has stage 4 melanoma but that he wants everything done. Typically he is walking and talking and interactive. VS for EMS showed BP 60/43, oxygen saturation was 86% on his home oxygen at 2 L by NC so they switched him to NRB mask, FSBS 150. They were giving LR as a bolus and had levophed running at 8 mcg/kg/minute.  He has oxycodone on his medication list. He was able to tell them his name and squeeze their hand but otherwise was unresponsive.  He has a history of multiple myeloma (stem cell transplant, on acyclovir,Neurontin, oxycodone), HTN (on diltiazem, lisinopril), stage 3 CKD, reflux (on omeprazole). He also take potassium, Flomax, aspirin.  He is V/B against COVID.  VS in the ED on 4 L by NC /46   Pulse 85   Temp 100  F (37.8  C) (Tympanic)   Resp 16   Ht 1.702 m (5' 7\")   Wt 94 kg (207 lb 3.7 oz)   SpO2 94%   BMI 32.46 kg/m    Exam shows an unresponsive 81 yo male with good pulses, normal heart and lung sounds, some LE edema.  EKG shows NSR with rate 93, left axis deviation of -13 , no ST abnormalities. We started a second liter of NS. Labs ordered, CT head ordered, chest xray ordered.   10:35 AM  He is awake and alert and talkative. He denies any pain at this time. He can tell me where he lives but does not know " how he got to the ED. Exam was repeated and he has no abdominal tenderness.  BP has improved to 107/52.   Labs show CBC with WBC 11,400, hgb 9.4 (stable), platelets 149,000, CMP with Cr 3.31, glucose 126, AT 41, protein and albumin low, BNP  108, Mg normal, lactic acid normal, INR 1.21, VBG with pO2 147, otherwise normal, ethanol zero, TSH normal, troponin 42, UA negative, UDS positive for oxycodone, 4 Plex negative.  Chest xray shows probable atelectasis in the right lung base, pneumonia is possible.  Head CT shows no acute intracranial abnormality or intracranial mass lesion but he has multifocal lytic calvarial lesions, concerning for metastatic disease.  These appear to be new compared to his last imaging July 2010.   1:04 PM   He is more awake and alert at this time. He is still on levophed.   1:50 PM  Repeat troponin is down at 34. He is still on levophed and we are weaning that down.  1:59 PM  Nursing and pharmacy did a medication reconciliation with the patient. He has been taking   It is likely that these medications were building up in his system due to poor renal function. I did review the chart and chlorthalidone was started August 7, 2022 by Dr Langley. His BP now is 127/54 and levophed is 4 mcg/min/kg at this time. We did bladder scan him and he has 400 mL in the bladder.    3:06 PM  He is off the levophed and Dr Hoyt will take him to the floor.      I have reviewed the nursing notes.    Final diagnoses:   Acute renal failure superimposed on stage 3 chronic kidney disease, unspecified acute renal failure type, unspecified whether stage 3a or 3b CKD (H)   Hypotension, unspecified hypotension type       8/17/2022   [facility] AND [facility], Phillip Kendrick MD  08/17/22 9695

## 2022-08-18 ENCOUNTER — APPOINTMENT (OUTPATIENT)
Dept: OCCUPATIONAL THERAPY | Facility: OTHER | Age: 82
DRG: 682 | End: 2022-08-18
Attending: INTERNAL MEDICINE
Payer: MEDICARE

## 2022-08-18 ENCOUNTER — APPOINTMENT (OUTPATIENT)
Dept: PHYSICAL THERAPY | Facility: OTHER | Age: 82
DRG: 682 | End: 2022-08-18
Attending: INTERNAL MEDICINE
Payer: MEDICARE

## 2022-08-18 LAB
ALBUMIN SERPL-MCNC: 2.8 G/DL (ref 3.5–5.7)
ALP SERPL-CCNC: 45 U/L (ref 34–104)
ALT SERPL W P-5'-P-CCNC: 17 U/L (ref 7–52)
ANION GAP SERPL CALCULATED.3IONS-SCNC: 7 MMOL/L (ref 3–14)
AST SERPL W P-5'-P-CCNC: 24 U/L (ref 13–39)
BILIRUB SERPL-MCNC: 0.8 MG/DL (ref 0.3–1)
BUN SERPL-MCNC: 31 MG/DL (ref 7–25)
CALCIUM SERPL-MCNC: 8.2 MG/DL (ref 8.6–10.3)
CHLORIDE BLD-SCNC: 108 MMOL/L (ref 98–107)
CO2 SERPL-SCNC: 21 MMOL/L (ref 21–31)
CREAT SERPL-MCNC: 2.15 MG/DL (ref 0.7–1.3)
ERYTHROCYTE [DISTWIDTH] IN BLOOD BY AUTOMATED COUNT: 14.7 % (ref 10–15)
GFR SERPL CREATININE-BSD FRML MDRD: 30 ML/MIN/1.73M2
GLUCOSE BLD-MCNC: 92 MG/DL (ref 70–105)
HCT VFR BLD AUTO: 25.7 % (ref 40–53)
HGB BLD-MCNC: 8.5 G/DL (ref 13.3–17.7)
MCH RBC QN AUTO: 34.3 PG (ref 26.5–33)
MCHC RBC AUTO-ENTMCNC: 33.1 G/DL (ref 31.5–36.5)
MCV RBC AUTO: 104 FL (ref 78–100)
PLATELET # BLD AUTO: 105 10E3/UL (ref 150–450)
POTASSIUM BLD-SCNC: 4.3 MMOL/L (ref 3.5–5.1)
PROT SERPL-MCNC: 6.1 G/DL (ref 6.4–8.9)
RBC # BLD AUTO: 2.48 10E6/UL (ref 4.4–5.9)
SODIUM SERPL-SCNC: 136 MMOL/L (ref 134–144)
WBC # BLD AUTO: 13.5 10E3/UL (ref 4–11)

## 2022-08-18 PROCEDURE — 99232 SBSQ HOSP IP/OBS MODERATE 35: CPT | Performed by: INTERNAL MEDICINE

## 2022-08-18 PROCEDURE — 258N000003 HC RX IP 258 OP 636: Performed by: INTERNAL MEDICINE

## 2022-08-18 PROCEDURE — 120N000001 HC R&B MED SURG/OB

## 2022-08-18 PROCEDURE — 97165 OT EVAL LOW COMPLEX 30 MIN: CPT | Mod: GO | Performed by: OCCUPATIONAL THERAPIST

## 2022-08-18 PROCEDURE — 97116 GAIT TRAINING THERAPY: CPT | Mod: GP

## 2022-08-18 PROCEDURE — 250N000013 HC RX MED GY IP 250 OP 250 PS 637: Performed by: INTERNAL MEDICINE

## 2022-08-18 PROCEDURE — 80053 COMPREHEN METABOLIC PANEL: CPT | Performed by: INTERNAL MEDICINE

## 2022-08-18 PROCEDURE — 85027 COMPLETE CBC AUTOMATED: CPT | Performed by: INTERNAL MEDICINE

## 2022-08-18 PROCEDURE — 82040 ASSAY OF SERUM ALBUMIN: CPT | Performed by: INTERNAL MEDICINE

## 2022-08-18 PROCEDURE — 97161 PT EVAL LOW COMPLEX 20 MIN: CPT | Mod: GP

## 2022-08-18 PROCEDURE — 36415 COLL VENOUS BLD VENIPUNCTURE: CPT | Performed by: INTERNAL MEDICINE

## 2022-08-18 PROCEDURE — 97530 THERAPEUTIC ACTIVITIES: CPT | Mod: GO | Performed by: OCCUPATIONAL THERAPIST

## 2022-08-18 RX ORDER — CHLORTHALIDONE 25 MG/1
12.5 TABLET ORAL DAILY
COMMUNITY
End: 2022-08-20

## 2022-08-18 RX ORDER — ASPIRIN 81 MG/1
81 TABLET, CHEWABLE ORAL DAILY
COMMUNITY

## 2022-08-18 RX ORDER — MAGNESIUM HYDROXIDE/ALUMINUM HYDROXICE/SIMETHICONE 120; 1200; 1200 MG/30ML; MG/30ML; MG/30ML
30 SUSPENSION ORAL 2 TIMES DAILY PRN
Status: DISCONTINUED | OUTPATIENT
Start: 2022-08-18 | End: 2022-08-20 | Stop reason: HOSPADM

## 2022-08-18 RX ORDER — CALCIUM CARBONATE 500(1250)
500 TABLET,CHEWABLE ORAL DAILY
COMMUNITY
End: 2024-03-06

## 2022-08-18 RX ADMIN — ACYCLOVIR 400 MG: 200 CAPSULE ORAL at 21:47

## 2022-08-18 RX ADMIN — OXYCODONE HYDROCHLORIDE 10 MG: 10 TABLET, FILM COATED, EXTENDED RELEASE ORAL at 11:59

## 2022-08-18 RX ADMIN — ACYCLOVIR 400 MG: 200 CAPSULE ORAL at 09:23

## 2022-08-18 RX ADMIN — SODIUM CHLORIDE: 9 INJECTION, SOLUTION INTRAVENOUS at 05:18

## 2022-08-18 RX ADMIN — FOLIC ACID 1 MG: 1 TABLET ORAL at 09:23

## 2022-08-18 RX ADMIN — TAMSULOSIN HYDROCHLORIDE 0.4 MG: 0.4 CAPSULE ORAL at 21:47

## 2022-08-18 RX ADMIN — OXYCODONE HYDROCHLORIDE 10 MG: 10 TABLET, FILM COATED, EXTENDED RELEASE ORAL at 07:04

## 2022-08-18 RX ADMIN — ASPIRIN 81 MG: 81 TABLET, COATED ORAL at 09:23

## 2022-08-18 RX ADMIN — OXYCODONE HYDROCHLORIDE 10 MG: 10 TABLET, FILM COATED, EXTENDED RELEASE ORAL at 19:38

## 2022-08-18 ASSESSMENT — ACTIVITIES OF DAILY LIVING (ADL)
ADLS_ACUITY_SCORE: 22

## 2022-08-18 NOTE — PROGRESS NOTES
[facility]    Medicine Progress Note - Hospitalist Service    Date of Admission:  8/17/2022    Assessment & Plan      Aden Rosa is a 82 year old male admitted on 8/17/2022. He presents with a syncopal spell.     Principal Problem:    Hypovolemic shock  Assessment: present on admission. Hypovolemia and medication. Improved overnight.  Off norepi. Improved renal function.   Plan: Transfer to medical floor. Saline lock and monitor.       Hypovolemia dehydration  Assessment: patient is on lisinopril and diltiazem, and recently started chlorthalidone for leg edema. He has CKD and poor intake at baseline. Improved renal function.   Plan: Recheck kidneys in AM.     Active Problems:    Multiple myeloma (H)  Assessment: chronic      Stem cells transplant status (H)      Acute renal failure superimposed on stage 3 chronic kidney disease, unspecified acute renal failure type, unspecified whether stage 3a or 3b CKD (H)  Assessment: secondary to hypovolemia  Plan: intravenous fluids          Diet: Combination Diet Regular Diet Adult    DVT Prophylaxis: Pneumatic Compression Devices  Guevara Catheter: Not present  Central Lines: None  Cardiac Monitoring: None  Code Status: Full Code         The patient's care was discussed with the Patient.    Yoav Hoyt MD  Hospitalist Service  [facility]  Securely message with the Vocera Web Console (learn more here)  Text page via [employer] Paging/Directory         Interval History   Feels better. Left IV infiltrated with left hand edema.     Data reviewed today: I reviewed all medications, new labs and imaging results over the last 24 hours. I personally reviewed no images or EKG's today.    Physical Exam   Vital Signs: Temp: 97.7  F (36.5  C) Temp src: Temporal BP: 132/60 Pulse: 78   Resp: 13 SpO2: 95 % O2 Device: Nasal cannula Oxygen Delivery: 2 LPM  Weight: 207 lbs 10.77 oz  GENERAL: Comfortable, no apparent distress.  CARDIOVASCULAR:  regular rate and rhythm, no murmur. No lower extremity edema   RESPIRATORY: Clear to auscultation bilaterally, no wheezes or crackles.  GI: non-tender, non-distended, normal bowel sounds.   SKIN: warm periphery, no rashes      Data   Recent Labs   Lab 08/18/22  0555 08/17/22  1122   WBC 13.5* 11.4*   HGB 8.5* 9.4*   * 101*   * 149*   INR  --  1.21*    135   POTASSIUM 4.3 3.9   CHLORIDE 108* 104   CO2 21 22   BUN 31* 32*   CR 2.15* 3.31*   ANIONGAP 7 9   OLAYINKA 8.2* 8.8   GLC 92 126*   ALBUMIN 2.8* 3.1*   PROTTOTAL 6.1* 6.3*   BILITOTAL 0.8 1.0   ALKPHOS 45 52   ALT 17 19   AST 24 41*     Recent Results (from the past 24 hour(s))   XR Chest Port 1 View    Narrative    PROCEDURE: XR CHEST PORT 1 VIEW 8/17/2022 10:48 AM    HISTORY: unresponsive    COMPARISONS: 10/29/2019.    TECHNIQUE: Single portable view.    FINDINGS: Level of inspiration is quite low. Heart appears enlarged  but is probably similar to the prior exam. There is some abnormal  increased density at the right lung base, probably due to atelectasis  given low level of inspiration. It does appear different than on the  prior exam. No definite infiltrate is seen on the left. No definite  effusion is seen.         Impression    IMPRESSION: Probable atelectasis at the right lung base. Focal  pneumonia is difficult to exclude.    Changes at the left lung base appear more chronic.    MANDA WAGNER MD         SYSTEM ID:  [id number]   CT Head w/o Contrast    Narrative    EXAM: CT HEAD W/O CONTRAST 8/17/2022 11:43 AM    PROVIDED HISTORY: change in mental status- known stage 4 melanoma,  concern for metastatic disease to brain    COMPARISON: Bone scan 7/1/2010    TECHNIQUE:   Imaging protocol: Multiplanar CT images of the head without  intravenous contrast.   Acquisition: This CT exam was performed using one or more the  following dose reduction techniques: automated exposure control,  adjustment of the mA and/or kV according to patient size,  and/or  iterative reconstruction technique.    FINDINGS:  No intracranial hemorrhage, mass effect, or midline shift. The  ventricles are proportionate to the cerebral sulci. Scattered patchy  foci of hypodensity in the periventricular and supraventricular white  matter, which is nonspecific, likely related to chronic small vessel  ischemic disease given the patient's age. Mild general parenchymal  volume loss. No acute loss of gray-white matter differentiation.    No acute osseous abnormality. Multifocal lytic lesions throughout the  calvarium, including 2.3 x 2.2 cm lesion in the left parietal lobe  (series 3 image 29), new since comparison bone scan 7/1/2010. Mild  paranasal sinus mucosal thickening. Mastoid air cells are clear. The  orbits are grossly unremarkable.       Impression    IMPRESSION:  1.  No acute intracranial abnormality or intracranial mass lesion.  2.  Multifocal lytic calvarial lesions, concerning for metastatic  disease.    ERNESTO STAFFORD MD         SYSTEM ID:  [id number]     Medications     norepinephrine Stopped (08/17/22 1915)     sodium chloride 50 mL/hr at 08/18/22 0829       acyclovir  400 mg Oral BID     aspirin  81 mg Oral Daily     folic acid  1 mg Oral Daily     oxyCODONE  10 mg Oral TID     sodium chloride (PF)  3 mL Intracatheter Q8H     tamsulosin  0.4 mg Oral QPM

## 2022-08-18 NOTE — PROVIDER NOTIFICATION
08/18/22 1345   Valuables   Patient Belongings remains with patient  (wife took watch home)   Patient Belongings Remaining with Patient clothing;glasses;shoes  (pants, shirt, underwear, socks)   Did you bring any home meds/supplements to the hospital?  No   [facility] will make every effort per our policy to help keep your items safe while in the hospital.  If you choose to keep any items at the bedside, we cannot be held responsible for any items that are lost or broken.      List items sent to safe: none    I have reviewed my belongings list on admission and verify that it is correct.     Patient signature_______________________________  Date/Time_____________________    2nd Staff person if patient unable to sign __________________________  Date/Time ______________________      I have received all my belongings noted above at discharge.    Patient signature________________________________  Date/Time  __________________________

## 2022-08-18 NOTE — PLAN OF CARE
"/50   Pulse 75   Temp 98  F (36.7  C) (Temporal)   Resp 14   Ht 1.702 m (5' 7\")   Wt 94.5 kg (208 lb 5.4 oz)   SpO2 96%   BMI 32.63 kg/m      BP stable without support.  O2 stable on chronic 2L.  Adequate output, clear lung sounds.  Pt declines HS Flomax, concerned about bp.    Lesly Alvarado RN.............................8/18/2022 4:49 AM    "

## 2022-08-18 NOTE — PHARMACY
Pharmacy - Transfer Medication Reconciliation     The patient's transfer medication orders have been compared to the medication administration record and to the Prior to Admissions Medications list - any noted discrepancies were resolved with the MD.     Thank you. Pharmacy will continue to monitor.     Tori Orona [facility] ....................  8/18/2022   12:16 PM

## 2022-08-18 NOTE — PROGRESS NOTES
Hold levophed until MAP < 60 per MD.  If symptomatic will restart drip.  Lesly Alvarado RN.............................8/17/2022 9:52 PM

## 2022-08-18 NOTE — PROGRESS NOTES
08/18/22 1527   Quick Adds   Type of Visit Initial Occupational Therapy Evaluation   Living Environment   People in Home spouse   Current Living Arrangements apartment   Home Accessibility no concerns   Transportation Anticipated car, drives self   Self-Care   Usual Activity Tolerance good   Current Activity Tolerance moderate   Equipment Currently Used at Home shower chair  (walking stick)   Cognitive Status Examination   Orientation Status orientation to person, place and time   Affect/Mental Status (Cognitive) WFL   Follows Commands WFL   Visual Perception   Visual Impairment/Limitations WFL   Pain Assessment   Patient Currently in Pain   (reported some pain in chaest with walking, MD notified and addressing, pain subsided with rest)   Range of Motion Comprehensive   General Range of Motion no range of motion deficits identified   Coordination   Upper Extremity Coordination No deficits were identified   Transfers   Transfers sit-stand transfer   Sit-Stand Transfer   Sit-Stand [place] (Transfers) supervision   Clinical Impression   Criteria for Skilled Therapeutic Interventions Met (OT) Yes, treatment indicated   OT Diagnosis Hypovolemia   Influenced by the following impairments weakness   OT Problem List-Impairments impacting ADL activity tolerance impaired   Assessment of Occupational Performance 1-3 Performance Deficits   Identified Performance Deficits mobility/endurance for self cares   Planned Therapy Interventions (OT) ADL retraining;progressive activity/exercise   Clinical Decision Making Complexity (OT) low complexity   Risk & Benefits of therapy have been explained risks/benefits reviewed   OT Discharge Planning   OT Discharge Recommendation (DC Rec) home with assist   OT Rationale for DC Rec Pt will likely have no OT needs at time of D/C but will continue to assess   OT Brief overview of current status Pt was somewhat agaitated with Therapists at beginning of session but improved throughout, pt  ambulated in hallway with use of his walking stick and CGA, was fatigued and reported some chest discomfort after activity which was reported to MD and RN   Total Evaluation Time (Minutes)   Total Evaluation Time (Minutes) 30   OT Goals   Therapy Frequency (OT) Daily   OT Predicted Duration/Target Date for Goal Attainment 08/20/22   OT Goals Lower Body Dressing;Lower Body Bathing;Transfers;Toilet Transfer/Toileting   OT: Lower Body Dressing Supervision/stand-by assist   OT: Lower Body Bathing Supervision/stand-by assist   OT: Transfer Supervision/stand-by assist   OT: Toilet Transfer/Toileting Supervision/stand-by assist

## 2022-08-18 NOTE — PHARMACY-ADMISSION MEDICATION HISTORY
Pharmacy -- Admission Medication Reconciliation    Prior to admission (PTA) medications were reviewed and the patient's PTA medication list was updated.    Sources Consulted: medication bottles from home, patient-supplied medication list    The reliability of this Medication Reconciliation is: Reliability: Reliable    The following significant changes were made:  -Updated patient's preferred pharmacy ([facility])    -Added Chlorthalidone  -Added Calcium    -Changed Loperamide directions (pt taking SCHEDULED, not PRN)  -Changed Aspirin tab formulation (pt takes chewable Aspirin)  -Changed Omeprazole formulation (pt takes caps)    -Removed Multivitamin      **Refer to prior med rec notes for additional changes**    **Notes: Chlorthalidone prescribed on 8/7 by Dr. Langley in [place]- this has been added to patient's med list as patient has started taking at home.      In addition, the patient's allergies were reviewed with the patient and left as follows:   Allergies: Dristan, Hydrocodone-homatropine, Hydromet [hydrocodone w/homatropine], Prochlorperazine, and Prochlorperazine maleate    The pharmacist has reviewed with the patient that all personal medications should be removed from the building or locked in the belongings safe.  Patient shall only take medications ordered by the physician and administered by the nursing staff.       Medication barriers identified: wife and patient not very knowledgeable about medications off-hand, as outlined in prior med rec notes   Medication adherence concerns: none noted   Understanding of emergency medications: N/A    Duy Reeves RPH, 8/18/2022,  1:56 PM

## 2022-08-18 NOTE — PROGRESS NOTES
08/18/22 1400   Quick Adds   Type of Visit Initial PT Evaluation   Living Environment   People in Home spouse   Current Living Arrangements house   Home Accessibility no concerns   Transportation Anticipated family or friend will provide;car, drives self   Self-Care   Usual Activity Tolerance moderate   Current Activity Tolerance fair   Equipment Currently Used at Home   (walking stick)   Fall history within last six months no   General Information   Referring Physician Lai   Patient/Family Therapy Goals Statement (PT) return home   Existing Precautions/Restrictions fall   Weight-Bearing Status - LLE full weight-bearing   Weight-Bearing Status - RLE full weight-bearing   Cognition   Affect/Mental Status (Cognition) WFL   Orientation Status (Cognition) oriented x 4   Follows Commands (Cognition) WFL   Pain Assessment   Patient Currently in Pain Yes, see Vital Sign flowsheet  (mild chest discomfort reported by patient following ambulation)   Integumentary/Edema   Integumentary/Edema Comments edema noted in lower legs   Posture    Posture Not impaired   Range of Motion (ROM)   Range of Motion ROM is WFL   Strength (Manual Muscle Testing)   Strength (Manual Muscle Testing) strength is WFL   Bed Mobility   Comment, (Bed Mobility) SBA   Transfers   Comment, (Transfers) SBA with walking stick   Gait/Stairs (Locomotion)   [place] Level (Gait) contact guard  (to SBA)   Assistive Device (Gait) other (see comments)  (walking stick)   Distance in Feet (Required for LE Total Joints) 200   Pattern (Gait) 2-point   Balance   Balance Comments good with walking stick   Sensory Examination   Sensory Perception WFL   Coordination   Coordination no deficits were identified   Muscle Tone   Muscle Tone no deficits were identified   Clinical Impression   Criteria for Skilled Therapeutic Intervention Yes, treatment indicated   PT Diagnosis (PT) impaired mobility   Influenced by the following impairments fatigue and SOB    Functional limitations due to impairments activity/gait tolerance   Clinical Presentation (PT Evaluation Complexity) Stable/Uncomplicated   Clinical Decision Making (Complexity) low complexity   Planned Therapy Interventions (PT) gait training   Anticipated Equipment Needs at Discharge (PT) other (see comments)  (walking)   Risk & Benefits of therapy have been explained risks/benefits reviewed;patient   PT Discharge Planning   PT Discharge Recommendation (DC Rec) home with assist;home with home care physical therapy   PT Rationale for DC Rec to promote return to his prior level of functional activity/gait tolerance   PT Brief overview of current status CGA to SBA for all mobilities using a walking stick for ambulation   Total Evaluation Time   Total Evaluation Time (Minutes) 15   Physical Therapy Goals   PT Frequency Daily   PT Predicted Duration/Target Date for Goal Attainment 08/22/22   PT Goals Gait   PT: Gait Supervision/stand-by assist;Greater than 200 feet

## 2022-08-18 NOTE — PROGRESS NOTES
:     It was mentioned that patient may need home care services at the time of discharge. PT/OT to evaluate. Patient may need medication management services.      will continue to follow for discharge planning needs.     WARD Moyer on 8/18/2022 at 12:14 PM

## 2022-08-18 NOTE — PROGRESS NOTES
Patient transferred to medical floor with belongings (watch and shirt) via wheelchair. Report given to BAILEY Mcdonough.

## 2022-08-18 NOTE — PLAN OF CARE
Goal Outcome Evaluation:        Problem: Plan of Care - These are the overarching goals to be used throughout the patient stay.    Goal: Absence of Hospital-Acquired Illness or Injury  Intervention: Identify and Manage Fall Risk  Recent Flowsheet Documentation  Taken 8/18/2022 1517 by Sharonda Hess RN  Safety Promotion/Fall Prevention:   activity supervised   safety round/check completed  SBA, patient instructed to call not fall  Intervention: Prevent Skin Injury  Recent Flowsheet Documentation  Taken 8/18/2022 1517 by Sharonda Hess RN  Body Position: position changed independently  Taken 8/18/2022 1115 by Sharonda Hess RN  Body Position: position changed independently  Patient is able to reposition himself  Intervention: Prevent and Manage VTE (Venous Thromboembolism) Risk  Recent Flowsheet Documentation  Taken 8/18/2022 1517 by Sharonda Hess RN  VTE Prevention/Management: SCDs (sequential compression devices) on  Activity Management:   activity adjusted per tolerance   ambulated to bathroom  Taken 8/18/2022 1115 by Sharonda Hess RN  VTE Prevention/Management: SCDs (sequential compression devices) on  Activity Management: activity adjusted per tolerance  Patient is wearing SCD's while in the bed

## 2022-08-19 ENCOUNTER — APPOINTMENT (OUTPATIENT)
Dept: PHYSICAL THERAPY | Facility: OTHER | Age: 82
DRG: 682 | End: 2022-08-19
Payer: MEDICARE

## 2022-08-19 ENCOUNTER — APPOINTMENT (OUTPATIENT)
Dept: OCCUPATIONAL THERAPY | Facility: OTHER | Age: 82
DRG: 682 | End: 2022-08-19
Payer: MEDICARE

## 2022-08-19 LAB
ANION GAP SERPL CALCULATED.3IONS-SCNC: 8 MMOL/L (ref 3–14)
BUN SERPL-MCNC: 24 MG/DL (ref 7–25)
CALCIUM SERPL-MCNC: 9 MG/DL (ref 8.6–10.3)
CHLORIDE BLD-SCNC: 106 MMOL/L (ref 98–107)
CO2 SERPL-SCNC: 23 MMOL/L (ref 21–31)
CREAT SERPL-MCNC: 1.56 MG/DL (ref 0.7–1.3)
ERYTHROCYTE [DISTWIDTH] IN BLOOD BY AUTOMATED COUNT: 14.6 % (ref 10–15)
GFR SERPL CREATININE-BSD FRML MDRD: 44 ML/MIN/1.73M2
GLUCOSE BLD-MCNC: 95 MG/DL (ref 70–105)
HCT VFR BLD AUTO: 25.8 % (ref 40–53)
HGB BLD-MCNC: 8.6 G/DL (ref 13.3–17.7)
MCH RBC QN AUTO: 33.7 PG (ref 26.5–33)
MCHC RBC AUTO-ENTMCNC: 33.3 G/DL (ref 31.5–36.5)
MCV RBC AUTO: 101 FL (ref 78–100)
PLATELET # BLD AUTO: 111 10E3/UL (ref 150–450)
POTASSIUM BLD-SCNC: 4.4 MMOL/L (ref 3.5–5.1)
RBC # BLD AUTO: 2.55 10E6/UL (ref 4.4–5.9)
SODIUM SERPL-SCNC: 137 MMOL/L (ref 134–144)
WBC # BLD AUTO: 12.4 10E3/UL (ref 4–11)

## 2022-08-19 PROCEDURE — 80048 BASIC METABOLIC PNL TOTAL CA: CPT | Performed by: INTERNAL MEDICINE

## 2022-08-19 PROCEDURE — 120N000001 HC R&B MED SURG/OB

## 2022-08-19 PROCEDURE — 250N000013 HC RX MED GY IP 250 OP 250 PS 637: Performed by: FAMILY MEDICINE

## 2022-08-19 PROCEDURE — 250N000011 HC RX IP 250 OP 636: Performed by: INTERNAL MEDICINE

## 2022-08-19 PROCEDURE — 85027 COMPLETE CBC AUTOMATED: CPT | Performed by: INTERNAL MEDICINE

## 2022-08-19 PROCEDURE — 250N000013 HC RX MED GY IP 250 OP 250 PS 637: Performed by: INTERNAL MEDICINE

## 2022-08-19 PROCEDURE — 97530 THERAPEUTIC ACTIVITIES: CPT | Mod: GP

## 2022-08-19 PROCEDURE — 99232 SBSQ HOSP IP/OBS MODERATE 35: CPT | Performed by: FAMILY MEDICINE

## 2022-08-19 PROCEDURE — 36415 COLL VENOUS BLD VENIPUNCTURE: CPT | Performed by: INTERNAL MEDICINE

## 2022-08-19 PROCEDURE — 97535 SELF CARE MNGMENT TRAINING: CPT | Mod: GO

## 2022-08-19 RX ORDER — DILTIAZEM HYDROCHLORIDE 120 MG/1
120 CAPSULE, COATED, EXTENDED RELEASE ORAL DAILY
Status: DISCONTINUED | OUTPATIENT
Start: 2022-08-19 | End: 2022-08-20 | Stop reason: HOSPADM

## 2022-08-19 RX ORDER — GABAPENTIN 300 MG/1
900 CAPSULE ORAL 3 TIMES DAILY
Status: DISCONTINUED | OUTPATIENT
Start: 2022-08-19 | End: 2022-08-20 | Stop reason: HOSPADM

## 2022-08-19 RX ORDER — OXYCODONE HCL 10 MG/1
10 TABLET, FILM COATED, EXTENDED RELEASE ORAL EVERY MORNING
Status: COMPLETED | OUTPATIENT
Start: 2022-08-19 | End: 2022-08-19

## 2022-08-19 RX ORDER — OXYCODONE HCL 10 MG/1
10 TABLET, FILM COATED, EXTENDED RELEASE ORAL 2 TIMES DAILY
Status: DISCONTINUED | OUTPATIENT
Start: 2022-08-19 | End: 2022-08-20 | Stop reason: HOSPADM

## 2022-08-19 RX ORDER — OXYCODONE HCL 20 MG/1
20 TABLET, FILM COATED, EXTENDED RELEASE ORAL EVERY MORNING
Status: DISCONTINUED | OUTPATIENT
Start: 2022-08-20 | End: 2022-08-20 | Stop reason: HOSPADM

## 2022-08-19 RX ADMIN — OXYCODONE HYDROCHLORIDE 5 MG: 5 TABLET ORAL at 21:27

## 2022-08-19 RX ADMIN — ACYCLOVIR 400 MG: 200 CAPSULE ORAL at 21:27

## 2022-08-19 RX ADMIN — OXYCODONE HYDROCHLORIDE 10 MG: 10 TABLET, FILM COATED, EXTENDED RELEASE ORAL at 10:17

## 2022-08-19 RX ADMIN — OXYCODONE HYDROCHLORIDE 5 MG: 5 TABLET ORAL at 02:48

## 2022-08-19 RX ADMIN — DILTIAZEM HYDROCHLORIDE 120 MG: 120 CAPSULE, COATED, EXTENDED RELEASE ORAL at 10:17

## 2022-08-19 RX ADMIN — OXYCODONE HYDROCHLORIDE 10 MG: 10 TABLET, FILM COATED, EXTENDED RELEASE ORAL at 06:43

## 2022-08-19 RX ADMIN — OXYCODONE HYDROCHLORIDE 10 MG: 10 TABLET, FILM COATED, EXTENDED RELEASE ORAL at 18:18

## 2022-08-19 RX ADMIN — GABAPENTIN 900 MG: 300 CAPSULE ORAL at 21:27

## 2022-08-19 RX ADMIN — ACYCLOVIR 400 MG: 200 CAPSULE ORAL at 09:17

## 2022-08-19 RX ADMIN — GABAPENTIN 900 MG: 300 CAPSULE ORAL at 14:19

## 2022-08-19 RX ADMIN — FOLIC ACID 1 MG: 1 TABLET ORAL at 09:17

## 2022-08-19 RX ADMIN — OXYCODONE HYDROCHLORIDE 10 MG: 10 TABLET, FILM COATED, EXTENDED RELEASE ORAL at 12:11

## 2022-08-19 RX ADMIN — TAMSULOSIN HYDROCHLORIDE 0.4 MG: 0.4 CAPSULE ORAL at 21:27

## 2022-08-19 RX ADMIN — ONDANSETRON 4 MG: 4 TABLET, ORALLY DISINTEGRATING ORAL at 17:07

## 2022-08-19 RX ADMIN — ASPIRIN 81 MG: 81 TABLET, COATED ORAL at 09:17

## 2022-08-19 ASSESSMENT — ACTIVITIES OF DAILY LIVING (ADL)
ADLS_ACUITY_SCORE: 23

## 2022-08-19 NOTE — PROGRESS NOTES
08/19/22 1400   Signing Clinician's Name / Credentials   Signing clinician's name / credentials Denia Wasserman OTR/L   Quick Adds   Rehab Discipline OT   ADL Training   Minutes of Treatment 30   Symptoms Noted During/After Treatment fatigue   Treatment Energy conservation/work simplification   Treatment Detail Sit to stand from bed with SBA. Patient ambulated to the bathroom several times, required several sitting rest breaks on the bed. Stood at the sink for 6 minutes with SBA to shave. Then again for 3 minutes to wash and brush. Completed all aspects of toileting with supervision. Patient reports being achy all over and tires out quickly with activity.   Patient Response Incorporates energy conservation into ADL   Grooming Training   [place] Level (Grooming Training) stand-by assist   Toilet Training   [place] Level (Toilet Training) stand-by assist   OT Discharge Planning   OT Discharge Recommendation (DC Rec) home with home care occupational therapy   OT Rationale for DC Rec Patient fatigues quickly and requires assist with ADLs.   Total Session Time   Total Session Time (minutes) 30 minutes

## 2022-08-19 NOTE — PROGRESS NOTES
:     Met with patient in room to discuss discharge planning needs. It was mentioned that patient would benefit from home care services. When offered to patient, he stated that he does not want to receive home care services. He stated that he does not feel that he needs them at this time.     No referrals were made for home care due to patient declining this service.    Pt/family was given the Medicare Compare list for Home Care, with associated star ratings to assist with choice for referrals/discharge planning Yes    Education was given to pt/family that star ratings are updated/maintained by Medicare and can be reviewed by visiting www.medicare.gov Yes    WARD Moyer on 8/19/2022 at 2:04 PM

## 2022-08-19 NOTE — PLAN OF CARE
"Patient is irritable. Vitals are stable but his blood pressure is elevated. Patient complained of pain and the MD updated his medications. Patient made multiple comments stating that his wife should have just left him and let him die. Nurse comforted the patient. No new concern a this time. Plan is for possible discharge tomorrow.    1700- Patient complained of Nausea and Zofran was given with relief. Patient was able to sip on chicken broth without any issues.  No new concerns at this time.    Problem: Plan of Care - These are the overarching goals to be used throughout the patient stay.    Goal: Plan of Care Review/Shift Note  Description: The Plan of Care Review/Shift note should be completed every shift.  The Outcome Evaluation is a brief statement about your assessment that the patient is improving, declining, or no change.  This information will be displayed automatically on your shift note.  Outcome: Ongoing, Progressing  Flowsheets (Taken 8/19/2022 1158)  Plan of Care Reviewed With: patient  Overall Patient Progress: improving  Goal: Patient-Specific Goal (Individualized)  Description: You can add care plan individualizations to a care plan. Examples of Individualization might be:  \"Parent requests to be called daily at 9am for status\", \"I have a hard time hearing out of my right ear\", or \"Do not touch me to wake me up as it startles me\".  Outcome: Ongoing, Progressing  Goal: Absence of Hospital-Acquired Illness or Injury  Outcome: Ongoing, Progressing  Intervention: Identify and Manage Fall Risk  Recent Flowsheet Documentation  Taken 8/19/2022 0900 by Stanford Brito RN  Safety Promotion/Fall Prevention: safety round/check completed  Intervention: Prevent and Manage VTE (Venous Thromboembolism) Risk  Recent Flowsheet Documentation  Taken 8/19/2022 0900 by Stanford Brito RN  VTE Prevention/Management: patient refused intervention  Goal: Optimal Comfort and Wellbeing  Outcome: Ongoing, " Progressing  Goal: Readiness for Transition of Care  Outcome: Ongoing, Progressing       Goal Outcome Evaluation:    Plan of Care Reviewed With: patient     Overall Patient Progress: improving

## 2022-08-19 NOTE — PLAN OF CARE
"Patient Sp02 >92% on 2L via NC, this is patient baseline.  Lungs are diminished with crackles in bases.  Denies shortness of breath at rest.  Pain treated with oxycodone.  Patient had a  hard time sleeping last night and was getting frustrated.  BPs elevated, patient states after given oxycodone he was able to sleep for very short time but \"it helped\".  Given morning dose of scheduled oxycodone this morning.  Other VSS, afebrile.     Goal Outcome Evaluation:    Plan of Care Reviewed With: patient     Overall Patient Progress: improving           "

## 2022-08-19 NOTE — PROGRESS NOTES
[facility]    Medicine Progress Note - Hospitalist Service    Date of Admission:  8/17/2022    Assessment & Plan             Aden Rosa is a 82 year old male admitted on 8/17/2022. He presents with a syncopal spell.      Principal Problem:     Hypovolemic shock  Assessment: Initially patient in hypovolemic shock requiring norepinephrine to maintain blood pressures.  Patient also found to have acute kidney injury.  Likely due to poor oral intake and diuretic.  Patient was fluid resuscitated and improved.  Now renal function normalized.  Blood pressure is now high.  Plan:  Restart diltiazem.  Continue to hold diuretic and ACE inhibitor.       Hypovolemia dehydration  Assessment: As noted above patient has had poor oral intake prior to admission but continues to have minimal intake here.  Plan:  Encourage oral hydration.  Recheck renal function in a.m.     Active Problems:    Multiple myeloma (H)  Assessment: chronic       Stem cells transplant status (H)       Acute renal failure superimposed on stage 3 chronic kidney disease, unspecified acute renal failure type, unspecified whether stage 3a or 3b CKD (H)  Assessment: Improved.  Plan: Monitor      Patient is chronically on high-dose gabapentin and narcotics for chronic back pain.  The gabapentin was discontinued and narcotics have been lowered compared to his home dose.  Today patient reports signs and symptoms consistent with mild withdrawal.  Will try to adjust medications to match his home dose with hope to be able to discharge tomorrow.          Diet: Combination Diet Regular Diet Adult    DVT Prophylaxis: Pneumatic Compression Devices  Guevara Catheter: Not present  Central Lines: None  Cardiac Monitoring: None  Code Status: Full Code      Disposition Plan         The patient's care was discussed with the Patient.    Satish Alvarado MD  Hospitalist Service  [facility]  Securely message with the Vocera Web Console  "(learn more here)  Text page via [facility] Paging/Directory         Clinically Significant Risk Factors Present on Admission               # Obesity: Estimated body mass index is 32.19 kg/m  as calculated from the following:    Height as of this encounter: 1.702 m (5' 7\").    Weight as of this encounter: 93.2 kg (205 lb 8 oz).        ______________________________________________________________________    Interval History   Patient reports a very difficult night.  Tells me he is feeling much better in the ICU but since being transferred to medical floor \"everything has went wrong\".  He is frustrated that he was taken to our hospital rather than [facility].  He reports the bed is uncomfortable.  Wanted some new pajamas last night and the close he was given for brand-new which bothers him that they had not been prewashed.  After quite a bit of discussion over many issues that are nonmedical in nature but frustrating for him he did report that at home he typically takes 40 mg of OxyContin daily and it sounds like about the same amount of oxycodone.  He has been getting established here.  Today feels agitated, hot and cold, increased pain.  Also his gabapentin was held.    Data reviewed today: I reviewed all medications, new labs and imaging results over the last 24 hours. I personally reviewed no images or EKG's today.    Physical Exam   Vital Signs: Temp: 98.1  F (36.7  C) Temp src: Tympanic BP: (!) 176/69 Pulse: 84   Resp: 18 SpO2: 95 % O2 Device: Nasal cannula Oxygen Delivery: 2 LPM  Weight: 205 lbs 8 oz  Constitutional: awake, alert, cooperative, no apparent distress, and appears stated age  Respiratory: Clear to ascultation bilaterally.  No increased respiratory effort.   Cardiovascular: Regular rate and rhythm.  No murmurs rubs or gallops heard.   Musculoskeletal: No synovitis.  Muscle strength age and body habitus appropriate as well as equal and even.   Neuropsychiatric: General: normal, calm and normal " eye contact  Memory and insight: normal, memory for past and recent events intact and thought process normal    Data   Recent Labs   Lab 08/19/22  0541 08/18/22  0555 08/17/22  1122   WBC 12.4* 13.5* 11.4*   HGB 8.6* 8.5* 9.4*   * 104* 101*   * 105* 149*   INR  --   --  1.21*    136 135   POTASSIUM 4.4 4.3 3.9   CHLORIDE 106 108* 104   CO2 23 21 22   BUN 24 31* 32*   CR 1.56* 2.15* 3.31*   ANIONGAP 8 7 9   OLAYINKA 9.0 8.2* 8.8   GLC 95 92 126*   ALBUMIN  --  2.8* 3.1*   PROTTOTAL  --  6.1* 6.3*   BILITOTAL  --  0.8 1.0   ALKPHOS  --  45 52   ALT  --  17 19   AST  --  24 41*     No results found for this or any previous visit (from the past 24 hour(s)).

## 2022-08-19 NOTE — PROGRESS NOTES
08/19/22 1418   Signing Clinician's Name / Credentials   Signing clinician's name / credentials Yimi Plasencia DPT   Quick Adds   Rehab Discipline PT   Quick Adds Interventions Mobility;Therapeutic Activity   Gait Training   Symptoms Noted During/After Treatment (Gait Training) fatigue;shortness of breath   Distance in Feet (Required for LE Total Joints) 75 feet total   Treatment Detail ambulation to and from bathroom x 3 today for rest breaks and self cares with OT.   [place] Level (Gait Training) contact guard   Physical Assistance Level (Gait Training) 1 person assist   Weight Bearing (Gait Training) full weight-bearing   Gait Analysis Deviations decreased jones   Therapeutic Activity   Treatment Detail sit to stands without assist. No loss of balance or lightheadedness   PT Discharge Planning   PT Discharge Recommendation (DC Rec) home with assist;home with outpatient physical therapy   PT Rationale for DC Rec to promote return to his prior level of functional activity/gait tolerance   PT Brief overview of current status CGA to SBA for all mobilities using a walking stick for long distance ambulation. Can walk short distances without walking stick. On 2L in room.   Additional Documentation   Rehab Comments Pt in bathroom when PT/OT approach room. Assisted pt with self cares and when finished pt was fatigued and declined walk into hallway. Pt did not have his O2 on while in bathroom but no obvious signs of SOB. O2 placed back on when returning to bed.   PT Plan continue PT   Total Session Time   Total Session Time (minutes) 30 minutes

## 2022-08-20 ENCOUNTER — APPOINTMENT (OUTPATIENT)
Dept: OCCUPATIONAL THERAPY | Facility: OTHER | Age: 82
DRG: 682 | End: 2022-08-20
Payer: MEDICARE

## 2022-08-20 ENCOUNTER — APPOINTMENT (OUTPATIENT)
Dept: PHYSICAL THERAPY | Facility: OTHER | Age: 82
DRG: 682 | End: 2022-08-20
Payer: MEDICARE

## 2022-08-20 VITALS
WEIGHT: 205.5 LBS | HEIGHT: 67 IN | TEMPERATURE: 98 F | OXYGEN SATURATION: 97 % | HEART RATE: 72 BPM | DIASTOLIC BLOOD PRESSURE: 75 MMHG | SYSTOLIC BLOOD PRESSURE: 155 MMHG | RESPIRATION RATE: 16 BRPM | BODY MASS INDEX: 32.25 KG/M2

## 2022-08-20 PROCEDURE — 97116 GAIT TRAINING THERAPY: CPT | Mod: GP

## 2022-08-20 PROCEDURE — 97530 THERAPEUTIC ACTIVITIES: CPT | Mod: GO | Performed by: OCCUPATIONAL THERAPIST

## 2022-08-20 PROCEDURE — 250N000013 HC RX MED GY IP 250 OP 250 PS 637: Performed by: FAMILY MEDICINE

## 2022-08-20 PROCEDURE — 250N000013 HC RX MED GY IP 250 OP 250 PS 637: Performed by: INTERNAL MEDICINE

## 2022-08-20 PROCEDURE — 99239 HOSP IP/OBS DSCHRG MGMT >30: CPT | Performed by: FAMILY MEDICINE

## 2022-08-20 RX ADMIN — OXYCODONE HYDROCHLORIDE 20 MG: 20 TABLET, FILM COATED, EXTENDED RELEASE ORAL at 06:45

## 2022-08-20 RX ADMIN — DILTIAZEM HYDROCHLORIDE 120 MG: 120 CAPSULE, COATED, EXTENDED RELEASE ORAL at 09:57

## 2022-08-20 RX ADMIN — OXYCODONE HYDROCHLORIDE 5 MG: 5 TABLET ORAL at 02:18

## 2022-08-20 RX ADMIN — ACYCLOVIR 400 MG: 200 CAPSULE ORAL at 09:57

## 2022-08-20 RX ADMIN — ASPIRIN 81 MG: 81 TABLET, COATED ORAL at 09:57

## 2022-08-20 RX ADMIN — FOLIC ACID 1 MG: 1 TABLET ORAL at 09:57

## 2022-08-20 RX ADMIN — OXYCODONE HYDROCHLORIDE 10 MG: 10 TABLET, FILM COATED, EXTENDED RELEASE ORAL at 12:08

## 2022-08-20 RX ADMIN — GABAPENTIN 900 MG: 300 CAPSULE ORAL at 06:45

## 2022-08-20 ASSESSMENT — ACTIVITIES OF DAILY LIVING (ADL)
ADLS_ACUITY_SCORE: 22
ADLS_ACUITY_SCORE: 22
ADLS_ACUITY_SCORE: 23

## 2022-08-20 NOTE — PHARMACY - DISCHARGE MEDICATION RECONCILIATION AND EDUCATION
[facility]  Part of [address] [address]    August 20, 2022    Dear Pharmacist,    Your customer, Aden Rosa, born on 1940, was recently discharged from [facility].  We have updated his medication list and want to alert you to the following:       Review of your medicines      CONTINUE these medicines which have NOT CHANGED      Dose / Directions   acyclovir 400 MG tablet  Commonly known as: ZOVIRAX      Dose: 400 mg  Take 400 mg by mouth 2 times daily At 7am and 7pm  Refills: 0     aspirin 81 MG chewable tablet  Commonly known as: ASA      Dose: 81 mg  Take 81 mg by mouth daily  Refills: 0     calcium carbonate 500 mg (elemental) 1250 (500 Ca) MG tablet chewable      Dose: 500 mg  Take 500 mg by mouth daily  Refills: 0     cyanocobalamin 100 MCG tablet  Commonly known as: VITAMIN B-12      Dose: 100 mcg  Take 100 mcg by mouth daily  Refills: 0     diltiazem  MG 24 hr capsule  Commonly known as: DILT-XR      Dose: 120 mg  Take 120 mg by mouth daily  Refills: 0     diphenhydrAMINE 50 MG capsule  Commonly known as: BENADRYL      Dose: 50 mg  Take 50 mg by mouth every 6 hours as needed for itching or allergies  Refills: 0     diphenoxylate-atropine 2.5-0.025 MG tablet  Commonly known as: LOMOTIL      Dose: 1 tablet  Take 1 tablet by mouth 4 times daily as needed  Refills: 0     FERROUS SULFATE PO      Dose: 325 mg  Take 325 mg by mouth daily  Refills: 0     folic acid 1 MG tablet  Commonly known as: FOLVITE      Dose: 1 mg  Take 1 mg by mouth daily  Refills: 0     gabapentin 300 MG capsule  Commonly known as: NEURONTIN      Dose: 900 mg  Take 900 mg by mouth 3 times daily  Refills: 0     loperamide 2 MG tablet  Commonly known as: IMODIUM A-D      Dose: 2 mg  Take 2 mg by mouth 3 times daily  Refills: 0     omeprazole 20 MG DR capsule  Commonly known as: priLOSEC      Dose: 20 mg  Take 20 mg by mouth daily  Refills: 0      * oxyCODONE 5 MG tablet  Commonly known as: ROXICODONE      TAKE 1 TABLET BY MOUTH EVERY 6 HOURS AS NEEDED FOR PAIN  Refills: 0     * oxyCODONE 10 MG 12 hr tablet  Commonly known as: oxyCONTIN      Per [facility], takes 2 tabs at 7 AM, 1 tab at Noon, and 1 tab at 7 PM.  Refills: 0     tamsulosin 0.4 MG capsule  Commonly known as: FLOMAX      Dose: 0.4 mg  Take 0.4 mg by mouth every evening at 7pm  Refills: 0         * This list has 2 medication(s) that are the same as other medications prescribed for you. Read the directions carefully, and ask your doctor or other care provider to review them with you.            STOP taking    chlorthalidone 25 MG tablet  Commonly known as: HYGROTON        lisinopril 20 MG tablet  Commonly known as: ZESTRIL               We also reviewed Aden Rosa's allergy list and updated it as needed:  Allergies: Dristan, Hydrocodone-homatropine, Hydromet [hydrocodone w/homatropine], Prochlorperazine, and Prochlorperazine maleate    Thank you for continuing to care for Aden Rosa.  We look forward to working together with you in the future.    Sincerely,  Lesley Huerta, [facility]

## 2022-08-20 NOTE — PLAN OF CARE
Occupational Therapy Discharge Summary    Reason for therapy discharge:    Discharged to home.    Progress towards therapy goal(s). See goals on Care Plan in [facility] electronic health record for goal details.  Goals met    Therapy recommendation(s):    No further therapy is recommended.

## 2022-08-20 NOTE — PROGRESS NOTES
08/20/22 1431   Signing Clinician's Name / Credentials   Signing clinician's name / credentials Jennifer Kaufman OTR/L   Quick Adds   Rehab Discipline OT   Gait Training   [place] Level (Gait Training) stand-by assist   Physical Assistance Level (Gait Training) supervision   Toilet Training   [place] Level (Toilet Training) stand-by assist   OT Discharge Planning   OT Discharge Recommendation (DC Rec) home with assist   OT Rationale for DC Rec no further OT needs at this time   OT Brief overview of current status Pt has progressed well over course of stay, has assist at home from spouse as needed, no further OT needs.   Additional Documentation   OT Plan d/c home with spouse   Total Session Time   Total Session Time (minutes) 15 minutes

## 2022-08-20 NOTE — PLAN OF CARE
Patient is pleasant vitals are stable. Pain has been fairly well controlled. Patient is discharging.     Discharge Note      Data:  Aden Rosa discharged to home at 1220 via wheel chair. Accompanied by staff.    Action:  Written discharge/follow-up instructions were provided to patient and spouse. Prescriptions - None ordered for discharge. All belongings sent with patient.    Response:  Patient and wife verbalized understanding of discharge instructions, reason for discharge, and necessary follow-up appointments.

## 2022-08-20 NOTE — PLAN OF CARE
Patient is alert and oriented x4. Pleasant and cooperative. VSS. Denies nausea. C/O pain in back. Utilized PRN oxycodone x2, which is effective. Patient has chronic pain. Does have SOB with exertion. Utilizing O2 at 2 LPM, which is his baseline. Patient requested not to be interrupted unless needed d/t not getting much sleep the previous night. Appetite has been poor but stated this is not new. Encouraged to drink fluids. Will continue to monitor and update MD as appropriate.     Goal Outcome Evaluation:    Plan of Care Reviewed With: patient     Overall Patient Progress: improving

## 2022-08-20 NOTE — PHARMACY - DISCHARGE MEDICATION RECONCILIATION AND EDUCATION
Pharmacy:  Discharge Counseling and Medication Reconciliation    Aden Rosa  [address] [place] [id number] [phone] (home)   82 year old male  PCP: Andreas Langley    Allergies: Dristan, Hydrocodone-homatropine, Hydromet [hydrocodone w/homatropine], Prochlorperazine, and Prochlorperazine maleate    Discharge Counseling:    Pharmacist met with patient and wife today to review the medication portion of the After Visit Summary (with an emphasis on STOPPED medications) and to address patient's questions/concerns.    Summary of Education: Reviewed stopping chlorthalidone and lisinopril      Discharge Medication Reconciliation:    It has been determined that the patient has an adequate supply of medications available or which can be obtained from the patient's preferred pharmacy, which both has confirmed as: [facility]. An updated medication list will be faxed to the patient's pharmacy.    Thank you for the consult.    Lesley Huerta [facility]........August 20, 2022 12:26 PM

## 2022-08-20 NOTE — PROGRESS NOTES
08/20/22 1200   Signing Clinician's Name / Credentials   Signing clinician's name / credentials Yahir Plasencia DPT   Quick Adds   Rehab Discipline PT   Quick Adds Interventions Mobility   Gait Training   Symptoms Noted During/After Treatment (Gait Training) fatigue;shortness of breath   Distance in Feet (Required for LE Total Joints) 450 feet   Treatment Detail Ambulation in hallway today with 2 L of oxygen, his walking stick, and CGA just for safety. Patient showed now signs of loss of balance or unsteadiness   [place] Level (Gait Training) contact guard   Physical Assistance Level (Gait Training) 1 person assist   Weight Bearing (Gait Training) full weight-bearing   Assistive Device (Gait Training) other (see comments)  (Walking stick)   Gait Analysis Deviations decreased jones   PT Discharge Planning   PT Discharge Recommendation (DC Rec) home   PT Rationale for DC Rec Patient feels that he is very near his baseline and isn't going to need any therapies at this time. He has help at home and voices confidence to manage his oxygen at home.   PT Brief overview of current status CGA for mobilities. Patient did not lose balance or show any unsteadiness today as he was managing his own O2 lines and walking stick. Able to ambulate approximately 450 feet with CGA-SBA.   Additional Documentation   PT Plan continue PT   Total Session Time   Total Session Time (minutes) 15 minutes

## 2022-08-20 NOTE — DISCHARGE SUMMARY
[facility]  Hospitalist Discharge Summary      Date of Admission:  8/17/2022  Date of Discharge:  8/20/2022  Discharging Provider: Satish Alvarado MD  Discharge Service: Hospitalist Service    Discharge Diagnoses   Hypovolemic shock  Acute kidney injury  Narcotics/gabapentin withdrawal    Follow-ups Needed After Discharge   Follow-up Appointments     Follow-up and recommended labs and tests       Follow up with primary care provider, Andreas Langley, within 7   days for hospital follow- up.             Unresulted Labs Ordered in the Past 30 Days of this Admission     No orders found from 7/18/2022 to 8/18/2022.      These results will be followed up by NA    Discharge Disposition   Discharged to home  Condition at discharge: Good      Hospital Course               Aden Rosa is a 82 year old male admitted on 8/17/2022. He presented with a syncopal spell.  Patient was initially found to be in hypovolemic shock.  He required norepinephrine to maintain his blood pressures.  He was also found to have acute kidney injury likely due to prerenal azotemia from poor oral intake as well as diuretic.  Patient was fluid resuscitated and blood pressures normalized.  Renal function also returned back to baseline.    Unfortunately yesterday he had signs and symptoms of narcotic withdrawal as well as potentially some withdrawal from gabapentin being held.  He was restarted on home dosages of both of those medications and today feels much much better.  Yesterday he had generalized weakness but was able to ambulate 75 feet prior to tiring.  Today he feels that he is back to his baseline functional status and activity tolerance.  He does not think that he needs home care or outpatient physical therapy.    His blood pressure was elevated yesterday as we initially discontinued lisinopril, hydrochlorothiazide and diltiazem.  Diltiazem was restarted and blood pressure did improve significantly.  Still  borderline high.  I would hold off on restarting the other 2 medications for now but in clinic follow-up could consider restarting lisinopril at low-dose if needed for blood pressure control.    Patient had very minimal elevation in white blood cell count while in the hospital but no evidence of infection.  He continued to have mildly macrocytic anemia with no evidence of bleeding or symptoms of anemia.  Also mild thrombocytopenia with no evidence of bleeding or interval worsening.         Consultations This Hospital Stay   SOCIAL WORK IP CONSULT  PHYSICAL THERAPY ADULT IP CONSULT  OCCUPATIONAL THERAPY ADULT IP CONSULT    Code Status   Full Code    Time Spent on this Encounter   I, Satish Alvarado MD, personally saw the patient today and spent greater than 30 minutes discharging this patient.       Satish Alvarado MD  [facility]  [address]   [place] [address]  Phone: [phone]  Fax: [phone]  ______________________________________________________________________    Physical Exam   Vital Signs: Temp: 98  F (36.7  C) Temp src: Tympanic BP: (!) 155/75 Pulse: 72   Resp: 16 SpO2: 97 % O2 Device: Nasal cannula Oxygen Delivery: 2 LPM  Weight: 205 lbs 8 oz  Constitutional: awake, alert, cooperative, no apparent distress, and appears stated age  Respiratory: No increased work of breathing, good air exchange, clear to auscultation bilaterally, no crackles or wheezing  Cardiovascular: Regularrate and rhythm.  No murmurs rubs or gallops heard.   GI: Abdomen Soft, non-tender.  No masses, rebound or guarding.   Musculoskeletal: No synovitis.  Muscle strength age and body habitus appropriateas well as equal and even.   Neuropsychiatric: General: normal, calm and normal eye contact  Orientation: oriented to self, place, time and situation  Memory and insight: memory for past and recent events intact and thought process normal       Primary Care Physician   Andreas Walker  Shivam    Discharge Orders      Follow-up and recommended labs and tests     Follow up with primary care provider, Andreas Langley, within 7 days for hospital follow- up.     Activity    Your activity upon discharge: activity as tolerated     Reason for your hospital stay    You came in with low blood pressure and kidneys.  This is most likely due to hydrochlorothiazide lisinopril.  We stopped your blood pressure medications and your symptoms resolved.  Unfortunately you developed withdrawal with your gabapentin stopped and reduction in narcotics.  I restarted your home doses yesterday and today you felt much better.  Also restarted your diltiazem which helped your blood pressure.  You should have your blood pressure rechecked in clinic with consideration of restarting lower dose lisinopril depending on how things go here over the next week.     Diet    Follow this diet upon discharge: Orders Placed This Encounter      Combination Diet Regular Diet Adult       Significant Results and Procedures   Most Recent 3 CBC's:Recent Labs   Lab Test 08/19/22  0541 08/18/22  0555 08/17/22  1122   WBC 12.4* 13.5* 11.4*   HGB 8.6* 8.5* 9.4*   * 104* 101*   * 105* 149*     Most Recent 3 BMP's:Recent Labs   Lab Test 08/19/22  0541 08/18/22  0555 08/17/22  1122    136 135   POTASSIUM 4.4 4.3 3.9   CHLORIDE 106 108* 104   CO2 23 21 22   BUN 24 31* 32*   CR 1.56* 2.15* 3.31*   ANIONGAP 8 7 9   OLAYINKA 9.0 8.2* 8.8   GLC 95 92 126*   ,   Results for orders placed or performed during the hospital encounter of 08/17/22   XR Chest Port 1 View    Narrative    PROCEDURE: XR CHEST PORT 1 VIEW 8/17/2022 10:48 AM    HISTORY: unresponsive    COMPARISONS: 10/29/2019.    TECHNIQUE: Single portable view.    FINDINGS: Level of inspiration is quite low. Heart appears enlarged  but is probably similar to the prior exam. There is some abnormal  increased density at the right lung base, probably due to atelectasis  given low  level of inspiration. It does appear different than on the  prior exam. No definite infiltrate is seen on the left. No definite  effusion is seen.         Impression    IMPRESSION: Probable atelectasis at the right lung base. Focal  pneumonia is difficult to exclude.    Changes at the left lung base appear more chronic.    MANDA WAGNER MD         SYSTEM ID:  [id number]   CT Head w/o Contrast    Narrative    EXAM: CT HEAD W/O CONTRAST 8/17/2022 11:43 AM    PROVIDED HISTORY: change in mental status- known stage 4 melanoma,  concern for metastatic disease to brain    COMPARISON: Bone scan 7/1/2010    TECHNIQUE:   Imaging protocol: Multiplanar CT images of the head without  intravenous contrast.   Acquisition: This CT exam was performed using one or more the  following dose reduction techniques: automated exposure control,  adjustment of the mA and/or kV according to patient size, and/or  iterative reconstruction technique.    FINDINGS:  No intracranial hemorrhage, mass effect, or midline shift. The  ventricles are proportionate to the cerebral sulci. Scattered patchy  foci of hypodensity in the periventricular and supraventricular white  matter, which is nonspecific, likely related to chronic small vessel  ischemic disease given the patient's age. Mild general parenchymal  volume loss. No acute loss of gray-white matter differentiation.    No acute osseous abnormality. Multifocal lytic lesions throughout the  calvarium, including 2.3 x 2.2 cm lesion in the left parietal lobe  (series 3 image 29), new since comparison bone scan 7/1/2010. Mild  paranasal sinus mucosal thickening. Mastoid air cells are clear. The  orbits are grossly unremarkable.       Impression    IMPRESSION:  1.  No acute intracranial abnormality or intracranial mass lesion.  2.  Multifocal lytic calvarial lesions, concerning for metastatic  disease.    ERNESTO STAFFORD MD         SYSTEM ID:  [id number]       Discharge Medications   Current Discharge  Medication List      CONTINUE these medications which have NOT CHANGED    Details   aspirin (ASA) 81 MG chewable tablet Take 81 mg by mouth daily      calcium carbonate 500 mg (elemental) 1250 (500 Ca) MG tablet chewable Take 500 mg by mouth daily      cyanocobalamin (VITAMIN B-12) 100 MCG tablet Take 100 mcg by mouth daily      diphenhydrAMINE (BENADRYL) 50 MG capsule Take 50 mg by mouth every 6 hours as needed for itching or allergies      folic acid (FOLVITE) 1 MG tablet Take 1 mg by mouth daily      gabapentin (NEURONTIN) 300 MG capsule Take 900 mg by mouth 3 times daily      omeprazole (PRILOSEC) 20 MG DR capsule Take 20 mg by mouth daily      acyclovir (ZOVIRAX) 400 MG tablet Take 400 mg by mouth 2 times daily At 7am and 7pm      diltiazem ER (DILT-XR) 120 MG 24 hr capsule Take 120 mg by mouth daily      diphenoxylate-atropine (LOMOTIL) 2.5-0.025 MG tablet Take 1 tablet by mouth 4 times daily as needed      FERROUS SULFATE PO Take 325 mg by mouth daily      loperamide (IMODIUM A-D) 2 MG tablet Take 2 mg by mouth 3 times daily      oxyCODONE (OXYCONTIN) 10 MG 12 hr tablet Per [employer], takes 2 tabs at 7 AM, 1 tab at Noon, and 1 tab at 7 PM.      oxyCODONE (ROXICODONE) 5 MG tablet TAKE 1 TABLET BY MOUTH EVERY 6 HOURS AS NEEDED FOR PAIN      tamsulosin (FLOMAX) 0.4 MG capsule Take 0.4 mg by mouth every evening at 7pm         STOP taking these medications       aspirin 81 MG EC tablet Comments:   Reason for Stopping:         chlorthalidone (HYGROTON) 25 MG tablet Comments:   Reason for Stopping:         lisinopril (ZESTRIL) 20 MG tablet Comments:   Reason for Stopping:             Allergies   Allergies   Allergen Reactions     Dristan      Hydrocodone-Homatropine      Unable to void     Hydromet [Hydrocodone W/Homatropine]      Prochlorperazine      Prochlorperazine Maleate      Compazine

## 2022-08-22 ENCOUNTER — PATIENT OUTREACH (OUTPATIENT)
Dept: FAMILY MEDICINE | Facility: OTHER | Age: 82
End: 2022-08-22

## 2022-08-22 NOTE — TELEPHONE ENCOUNTER
Patient has PCP elsewhere, no follow-up here. No TCM call required per policy.'    Elle Ramachandran RN on 8/22/2022 at 11:41 AM

## 2022-08-30 ENCOUNTER — PATIENT OUTREACH (OUTPATIENT)
Dept: FAMILY MEDICINE | Facility: OTHER | Age: 82
End: 2022-08-30

## 2022-08-30 RX ORDER — LISINOPRIL 20 MG/1
20 TABLET ORAL DAILY
COMMUNITY
End: 2024-03-06

## 2022-08-30 NOTE — TELEPHONE ENCOUNTER
30 day medication review complete. Patient reported starting LISINOPRIL 20 MG TAB 1 TAB DAILY per Dr. Martinez as of last week. Shey Roberts RN on 8/30/2022 at 9:56 AM

## 2022-08-30 NOTE — TELEPHONE ENCOUNTER
Post Medication Reconciliation Status: Discharge medications reconciled and changed, see notes/orders

## 2022-11-16 ENCOUNTER — HOSPITAL ENCOUNTER (OUTPATIENT)
Dept: PET IMAGING | Facility: OTHER | Age: 82
Discharge: HOME | End: 2022-11-16
Attending: INTERNAL MEDICINE | Admitting: FAMILY MEDICINE
Payer: MEDICARE

## 2022-11-16 DIAGNOSIS — C90.01 MULTIPLE MYELOMA IN REMISSION (H): ICD-10-CM

## 2022-11-16 PROCEDURE — 343N000001 HC RX 343: Performed by: INTERNAL MEDICINE

## 2022-11-16 PROCEDURE — 78815 PET IMAGE W/CT SKULL-THIGH: CPT | Mod: PI

## 2022-11-16 PROCEDURE — A9552 F18 FDG: HCPCS | Performed by: INTERNAL MEDICINE

## 2022-11-16 RX ADMIN — FLUDEOXYGLUCOSE F-18 12.35 MCI.: 500 INJECTION, SOLUTION INTRAVENOUS at 15:14

## 2024-03-06 ENCOUNTER — APPOINTMENT (OUTPATIENT)
Dept: GENERAL RADIOLOGY | Facility: OTHER | Age: 84
End: 2024-03-06
Attending: FAMILY MEDICINE
Payer: MEDICARE

## 2024-03-06 ENCOUNTER — HOSPITAL ENCOUNTER (EMERGENCY)
Facility: OTHER | Age: 84
Discharge: ANOTHER HOSPITAL | End: 2024-03-06
Attending: FAMILY MEDICINE | Admitting: FAMILY MEDICINE
Payer: MEDICARE

## 2024-03-06 VITALS
DIASTOLIC BLOOD PRESSURE: 64 MMHG | WEIGHT: 215 LBS | SYSTOLIC BLOOD PRESSURE: 153 MMHG | RESPIRATION RATE: 25 BRPM | BODY MASS INDEX: 33.67 KG/M2 | HEART RATE: 89 BPM | TEMPERATURE: 101.3 F | OXYGEN SATURATION: 89 %

## 2024-03-06 DIAGNOSIS — Z85.79 H/O MULTIPLE MYELOMA: ICD-10-CM

## 2024-03-06 DIAGNOSIS — R06.03 ACUTE RESPIRATORY DISTRESS: ICD-10-CM

## 2024-03-06 LAB
ACANTHOCYTES BLD QL SMEAR: NORMAL
ALBUMIN SERPL BCG-MCNC: 3.5 G/DL (ref 3.5–5.2)
ALLEN'S TEST: NO
ALP SERPL-CCNC: 102 U/L (ref 40–150)
ALT SERPL W P-5'-P-CCNC: 77 U/L (ref 0–70)
ANION GAP SERPL CALCULATED.3IONS-SCNC: 16 MMOL/L (ref 7–15)
AST SERPL W P-5'-P-CCNC: ABNORMAL U/L
AUER BODIES BLD QL SMEAR: NORMAL
BASE EXCESS BLDA CALC-SCNC: 1 MMOL/L (ref -3–3)
BASO STIPL BLD QL SMEAR: NORMAL
BASOPHILS # BLD AUTO: 0 10E3/UL (ref 0–0.2)
BASOPHILS NFR BLD AUTO: 1 %
BILIRUB SERPL-MCNC: 1.4 MG/DL
BITE CELLS BLD QL SMEAR: NORMAL
BLISTER CELLS BLD QL SMEAR: NORMAL
BUN SERPL-MCNC: 20.6 MG/DL (ref 8–23)
BURR CELLS BLD QL SMEAR: NORMAL
CALCIUM SERPL-MCNC: 8.3 MG/DL (ref 8.8–10.2)
CHLORIDE SERPL-SCNC: 98 MMOL/L (ref 98–107)
COHGB MFR BLD: 89.2 % (ref 95–96)
CREAT SERPL-MCNC: 1.31 MG/DL (ref 0.67–1.17)
DACRYOCYTES BLD QL SMEAR: NORMAL
DEPRECATED HCO3 PLAS-SCNC: 22 MMOL/L (ref 22–29)
EGFRCR SERPLBLD CKD-EPI 2021: 54 ML/MIN/1.73M2
ELLIPTOCYTES BLD QL SMEAR: NORMAL
EOSINOPHIL # BLD AUTO: 0 10E3/UL (ref 0–0.7)
EOSINOPHIL NFR BLD AUTO: 0 %
ERYTHROCYTE [DISTWIDTH] IN BLOOD BY AUTOMATED COUNT: 21.8 % (ref 10–15)
FLUAV RNA SPEC QL NAA+PROBE: NEGATIVE
FLUBV RNA RESP QL NAA+PROBE: NEGATIVE
FRAGMENTS BLD QL SMEAR: NORMAL
GIANT PLATELETS BLD QL SMEAR: NORMAL
GLUCOSE SERPL-MCNC: 119 MG/DL (ref 70–99)
HCO3 BLD-SCNC: 25 MMOL/L (ref 21–28)
HCT VFR BLD AUTO: 27.8 % (ref 40–53)
HGB BLD-MCNC: 9.1 G/DL (ref 13.3–17.7)
HGB C CRYSTALS: NORMAL
HOLD SPECIMEN: NORMAL
HOWELL-JOLLY BOD BLD QL SMEAR: NORMAL
IMM GRANULOCYTES # BLD: 0 10E3/UL
IMM GRANULOCYTES NFR BLD: 1 %
LACTATE SERPL-SCNC: 1.8 MMOL/L (ref 0.7–2)
LYMPHOCYTES # BLD AUTO: 0.2 10E3/UL (ref 0–5.3)
LYMPHOCYTES NFR BLD AUTO: 12 %
MCH RBC QN AUTO: 32.7 PG (ref 26.5–33)
MCHC RBC AUTO-ENTMCNC: 32.7 G/DL (ref 31.5–36.5)
MCV RBC AUTO: 100 FL (ref 78–100)
MONOCYTES # BLD AUTO: 0.4 10E3/UL (ref 0–1.3)
MONOCYTES NFR BLD AUTO: 23 %
NEUTROPHILS # BLD AUTO: 1.2 10E3/UL (ref 1.6–8.3)
NEUTROPHILS NFR BLD AUTO: 64 %
NEUTS HYPERSEG BLD QL SMEAR: NORMAL
NRBC # BLD AUTO: 0 10E3/UL
NRBC BLD AUTO-RTO: 0 /100
NT-PROBNP SERPL-MCNC: 3256 PG/ML (ref 0–1800)
O2/TOTAL GAS SETTING VFR VENT: 44 %
PATH REV: NORMAL
PCO2 BLD: 34 MM HG (ref 35–45)
PH BLD: 7.47 [PH] (ref 7.35–7.45)
PLAT MORPH BLD: NORMAL
PLATELET # BLD AUTO: 79 10E3/UL (ref 150–450)
PO2 BLD: 58 MM HG (ref 80–105)
POLYCHROMASIA BLD QL SMEAR: NORMAL
POTASSIUM SERPL-SCNC: 4.2 MMOL/L (ref 3.4–5.3)
PROCALCITONIN SERPL IA-MCNC: 0.3 NG/ML
PROT SERPL-MCNC: 6.1 G/DL (ref 6.4–8.3)
RBC # BLD AUTO: 2.78 10E6/UL (ref 4.4–5.9)
RBC AGGLUT BLD QL: NORMAL
RBC MORPH BLD: NORMAL
ROULEAUX BLD QL SMEAR: NORMAL
RSV RNA SPEC NAA+PROBE: POSITIVE
SAO2 % BLDA: 87 % (ref 92–100)
SARS-COV-2 RNA RESP QL NAA+PROBE: NEGATIVE
SICKLE CELLS BLD QL SMEAR: NORMAL
SMUDGE CELLS BLD QL SMEAR: NORMAL
SODIUM SERPL-SCNC: 136 MMOL/L (ref 135–145)
SPHEROCYTES BLD QL SMEAR: NORMAL
STOMATOCYTES BLD QL SMEAR: NORMAL
TARGETS BLD QL SMEAR: NORMAL
TOXIC GRANULES BLD QL SMEAR: NORMAL
TROPONIN T SERPL HS-MCNC: 71 NG/L
TROPONIN T SERPL HS-MCNC: 92 NG/L
VARIANT LYMPHS BLD QL SMEAR: NORMAL
WBC # BLD AUTO: 1.8 10E3/UL (ref 4–11)

## 2024-03-06 PROCEDURE — 84145 PROCALCITONIN (PCT): CPT | Performed by: FAMILY MEDICINE

## 2024-03-06 PROCEDURE — 258N000003 HC RX IP 258 OP 636: Performed by: FAMILY MEDICINE

## 2024-03-06 PROCEDURE — 96361 HYDRATE IV INFUSION ADD-ON: CPT

## 2024-03-06 PROCEDURE — 999N000157 HC STATISTIC RCP TIME EA 10 MIN

## 2024-03-06 PROCEDURE — 87040 BLOOD CULTURE FOR BACTERIA: CPT | Performed by: FAMILY MEDICINE

## 2024-03-06 PROCEDURE — 96360 HYDRATION IV INFUSION INIT: CPT

## 2024-03-06 PROCEDURE — 93010 ELECTROCARDIOGRAM REPORT: CPT | Performed by: INTERNAL MEDICINE

## 2024-03-06 PROCEDURE — 250N000013 HC RX MED GY IP 250 OP 250 PS 637: Performed by: FAMILY MEDICINE

## 2024-03-06 PROCEDURE — 85025 COMPLETE CBC W/AUTO DIFF WBC: CPT | Performed by: FAMILY MEDICINE

## 2024-03-06 PROCEDURE — 36600 WITHDRAWAL OF ARTERIAL BLOOD: CPT | Performed by: FAMILY MEDICINE

## 2024-03-06 PROCEDURE — 250N000009 HC RX 250: Performed by: FAMILY MEDICINE

## 2024-03-06 PROCEDURE — 94640 AIRWAY INHALATION TREATMENT: CPT

## 2024-03-06 PROCEDURE — 99291 CRITICAL CARE FIRST HOUR: CPT | Performed by: FAMILY MEDICINE

## 2024-03-06 PROCEDURE — 36415 COLL VENOUS BLD VENIPUNCTURE: CPT | Performed by: FAMILY MEDICINE

## 2024-03-06 PROCEDURE — 71045 X-RAY EXAM CHEST 1 VIEW: CPT

## 2024-03-06 PROCEDURE — 83605 ASSAY OF LACTIC ACID: CPT | Performed by: FAMILY MEDICINE

## 2024-03-06 PROCEDURE — 93005 ELECTROCARDIOGRAM TRACING: CPT

## 2024-03-06 PROCEDURE — 99285 EMERGENCY DEPT VISIT HI MDM: CPT | Mod: 25

## 2024-03-06 PROCEDURE — 87637 SARSCOV2&INF A&B&RSV AMP PRB: CPT | Performed by: FAMILY MEDICINE

## 2024-03-06 PROCEDURE — 82805 BLOOD GASES W/O2 SATURATION: CPT | Performed by: FAMILY MEDICINE

## 2024-03-06 PROCEDURE — 83880 ASSAY OF NATRIURETIC PEPTIDE: CPT | Performed by: FAMILY MEDICINE

## 2024-03-06 PROCEDURE — 84155 ASSAY OF PROTEIN SERUM: CPT | Performed by: FAMILY MEDICINE

## 2024-03-06 PROCEDURE — 84484 ASSAY OF TROPONIN QUANT: CPT | Mod: 91 | Performed by: FAMILY MEDICINE

## 2024-03-06 RX ORDER — OXYCODONE HCL 10 MG/1
10 TABLET, FILM COATED, EXTENDED RELEASE ORAL
Status: DISCONTINUED | OUTPATIENT
Start: 2024-03-06 | End: 2024-03-06 | Stop reason: HOSPADM

## 2024-03-06 RX ORDER — DEXAMETHASONE 4 MG/1
20 TABLET ORAL WEEKLY
COMMUNITY
Start: 2024-02-05

## 2024-03-06 RX ORDER — LIDOCAINE HYDROCHLORIDE AND EPINEPHRINE 10; 10 MG/ML; UG/ML
5 INJECTION, SOLUTION INFILTRATION; PERINEURAL ONCE
Status: COMPLETED | OUTPATIENT
Start: 2024-03-06 | End: 2024-03-06

## 2024-03-06 RX ORDER — SULFAMETHOXAZOLE/TRIMETHOPRIM 800-160 MG
1 TABLET ORAL
COMMUNITY

## 2024-03-06 RX ORDER — LENALIDOMIDE 10 MG/1
10 CAPSULE ORAL
COMMUNITY
Start: 2024-03-04 | End: 2024-03-18

## 2024-03-06 RX ORDER — IPRATROPIUM BROMIDE AND ALBUTEROL SULFATE 2.5; .5 MG/3ML; MG/3ML
3 SOLUTION RESPIRATORY (INHALATION) ONCE
Status: COMPLETED | OUTPATIENT
Start: 2024-03-06 | End: 2024-03-06

## 2024-03-06 RX ORDER — DILTIAZEM HYDROCHLORIDE 240 MG/1
240 CAPSULE, EXTENDED RELEASE ORAL DAILY
COMMUNITY

## 2024-03-06 RX ORDER — GABAPENTIN 300 MG/1
900 CAPSULE ORAL ONCE
Status: COMPLETED | OUTPATIENT
Start: 2024-03-06 | End: 2024-03-06

## 2024-03-06 RX ADMIN — GABAPENTIN 900 MG: 300 CAPSULE ORAL at 15:41

## 2024-03-06 RX ADMIN — SODIUM CHLORIDE 500 ML: 9 INJECTION, SOLUTION INTRAVENOUS at 11:05

## 2024-03-06 RX ADMIN — LIDOCAINE HYDROCHLORIDE,EPINEPHRINE BITARTRATE 5 ML: 10; .01 INJECTION, SOLUTION INFILTRATION; PERINEURAL at 16:07

## 2024-03-06 RX ADMIN — OXYCODONE HYDROCHLORIDE 10 MG: 10 TABLET, FILM COATED, EXTENDED RELEASE ORAL at 15:41

## 2024-03-06 RX ADMIN — IPRATROPIUM BROMIDE AND ALBUTEROL SULFATE 3 ML: .5; 3 SOLUTION RESPIRATORY (INHALATION) at 11:10

## 2024-03-06 ASSESSMENT — COLUMBIA-SUICIDE SEVERITY RATING SCALE - C-SSRS
2. HAVE YOU ACTUALLY HAD ANY THOUGHTS OF KILLING YOURSELF IN THE PAST MONTH?: NO
1. IN THE PAST MONTH, HAVE YOU WISHED YOU WERE DEAD OR WISHED YOU COULD GO TO SLEEP AND NOT WAKE UP?: NO
6. HAVE YOU EVER DONE ANYTHING, STARTED TO DO ANYTHING, OR PREPARED TO DO ANYTHING TO END YOUR LIFE?: NO

## 2024-03-06 ASSESSMENT — ACTIVITIES OF DAILY LIVING (ADL)
ADLS_ACUITY_SCORE: 40

## 2024-03-06 ASSESSMENT — ENCOUNTER SYMPTOMS
NAUSEA: 0
SHORTNESS OF BREATH: 1
DYSURIA: 0
VOMITING: 0
MYALGIAS: 1
CONFUSION: 0
CHILLS: 1
ABDOMINAL PAIN: 0
FEVER: 1
COUGH: 1

## 2024-03-06 NOTE — ED PROVIDER NOTES
History     Chief Complaint   Patient presents with    Cough    Fever    Shortness of Breath     HPI  Aden Rosa is a 84 year old male with history of multiple myeloma, stem cell transplant, hypertension, recurrent pneumonia, sepsis, SUSY who presents to the emergency department with cough worsening over the last week.  High fever this morning.  Increasing oxygen requirement.  Patient does not endorse chest pain.  Patient had been in remission for myeloma for some time and recently had relapse.  Patient is back doing full course treatment.  He just had his last treatment on Monday.  Normally seen every Monday by oncology in [place].  Patient has been admitted in the past both in [place] and at [facility] for pneumonia.    Reviewed nurses notes below, similar history is related to me.     Expand All Collapse All    Pt arrives via private vehicle with c/o cough for one week. Fever started this morning. Pt is chronically on O2 at 2L/NC. Pt was 77% on 2L.         Allergies:  Allergies   Allergen Reactions    Hydrocodone Bit-Homatrop Mbr      Unable to void    Hydromet [Hydrocodone W/Homatropine]     Pheniramine-Phenylephrine     Prochlorperazine     Prochlorperazine Maleate      Compazine         Problem List:    Patient Active Problem List    Diagnosis Date Noted    Hypotension, unspecified hypotension type 08/17/2022     Priority: Medium    Acute renal failure superimposed on stage 3 chronic kidney disease, unspecified acute renal failure type, unspecified whether stage 3a or 3b CKD (H) 08/17/2022     Priority: Medium    Hypovolemia dehydration 08/17/2022     Priority: Medium    Acute renal failure superimposed on stage 3 chronic kidney disease (H) 08/13/2019     Priority: Medium    CAP (community acquired pneumonia) 08/10/2019     Priority: Medium    Community acquired pneumonia of right lower lobe of lung 03/31/2019     Priority: Medium    SIRS (systemic inflammatory response syndrome) (H) 03/31/2019      Priority: Medium    Sepsis due to pneumonia (H) 2019     Priority: Medium    Kidney stones      Priority: Medium    Spinal stenosis      Priority: Medium     with chronic back pain      Hypertension      Priority: Medium    Multiple myeloma (H) 2011     Priority: Medium     updating diagnosis code for icd10 cutover      Encounter for long-term current use of medication 2011     Priority: Medium     updating diagnosis code for icd10 cutover      Stem cells transplant status (H) 2011     Priority: Medium     [facility]           Past Medical History:    Past Medical History:   Diagnosis Date    Hypertension     Kidney stones     Multiple myeloma (H)     Spinal stenosis     Stem cells transplant status (H)        Past Surgical History:    Past Surgical History:   Procedure Laterality Date    BACK SURGERY      Microsurgery    CHOLECYSTECTOMY      HEMORRHOIDECTOMY      x2    ZZC TOTAL KNEE ARTHROPLASTY Right        Family History:    Family History   Problem Relation Age of Onset    Cancer Other         hodgkins    Diabetes Other     Heart Disease Father 82        disease    Other - See Comments Other     Alzheimer Disease Mother          at 97    Diabetes Type 2  Sister        Social History:  Marital Status:   [2]  Social History     Tobacco Use    Smoking status: Never    Smokeless tobacco: Never   Substance Use Topics    Alcohol use: No    Drug use: No        Medications:    acyclovir (ZOVIRAX) 400 MG tablet  aspirin (ASA) 81 MG chewable tablet  cyanocobalamin (VITAMIN B-12) 100 MCG tablet  dexAMETHasone (DECADRON) 4 MG tablet  diltiazem ER (TIAZAC) 240 MG 24 hr ER beaded capsule  diphenoxylate-atropine (LOMOTIL) 2.5-0.025 MG tablet  FERROUS SULFATE PO  folic acid (FOLVITE) 1 MG tablet  gabapentin (NEURONTIN) 300 MG capsule  LENalidomide (REVLIMID) 10 MG CAPS capsule  omeprazole (PRILOSEC) 20 MG DR capsule  oxyCODONE (OXYCONTIN) 10 MG 12 hr tablet  oxyCODONE (ROXICODONE)  5 MG tablet  sulfamethoxazole-trimethoprim (BACTRIM DS) 800-160 MG tablet  tamsulosin (FLOMAX) 0.4 MG capsule          Review of Systems   Constitutional:  Positive for chills and fever.   HENT:  Positive for congestion.    Respiratory:  Positive for cough and shortness of breath.    Gastrointestinal:  Negative for abdominal pain, nausea and vomiting.   Genitourinary:  Negative for dysuria.   Musculoskeletal:  Positive for myalgias.   Psychiatric/Behavioral:  Negative for confusion.        Physical Exam   BP: (!) 173/74  Pulse: 102  Temp: (!) 101.3  F (38.5  C)  Resp: 26  Weight: 97.5 kg (215 lb) (stated)  SpO2: (!) 77 %      Physical Exam  Vitals and nursing note reviewed.   Constitutional:       General: He is not in acute distress.     Appearance: Normal appearance. He is not toxic-appearing.   HENT:      Head: Atraumatic.   Eyes:      General: No scleral icterus.     Conjunctiva/sclera: Conjunctivae normal.   Cardiovascular:      Rate and Rhythm: Regular rhythm. Tachycardia present.      Heart sounds: Normal heart sounds.   Pulmonary:      Effort: Respiratory distress present.      Breath sounds: Wheezing present.   Abdominal:      Palpations: Abdomen is soft.      Tenderness: There is no abdominal tenderness.   Musculoskeletal:         General: No deformity.      Cervical back: Neck supple.      Right lower leg: Edema present.      Left lower leg: Edema present.   Skin:     General: Skin is warm.      Findings: No rash.   Neurological:      General: No focal deficit present.      Mental Status: He is alert.         Critical Care time:  was 45 minutes for this patient excluding procedures.    EKG: Sinus rhythm, LVH, nonspecific ST-T wave abnormalities.,  Rate 96, significant baseline artifact.    Results for orders placed or performed during the hospital encounter of 03/06/24 (from the past 24 hour(s))   Blood gas arterial   Result Value Ref Range    pH Arterial 7.47 (H) 7.35 - 7.45    pCO2 Arterial 34 (L) 35 -  45 mm Hg    pO2 Arterial 58 (L) 80 - 105 mm Hg    FIO2 44     Bicarbonate Arterial 25 21 - 28 mmol/L    Base Excess/Deficit Arterial 1.0 -3.0 - 3.0 mmol/L    Fadi's Test No     Oxyhemoglobin Arterial 87 (L) 92 - 100 %    O2 Sat, Arterial 89.2 (L) 95.0 - 96.0 %    Narrative    In healthy individuals, oxyhemoglobin (O2Hb) and oxygen saturation (SO2) are approximately equal. In the presence of dyshemoglobins, oxyhemoglobin can be considerably lower than oxygen saturation.   Stanley Draw    Narrative    The following orders were created for panel order Stanley Draw.  Procedure                               Abnormality         Status                     ---------                               -----------         ------                     Extra Blue Top Tube[[id number]]                              Final result               Extra Red Top Tube[[id number]]                               Final result               Extra Green Top (Lithium...[[id number]]                      Final result               Extra Purple Top Tube[[id number]]                            Final result               Extra Green Top (Lithium...[[id number]]                      Final result                 Please view results for these tests on the individual orders.   CBC with platelets differential    Narrative    The following orders were created for panel order CBC with platelets differential.  Procedure                               Abnormality         Status                     ---------                               -----------         ------                     CBC with platelets and d...[[id number]]  Abnormal            Final result               RBC and Platelet Morphology[[id number]]                      Final result                 Please view results for these tests on the individual orders.   Comprehensive metabolic panel   Result Value Ref Range    Sodium 136 135 - 145 mmol/L    Potassium 4.2 3.4 - 5.3 mmol/L    Carbon Dioxide (CO2) 22 22 - 29  mmol/L    Anion Gap 16 (H) 7 - 15 mmol/L    Urea Nitrogen 20.6 8.0 - 23.0 mg/dL    Creatinine 1.31 (H) 0.67 - 1.17 mg/dL    GFR Estimate 54 (L) >60 mL/min/1.73m2    Calcium 8.3 (L) 8.8 - 10.2 mg/dL    Chloride 98 98 - 107 mmol/L    Glucose 119 (H) 70 - 99 mg/dL    Alkaline Phosphatase 102 40 - 150 U/L    AST      ALT 77 (H) 0 - 70 U/L    Protein Total 6.1 (L) 6.4 - 8.3 g/dL    Albumin 3.5 3.5 - 5.2 g/dL    Bilirubin Total 1.4 (H) <=1.2 mg/dL   Lactic acid whole blood   Result Value Ref Range    Lactic Acid 1.8 0.7 - 2.0 mmol/L   Troponin T, High Sensitivity   Result Value Ref Range    Troponin T, High Sensitivity 71 (H) <=22 ng/L   Procalcitonin   Result Value Ref Range    Procalcitonin 0.30 <0.50 ng/mL   Nt probnp inpatient (BNP)   Result Value Ref Range    N terminal Pro BNP Inpatient 3,256 (H) 0 - 1,800 pg/mL   Extra Blue Top Tube   Result Value Ref Range    Hold Specimen JIC    Extra Red Top Tube   Result Value Ref Range    Hold Specimen JIC    Extra Green Top (Lithium Heparin) Tube   Result Value Ref Range    Hold Specimen JIC    Extra Purple Top Tube   Result Value Ref Range    Hold Specimen JIC    Extra Green Top (Lithium Heparin) ON ICE   Result Value Ref Range    Hold Specimen JIC    CBC with platelets and differential   Result Value Ref Range    WBC Count 1.8 (L) 4.0 - 11.0 10e3/uL    RBC Count 2.78 (L) 4.40 - 5.90 10e6/uL    Hemoglobin 9.1 (L) 13.3 - 17.7 g/dL    Hematocrit 27.8 (L) 40.0 - 53.0 %     78 - 100 fL    MCH 32.7 26.5 - 33.0 pg    MCHC 32.7 31.5 - 36.5 g/dL    RDW 21.8 (H) 10.0 - 15.0 %    Platelet Count 79 (L) 150 - 450 10e3/uL    % Neutrophils 64 %    % Lymphocytes 12 %    % Monocytes 23 %    % Eosinophils 0 %    % Basophils 1 %    % Immature Granulocytes 1 %    NRBCs per 100 WBC 0 <1 /100    Absolute Neutrophils 1.2 (L) 1.6 - 8.3 10e3/uL    Absolute Lymphocytes 0.2 0.0 - 5.3 10e3/uL    Absolute Monocytes 0.4 0.0 - 1.3 10e3/uL    Absolute Eosinophils 0.0 0.0 - 0.7 10e3/uL    Absolute  Basophils 0.0 0.0 - 0.2 10e3/uL    Absolute Immature Granulocytes 0.0 <=0.4 10e3/uL    Absolute NRBCs 0.0 10e3/uL   RBC and Platelet Morphology   Result Value Ref Range    RBC Morphology Confirmed RBC Indices     Platelet Assessment  Automated Count Confirmed. Platelet morphology is normal.     Automated Count Confirmed. Platelet morphology is normal.    Giant Platelets      Acanthocytes      Paul Rods      Basophilic Stippling      Bite Cells      Blister Cells      Milton Cells      Elliptocytes      Hgb C Crystals      Cool-Jolly Bodies      Hypersegmented Neutrophils      Polychromasia      RBC agglutination      RBC Fragments      Reactive Lymphocytes      Rouleaux      Sickle Cells      Smudge Cells      Spherocytes      Stomatocytes      Target Cells      Teardrop Cells      Toxic Neutrophils      Pathologist Review Comments (Blood)     Symptomatic Influenza A/B, RSV, & SARS-CoV2 PCR (COVID-19) Nose    Specimen: Nose; Swab   Result Value Ref Range    Influenza A PCR Negative Negative    Influenza B PCR Negative Negative    RSV PCR Positive (A) Negative    SARS CoV2 PCR Negative Negative    Narrative    Testing was performed using the Xpert Xpress CoV2/Flu/RSV Assay on the [employer] GeneXpert Instrument. This test should be ordered for the detection of SARS-CoV-2, influenza, and RSV viruses in individuals who meet clinical and/or epidemiological criteria. Test performance is unknown in asymptomatic patients. This test is for in vitro diagnostic use under the FDA EUA for laboratories certified under CLIA to perform high or moderate complexity testing. This test has not been FDA cleared or approved. A negative result does not rule out the presence of PCR inhibitors in the specimen or target RNA in concentration below the limit of detection for the assay. If only one viral target is positive but coinfection with multiple targets is suspected, the sample should be re-tested with another FDA cleared, approved, or  authorized test, if coinfection would change clinical management. This test was validated by the [facility]. These laboratories are certified under the Clinical Laboratory Improvement Amendments of 1988 (CLIA-88) as qualified to perform high complexity laboratory testing.   XR Chest Port 1 View    Narrative    Exam:  XR CHEST PORT 1 VIEW    HISTORY: SOB.    COMPARISON:  8/17/2022, 10/29/2019    FINDINGS:     The cardiomediastinal contours are enlarged.      There are diffusely prominent interstitial markings as well as streaky  opacities in the lower lungs. No pleural effusion or pneumothorax.      No acute osseous abnormality.       Impression    IMPRESSION:      Findings compatible with CHF/fluid overload.     There are streaky bibasilar opacities which may be due to edema,  atelectasis, or possibly superimposed pneumonia.      KATELYNN GALE MD         SYSTEM ID:  [id number]   Troponin T, High Sensitivity   Result Value Ref Range    Troponin T, High Sensitivity 92 (H) <=22 ng/L       Medications   oxyCODONE (oxyCONTIN) 12 hr tablet 10 mg (10 mg Oral $Given 3/6/24 1541)   sodium chloride 0.9% BOLUS 500 mL (0 mLs Intravenous Stopped 3/6/24 1248)   ipratropium - albuterol 0.5 mg/2.5 mg/3 mL (DUONEB) neb solution 3 mL (3 mLs Nebulization $Given 3/6/24 1110)   gabapentin (NEURONTIN) capsule 900 mg (900 mg Oral $Given 3/6/24 1541)   lidocaine 1% with EPINEPHrine 1:100,000 injection 5 mL (5 mLs Intradermal $Given 3/6/24 1607)       Assessments & Plan (with Medical Decision Making)     I have reviewed the nursing notes.    I have reviewed the findings, diagnosis, plan and need for follow up with the patient.       Critical Care Addendum  My initial assessment, based on my review of prehospital provider report, review of nursing observations, review of vital signs, focused history, physical exam, review of cardiac rhythm monitor, 12 lead ECG analysis, and discussion with Dr Paul , established a high  suspicion that Aden Rosa has sepsis with indication for early goal-directed therapy and respiratory distress, which requires immediate intervention, and therefore he is critically ill.     After the initial assessment, the care team initiated multiple lab tests and initiated IV fluid administration to provide stabilization care. Due to the critical nature of this patient, I reassessed nursing observations, vital signs, physical exam, review of cardiac rhythm monitor, 12 lead ECG analysis, mental status, neurologic status, and respiratory status multiple times prior to his disposition.     Time also spent performing documentation, discussion with family to obtain medical information for decision making, reviewing test results, discussion with consultants, and coordination of care.     Critical care time (excluding teaching time and procedures): 45 minutes.     11:30 AM--I just checked on him again, no respiratory distress.  Lab was able to get the blood cultures now.  Still some expiratory wheezing after the neb.  Blood pressure was recycled and is 135/56 with a MAP of 88.  Oxygen sats are 90 to 91% on 6 L.  Normally on 2 L at home.  Patient is feeling somewhat better however, is able to finish sentences better and have more conversation.    2:10 PM--I just reevaluated patient, informed him of his upward trending troponin.  Patient actually looks quite a bit better.  His oxygen requirement has not changed here over the course of his ER stay however.  Still remains 6 L and it is usually 2 L at home.  His respiratory distress however is improved, his wheezing has improved.  Patient still does not endorse chest pain.  I did discuss with heart hospitalist, given the depressed cell lines, fever, relapsing multiple myeloma in addition to the upward trending troponin we are uncomfortable keeping the patient here at [facility].  I do feel this patient is likely be better served at a center with higher level of care.   Patient is a little reluctant, he does not like to leave the [facility] however he states he will cooperate with the transfer to [facility].    2:13 PM--currently on hold with [facility] waiting to speak with our hospitalist.      Medical Decision Making  The patient's presentation was of high complexity (a chronic illness severe exacerbation, progression, or side effect of treatment).    The patient's evaluation involved:  history and exam without other MDM data elements    The patient's management necessitated high risk (a decision regarding hospitalization).    Respiratory distress, increasing oxygen requirement, fever: Symptoms likely multifactorial to include RSV pneumonia and possibly new CHF.    2:24 PM--I spoke with Dr. Paul hospitalist at [facility] who graciously accepted patient for transfer.  Patient hemodynamically stable for ground transport.  They should have a bed here fairly shortly, they do have to clean it and turn it over but they are not currently on divert nor will he be waitlisted.    5:48 PM--meds 1 here is now to transport patient at [facility] to call back with a bed.  Patient is still hemodynamically stable for ground transport.  Handoff given to EMS crew    New Prescriptions    No medications on file       Final diagnoses:   Acute respiratory distress   H/O multiple myeloma       3/6/2024   [facility] AND Mario Pratt MD  03/06/24 3869

## 2024-03-06 NOTE — ED TRIAGE NOTES
Pt arrives via private vehicle with c/o cough for one week. Fever started this morning. Pt is chronically on O2 at 2L/NC. Pt was 77% on 2L.      Triage Assessment (Adult)       Row Name 03/06/24 1043          Triage Assessment    Airway WDL WDL        Respiratory WDL    Respiratory WDL X        Skin Circulation/Temperature WDL    Skin Circulation/Temperature WDL WDL        Cardiac WDL    Cardiac WDL WDL        Peripheral/Neurovascular WDL    Peripheral Neurovascular WDL WDL        Cognitive/Neuro/Behavioral WDL    Cognitive/Neuro/Behavioral WDL WDL

## 2024-03-06 NOTE — PHARMACY-ADMISSION MEDICATION HISTORY
Pharmacist Admission Medication History    Admission medication history is complete. The information provided in this note is only as accurate as the sources available at the time of the update.    Information Source(s): Patient, [facility], and Medication list from [place]  via in-person    Pertinent Information: Patient was with wife on interview. Patient wife was a great historian and with the help of a medication list was able to tell what, when, and when last the patient took his medication. Patient and his wife were concern about getting pain medication prior to transferring. These concerns were communicated to providing MD.       Changes made to PTA medication list:  Added:   Dexamethasone  Lenalidomide  Bactrim DS  Deleted:   imodium   Changed:   Diltiazem 240 ER    Allergies reviewed with patient and updates made in EHR: yes    Medication History Completed By: Jose Guadalupe Nath formerly [facility] 3/6/2024 2:31 PM    PTA Med List   Medication Sig Last Dose    acyclovir (ZOVIRAX) 400 MG tablet Take 400 mg by mouth 2 times daily At 7am and 7pm 3/6/2024 at am    aspirin (ASA) 81 MG chewable tablet Take 81 mg by mouth daily 3/6/2024 at am    cyanocobalamin (VITAMIN B-12) 100 MCG tablet Take 100 mcg by mouth daily 3/6/2024 at AM    dexAMETHasone (DECADRON) 4 MG tablet Take 20 mg by mouth once a week Monday 3/4/2024 at AM    diltiazem ER (TIAZAC) 240 MG 24 hr ER beaded capsule Take 240 mg by mouth daily 3/6/2024 at am    diphenoxylate-atropine (LOMOTIL) 2.5-0.025 MG tablet Take 1 tablet by mouth 4 times daily as needed 3/5/2024 at PM    FERROUS SULFATE PO Take 325 mg by mouth daily 3/6/2024 at AM    folic acid (FOLVITE) 1 MG tablet Take 1 mg by mouth daily 3/6/2024 at AM    gabapentin (NEURONTIN) 300 MG capsule Take 900 mg by mouth 3 times daily 3/6/2024 at AM    LENalidomide (REVLIMID) 10 MG CAPS capsule Take 10 mg by mouth 10 mg total by mouth one time a day for 14 days Regimen is 14  days on followed by 7 days off 3/3/2024 at AM    omeprazole (PRILOSEC) 20 MG DR capsule Take 20 mg by mouth daily 3/6/2024 at AM    oxyCODONE (OXYCONTIN) 10 MG 12 hr tablet Per [facility], takes 2 tabs at 7 AM, 1 tab at Noon, and 1 tab at 7 PM. 3/6/2024 at AM    oxyCODONE (ROXICODONE) 5 MG tablet TAKE 1 TABLET BY MOUTH EVERY 6 HOURS AS NEEDED FOR PAIN 3/5/2024    sulfamethoxazole-trimethoprim (BACTRIM DS) 800-160 MG tablet Take 1 tablet by mouth Every Mon, Wed, Fri Morning 3/6/2024 at Am    tamsulosin (FLOMAX) 0.4 MG capsule Take 0.4 mg by mouth every evening at 7pm 3/5/2024 at PM

## 2024-03-07 LAB
ATRIAL RATE - MUSE: 96 BPM
DIASTOLIC BLOOD PRESSURE - MUSE: NORMAL MMHG
INTERPRETATION ECG - MUSE: NORMAL
P AXIS - MUSE: -1 DEGREES
PR INTERVAL - MUSE: 166 MS
QRS DURATION - MUSE: 84 MS
QT - MUSE: 326 MS
QTC - MUSE: 411 MS
R AXIS - MUSE: -16 DEGREES
SYSTOLIC BLOOD PRESSURE - MUSE: NORMAL MMHG
T AXIS - MUSE: -13 DEGREES
VENTRICULAR RATE- MUSE: 96 BPM

## 2024-03-11 LAB
BACTERIA BLD CULT: NO GROWTH
BACTERIA BLD CULT: NO GROWTH

## 2024-06-26 ENCOUNTER — HOSPITAL ENCOUNTER (EMERGENCY)
Facility: OTHER | Age: 84
Discharge: HOME | End: 2024-06-26
Attending: FAMILY MEDICINE | Admitting: FAMILY MEDICINE
Payer: MEDICARE

## 2024-06-26 ENCOUNTER — APPOINTMENT (OUTPATIENT)
Dept: GENERAL RADIOLOGY | Facility: OTHER | Age: 84
End: 2024-06-26
Attending: FAMILY MEDICINE
Payer: MEDICARE

## 2024-06-26 ENCOUNTER — APPOINTMENT (OUTPATIENT)
Dept: CT IMAGING | Facility: OTHER | Age: 84
End: 2024-06-26
Attending: FAMILY MEDICINE
Payer: MEDICARE

## 2024-06-26 VITALS
HEART RATE: 71 BPM | RESPIRATION RATE: 17 BRPM | DIASTOLIC BLOOD PRESSURE: 50 MMHG | OXYGEN SATURATION: 95 % | SYSTOLIC BLOOD PRESSURE: 103 MMHG

## 2024-06-26 DIAGNOSIS — C80.1 CANCER (H): ICD-10-CM

## 2024-06-26 LAB
ALBUMIN SERPL BCG-MCNC: 3.2 G/DL (ref 3.5–5.2)
ALBUMIN UR-MCNC: 10 MG/DL
ALLEN'S TEST: NO
ALP SERPL-CCNC: 67 U/L (ref 40–150)
ALT SERPL W P-5'-P-CCNC: 8 U/L (ref 0–70)
ANION GAP SERPL CALCULATED.3IONS-SCNC: 6 MMOL/L (ref 7–15)
APPEARANCE UR: CLEAR
APTT PPP: 25 SECONDS (ref 22–38)
AST SERPL W P-5'-P-CCNC: 13 U/L (ref 0–45)
BASE EXCESS BLDA CALC-SCNC: 4.5 MMOL/L (ref -3–3)
BASOPHILS # BLD AUTO: 0 10E3/UL (ref 0–0.2)
BASOPHILS NFR BLD AUTO: 0 %
BILIRUB SERPL-MCNC: 0.5 MG/DL
BILIRUB UR QL STRIP: NEGATIVE
BUN SERPL-MCNC: 18.7 MG/DL (ref 8–23)
CALCIUM SERPL-MCNC: 8.4 MG/DL (ref 8.8–10.2)
CHLORIDE SERPL-SCNC: 100 MMOL/L (ref 98–107)
COHGB MFR BLD: 93.9 % (ref 95–96)
COLOR UR AUTO: ABNORMAL
CREAT SERPL-MCNC: 1.68 MG/DL (ref 0.67–1.17)
CRP SERPL-MCNC: 12.86 MG/L
D DIMER PPP FEU-MCNC: 1.35 UG/ML FEU (ref 0–0.5)
DEPRECATED HCO3 PLAS-SCNC: 31 MMOL/L (ref 22–29)
EGFRCR SERPLBLD CKD-EPI 2021: 40 ML/MIN/1.73M2
EOSINOPHIL # BLD AUTO: 0.1 10E3/UL (ref 0–0.7)
EOSINOPHIL NFR BLD AUTO: 2 %
ERYTHROCYTE [DISTWIDTH] IN BLOOD BY AUTOMATED COUNT: 15.5 % (ref 10–15)
GLUCOSE SERPL-MCNC: 97 MG/DL (ref 70–99)
GLUCOSE UR STRIP-MCNC: NEGATIVE MG/DL
HCO3 BLD-SCNC: 32 MMOL/L (ref 21–28)
HCT VFR BLD AUTO: 23.3 % (ref 40–53)
HGB BLD-MCNC: 7.5 G/DL (ref 13.3–17.7)
HGB UR QL STRIP: NEGATIVE
HOLD SPECIMEN: NORMAL
IMM GRANULOCYTES # BLD: 0 10E3/UL
IMM GRANULOCYTES NFR BLD: 1 %
INR PPP: 1.2 (ref 0.85–1.15)
KETONES UR STRIP-MCNC: NEGATIVE MG/DL
LEUKOCYTE ESTERASE UR QL STRIP: NEGATIVE
LYMPHOCYTES # BLD AUTO: 1.4 10E3/UL (ref 0.8–5.3)
LYMPHOCYTES NFR BLD AUTO: 40 %
MCH RBC QN AUTO: 36.2 PG (ref 26.5–33)
MCHC RBC AUTO-ENTMCNC: 32.2 G/DL (ref 31.5–36.5)
MCV RBC AUTO: 113 FL (ref 78–100)
MONOCYTES # BLD AUTO: 0.5 10E3/UL (ref 0–1.3)
MONOCYTES NFR BLD AUTO: 14 %
MUCOUS THREADS #/AREA URNS LPF: PRESENT /LPF
NEUTROPHILS # BLD AUTO: 1.6 10E3/UL (ref 1.6–8.3)
NEUTROPHILS NFR BLD AUTO: 44 %
NITRATE UR QL: NEGATIVE
NRBC # BLD AUTO: 0 10E3/UL
NRBC BLD AUTO-RTO: 0 /100
NT-PROBNP SERPL-MCNC: 1503 PG/ML (ref 0–1800)
O2/TOTAL GAS SETTING VFR VENT: 36 %
PCO2 BLD: 65 MM HG (ref 35–45)
PH BLD: 7.3 [PH] (ref 7.35–7.45)
PH UR STRIP: 5.5 [PH] (ref 5–9)
PLATELET # BLD AUTO: 85 10E3/UL (ref 150–450)
PO2 BLD: 74 MM HG (ref 80–105)
POTASSIUM SERPL-SCNC: 4 MMOL/L (ref 3.4–5.3)
PROT SERPL-MCNC: 5.3 G/DL (ref 6.4–8.3)
RBC # BLD AUTO: 2.07 10E6/UL (ref 4.4–5.9)
RBC URINE: 1 /HPF
SAO2 % BLDA: 92 % (ref 92–100)
SODIUM SERPL-SCNC: 137 MMOL/L (ref 135–145)
SP GR UR STRIP: 1.01 (ref 1–1.03)
SQUAMOUS EPITHELIAL: 1 /HPF
TROPONIN T SERPL HS-MCNC: 26 NG/L
UROBILINOGEN UR STRIP-MCNC: NORMAL MG/DL
WBC # BLD AUTO: 3.5 10E3/UL (ref 4–11)
WBC URINE: 3 /HPF

## 2024-06-26 PROCEDURE — 96374 THER/PROPH/DIAG INJ IV PUSH: CPT | Mod: XU | Performed by: FAMILY MEDICINE

## 2024-06-26 PROCEDURE — 250N000009 HC RX 250: Performed by: FAMILY MEDICINE

## 2024-06-26 PROCEDURE — 250N000011 HC RX IP 250 OP 636: Mod: JZ | Performed by: FAMILY MEDICINE

## 2024-06-26 PROCEDURE — 250N000011 HC RX IP 250 OP 636: Performed by: FAMILY MEDICINE

## 2024-06-26 PROCEDURE — 82805 BLOOD GASES W/O2 SATURATION: CPT | Performed by: FAMILY MEDICINE

## 2024-06-26 PROCEDURE — 71045 X-RAY EXAM CHEST 1 VIEW: CPT

## 2024-06-26 PROCEDURE — 999N000157 HC STATISTIC RCP TIME EA 10 MIN

## 2024-06-26 PROCEDURE — 99207 PR NO CHARGE LOS: CPT

## 2024-06-26 PROCEDURE — 85610 PROTHROMBIN TIME: CPT | Performed by: FAMILY MEDICINE

## 2024-06-26 PROCEDURE — 85379 FIBRIN DEGRADATION QUANT: CPT | Performed by: FAMILY MEDICINE

## 2024-06-26 PROCEDURE — 99291 CRITICAL CARE FIRST HOUR: CPT | Performed by: FAMILY MEDICINE

## 2024-06-26 PROCEDURE — 93010 ELECTROCARDIOGRAM REPORT: CPT | Performed by: INTERNAL MEDICINE

## 2024-06-26 PROCEDURE — 86140 C-REACTIVE PROTEIN: CPT | Performed by: FAMILY MEDICINE

## 2024-06-26 PROCEDURE — 36600 WITHDRAWAL OF ARTERIAL BLOOD: CPT | Performed by: FAMILY MEDICINE

## 2024-06-26 PROCEDURE — 84484 ASSAY OF TROPONIN QUANT: CPT | Performed by: FAMILY MEDICINE

## 2024-06-26 PROCEDURE — 80053 COMPREHEN METABOLIC PANEL: CPT | Performed by: FAMILY MEDICINE

## 2024-06-26 PROCEDURE — 85730 THROMBOPLASTIN TIME PARTIAL: CPT | Performed by: FAMILY MEDICINE

## 2024-06-26 PROCEDURE — 93005 ELECTROCARDIOGRAM TRACING: CPT | Performed by: FAMILY MEDICINE

## 2024-06-26 PROCEDURE — 36415 COLL VENOUS BLD VENIPUNCTURE: CPT | Performed by: FAMILY MEDICINE

## 2024-06-26 PROCEDURE — 85041 AUTOMATED RBC COUNT: CPT | Performed by: FAMILY MEDICINE

## 2024-06-26 PROCEDURE — 99285 EMERGENCY DEPT VISIT HI MDM: CPT | Mod: 25 | Performed by: FAMILY MEDICINE

## 2024-06-26 PROCEDURE — 96375 TX/PRO/DX INJ NEW DRUG ADDON: CPT | Mod: XU | Performed by: FAMILY MEDICINE

## 2024-06-26 PROCEDURE — 70496 CT ANGIOGRAPHY HEAD: CPT | Mod: MG

## 2024-06-26 PROCEDURE — 83880 ASSAY OF NATRIURETIC PEPTIDE: CPT | Performed by: FAMILY MEDICINE

## 2024-06-26 PROCEDURE — 81001 URINALYSIS AUTO W/SCOPE: CPT | Performed by: FAMILY MEDICINE

## 2024-06-26 PROCEDURE — 99292 CRITICAL CARE ADDL 30 MIN: CPT | Performed by: FAMILY MEDICINE

## 2024-06-26 PROCEDURE — 0042T CT HEAD PERFUSION W CONTRAST: CPT

## 2024-06-26 PROCEDURE — 70450 CT HEAD/BRAIN W/O DYE: CPT | Mod: MG,XU

## 2024-06-26 RX ORDER — ONDANSETRON 2 MG/ML
4 INJECTION INTRAMUSCULAR; INTRAVENOUS ONCE
Status: COMPLETED | OUTPATIENT
Start: 2024-06-26 | End: 2024-06-26

## 2024-06-26 RX ORDER — NALOXONE HYDROCHLORIDE 0.4 MG/ML
0.4 INJECTION, SOLUTION INTRAMUSCULAR; INTRAVENOUS; SUBCUTANEOUS
Status: DISCONTINUED | OUTPATIENT
Start: 2024-06-26 | End: 2024-06-26 | Stop reason: HOSPADM

## 2024-06-26 RX ORDER — IOPAMIDOL 755 MG/ML
117 INJECTION, SOLUTION INTRAVASCULAR ONCE
Status: COMPLETED | OUTPATIENT
Start: 2024-06-26 | End: 2024-06-26

## 2024-06-26 RX ADMIN — NALOXONE HYDROCHLORIDE 0.4 MG: 0.4 INJECTION, SOLUTION INTRAMUSCULAR; INTRAVENOUS; SUBCUTANEOUS at 08:57

## 2024-06-26 RX ADMIN — SODIUM CHLORIDE 100 ML: 9 INJECTION, SOLUTION INTRAVENOUS at 08:23

## 2024-06-26 RX ADMIN — ONDANSETRON 4 MG: 2 INJECTION INTRAMUSCULAR; INTRAVENOUS at 09:06

## 2024-06-26 RX ADMIN — Medication 5 MG: at 09:14

## 2024-06-26 RX ADMIN — IOPAMIDOL 115 ML: 755 INJECTION, SOLUTION INTRAVENOUS at 08:22

## 2024-06-26 ASSESSMENT — COLUMBIA-SUICIDE SEVERITY RATING SCALE - C-SSRS
3. HAVE YOU BEEN THINKING ABOUT HOW YOU MIGHT KILL YOURSELF?: YES
IS THE PATIENT NOT ABLE TO COMPLETE C-SSRS: UNRESPONSIVE
1. IN THE PAST MONTH, HAVE YOU WISHED YOU WERE DEAD OR WISHED YOU COULD GO TO SLEEP AND NOT WAKE UP?: YES
2. HAVE YOU ACTUALLY HAD ANY THOUGHTS OF KILLING YOURSELF IN THE PAST MONTH?: YES
6. HAVE YOU EVER DONE ANYTHING, STARTED TO DO ANYTHING, OR PREPARED TO DO ANYTHING TO END YOUR LIFE?: NO
5. HAVE YOU STARTED TO WORK OUT OR WORKED OUT THE DETAILS OF HOW TO KILL YOURSELF? DO YOU INTEND TO CARRY OUT THIS PLAN?: YES
4. HAVE YOU HAD THESE THOUGHTS AND HAD SOME INTENTION OF ACTING ON THEM?: NO

## 2024-06-26 ASSESSMENT — ACTIVITIES OF DAILY LIVING (ADL)
ADLS_ACUITY_SCORE: 40

## 2024-06-26 NOTE — ED NOTES
"Patients family asking about , stating, \"I don't want them to forget us what's taking so long\" This writer reassured family members that the  will come talk to them when able.   "

## 2024-06-26 NOTE — ED NOTES
Patient requiring frequent reminders to breath.  Brie newby breathing pattern noted. Patient desats to the 70's.

## 2024-06-26 NOTE — ED NOTES
"Patient is now yelling at staff, \"why did you do this to me!  I dont want to live.  What's a matter with you.  Can't you see I want to die?\"  Staff asked patient if he took medication last night to kill himself, patient states, \"yes! I wanted to die! Why did you do this to me? Why didn't you let me die?\".  Patient is now repeatedly yelling to staff, \"I want to die!  Let me die!\"  "

## 2024-06-26 NOTE — PROGRESS NOTES
Pt placed on EtCO2 NC at chronic 2L. Bilat nasal trumpets removed. Pt alert and yelling out. No further respiratory interventions. Josefina Chavez, RRT

## 2024-06-26 NOTE — PROGRESS NOTES
:    Met with patient, his wife, and her daughter in room.    Wife stated she spoke with patient and they would like hospice services.  Patient nodded his head in agreement. Offered private pay long term care placement and she declined due to costs. Gave them list to choose from and she would like someone to come tonight if possible.  Daughter stated she was going to stay with them tonight to help if needed. Referral sent to [facility] as they do late admissions.  Gave them support and encouragement.  [facility] will reach out to them and set up a time to come to the home.    FLORIDALMA Mia on 6/26/2024 at 1:13 PM

## 2024-06-26 NOTE — ED NOTES
Patient is more alert, talking and using arms more. Requested to use urinal. Patient needed assistance positioning urinal and is holding it by himself under blanket.

## 2024-06-26 NOTE — ED NOTES
Patient is back from CT.  Provider and lab are at the bedside.  Provider performing bedside ultrasound.

## 2024-06-26 NOTE — ED NOTES
"Wife stated to provider that she believes patient took OxyContin last night.  Patient give narcan and pupils are now responsive.  Patient is more awake and yelling.  When asked how patient is feeling, patient was able to verbalize to staff, \"not good\".  Patient states nausea, staff ordered zofran.   "

## 2024-06-26 NOTE — ED NOTES
Writer called wife and confirmed pt's wishes are to be DNR/DNI. Provider aware.    Sherley Drummond RN on 6/26/2024 at 8:29 AM

## 2024-06-26 NOTE — ED NOTES
Nurse ED called at this time from the CT room to notify provider that chart states patient is full code as of last polst.  EMS stated to staff that patients wife did not want patient to be intubated or resuscitated.  Staff requested provider call patients wife to confirm.

## 2024-06-26 NOTE — ED NOTES
Poison control contacted. Monitor for 4 hours after narcan, 1300. Check ASA and tylenol level. Were recommendation received from poison control.    Sherley Drummond RN on 6/26/2024 at 9:32 AM

## 2024-06-26 NOTE — ED NOTES
Patients legs elevated with pillow on daughters request. Patient agreeable to this. Patient starting to move hands around under blankets but did not assist with lifting his legs to place pillow.

## 2024-06-26 NOTE — CONSULTS
[facility]    Stroke Telephone Note    I was called by Dr. Lalita Allred on 06/26/24 regarding patient Aden Rosa. The patient is a 84 year old male with a PMH significant of HTN, multiple myeloma s/p stem cell transplant (on Revlimid and Velcade), and low back pain. History obtained from ED provider from EMS. Per EMS, Aden was  LKW last night before bed. His wife found him this morning unresponsive and with his O2 off. She placed it back on at 2L and his mental status did not improve. On ED providers examination, left pupil is pin point and not responsive, R pupil is sluggish. He was initially responding to noxious stimuli but has had minimal response on repeat testing.      Called and spoke to Aden's Wife Amanda. Amanda confirmed his LKW was 10:00 pm yesterday (6/25). Symptoms discovery was at 0700 this morning. She reports that he typically wears supplemental O2 all the time (has for 4 years) and she found him without his O2 on and she is unsure how long he didn't have it on. He was unresponsive to her commands this morning. He is not on AC.    Vitals  BP: (!) 172/63   Pulse: 69   Resp: (!) 5            Stroke Code Data (for stroke code without tele)  Stroke code activated 06/26/24  0805   Stroke provider first response 06/26/24  0807   Last known normal 06/25/24  2200      Time of discovery (or onset of symptoms) 06/26/24  0700   Head CT read by Stroke Neuro Provider 06/26/24  0815   Was stroke code de-escalated? Yes  06/26/24  0850     Imaging Findings  CT head: No evidence of hemorrhage or acute intracranial process.   CTA head/neck: No large vessel occlusion.   CT Perfusion: Normal    Intravenous Thrombolysis  Not given due to:   - unclear or unfavorable risk-benefit profile for extended window thrombolysis beyond the conventional 4.5 hour time window    Endovascular Treatment  Not initiated due to absence of proximal vessel  "occlusion    Impression  Unresponsiveness/altered mental status, unclear etiology however was found without O2 nasal cannula in place for an unknown duration of time. Concern for metabolic/infectious/toxic etiology. Recommend MRI Brain to evaluate for acute stroke.     Recommendations   - brain MRI with and without contrast; please page stroke neurology if infarct is identified for further recommendations  - Recommend toxic/metabolic/infectious work up per primary team     Case discussed with vascular neurology attending Dr. Dawn.    My recommendations are based on the information provided over the phone by Aden Rosa's in-person providers. They are not intended to replace the clinical judgment of his in-person providers. I was not requested to personally see or examine the patient at this time.     Lisa Davila PA-C  Vascular Neurology    To page me or covering stroke neurology team member, click here: [facility]  Choose \"On Call\" tab at top, then select \"NEUROLOGY/ALL SITES\" from middle drop-down box, press Enter, then look for \"stroke\" or \"telestroke\" for your site.   "

## 2024-06-26 NOTE — ED NOTES
Staff remains with patient.  No change in status, patient continues to have william newby breathing pattern.  Per assisting staff, wife will be to the ER within an hour.

## 2024-06-26 NOTE — ED NOTES
Pt's wife contacted writer and stated she thinks pt may have taken some of his oxycontin last night. Wife states the bottle is not empty. Provider aware and orders were placed.    Sherley Drummond RN on 6/26/2024 at 8:57 AM

## 2024-06-26 NOTE — ED TRIAGE NOTES
"ED Nursing Triage Note (General)   ________________________________    Aden Rosa is a 84 year old Male that presents to triage via [facility] with complaints of altered mental status.  Per EMS, they were called to the scene after the wife woke up this morning and found patient did not have his 02 on.  Per wife, patient chronically wears 2L per [place].  On scene, patient was responsive to painful stimuli for EMS.  EMS states they were going to implement an advanced airway, however, wife stated to EMS that patient does not wish to be intubated or resuscitated.  LKW at 10pm last night.    Significant symptoms had onset 1 hour(s) ago.  Vital signs:                         Estimated body mass index is 33.67 kg/m  as calculated from the following:    Height as of 8/17/22: 1.702 m (5' 7\").    Weight as of 3/6/24: 97.5 kg (215 lb).    PRE HOSPITAL PRIOR LIVING SITUATION Spouse      Triage Assessment (Adult)       Row Name 06/26/24 0810          Triage Assessment    Airway WDL X        Respiratory WDL    Respiratory WDL X        Skin Circulation/Temperature WDL    Skin Circulation/Temperature WDL WDL        Peripheral/Neurovascular WDL    Peripheral Neurovascular WDL WDL        Cognitive/Neuro/Behavioral WDL    Cognitive/Neuro/Behavioral WDL X        Bernadette Coma Scale    Best Eye Response 4-->(E4) spontaneous     Best Motor Response 4-->(M4) withdraws from pain     Best Verbal Response 1-->(V1) none     Bernadette Coma Scale Score 9                     "

## 2024-06-26 NOTE — ED NOTES
"Patients wife returned to room with neighbor and asked writer, \"how is it you expect her to care for him at home\" This writer encouraged wife to ask  if there are any resources that may help. This writer also encouraged wife to call patients children and reach out for family support. Patients wife appears very hesitant to call the children at this time but neighbor offered to call together. Family is in waiting room to make phone calls, will return to room after.  "

## 2024-06-26 NOTE — PROGRESS NOTES
RT went with pt to CT. Pt arrived with 2 nasal trumpets in place and 10 lpm non rebreather with sats ranging from 92-98%.  Transitioned pt to 2 lpm nasal canula in CT.  BS diminished with what appears to cheyne newby breathing pattern.      Ambreen Veras, RT on 6/26/2024 at 8:34 AM

## 2024-06-26 NOTE — ED NOTES
Staff called provider phone at this time to confirm provider was able to speak with patients wife.  Per provider, wife has been contacted and confirmed with provider that she does not wish for patient to be a full code and that the patient is a DNR/DNI.  CT staff and RT notified of code status of DNR/DNI.

## 2024-06-26 NOTE — ED PROVIDER NOTES
[facility]  Emergency Department Visit Note    Altered Mental Status      History of Present Illness     HPI:  Aden Rosa is a 84 year old male presenting with low respiratory rate and minimally responsive.  He has a history of CHF and multiple myeloma.  He was last known well last evening around 10 pm at bedtime with his wife and was behaving normally.  He is well functioning and independent at baseline.  She awoke this morning around 7 am and didn't have his oxygen in place which is normally on at 2 LPM.  He wasn't responding to her but was breathing.  She called the ambulance.  His BG was normal and EKG was normal.  He was responding to painful stimuli.  He was put on NC oxygen and increased his saturation to the 90's.  Pulse and BP were normal.  Later history , his wife notes that she thinks he took some of his normally prescribed oxycontin.  She confirmed that he is a DNR/DNI to EMS.  He is not responsive for me.      Review of Systems:  10 point review of systems obtained and pertinent positive and negative findings noted in HPI. Review of systems otherwise negative.  Medications:  Reviewed in Epic Allergies:  Reviewed in Epic Problem List:  Reviewed in Epic   Past Medical History:  Reviewed in Epic Past Surgical History:  Reviewed in Epic Social History:  Reviewed in Epic       Physical Exam     Vital signs: /50   Pulse 71   Resp 17   SpO2 94%     Physical Exam:  Physical Exam  Vitals and nursing note reviewed.   HENT:      Head: Normocephalic and atraumatic.   Eyes:      Comments: Left pupil is pinpoint and not responsive, right pupil sluggish.   Cardiovascular:      Rate and Rhythm: Normal rate and regular rhythm.      Heart sounds: Murmur heard.   Pulmonary:      Comments: Shallow infrequent irregular breathing, progressing to agonal breathing.  Nasal airway in place.  Confirmed patient's DNR/DNI status.  Abdominal:      General: Bowel sounds are normal.      Palpations:  Abdomen is soft.   Skin:     General: Skin is warm and dry.   Neurological:      GCS: GCS eye subscore is 2. GCS verbal subscore is 1. GCS motor subscore is 4.      Comments: Patient is agonal breathing         ED Course     ED Course as of 06/26/24 1338   Wed Jun 26, 2024   0813 Nursing staff is calling his wife to confirm DNR/DNI by EMS sign out.   0814 Stroke Neuro called, patient not yet in the room, will speak with them again after I have examined the patient.   0817 The patient went straight to CT scanner, I was not given an opportunity to examine the patient yet.   0819 His wife has confirmed his DNR/DNI status.   0843 POC ECHO showing mildly reduced EF, no appreciable wall motion abnormality.   0852 Ct negative for stroke, talked with Stroke Neuro they agree.  Patient's wife still not present, working on a ride to the hospital.   0855 His wife thinks he took his normal oxycontin dosage last night, the bottle isn't empty.  She doesn't think he took more than prescribed.  Clinically with his low respiratory rate and pupils, we will give narcan.   0904 Narcan given, patient's respiratory rate improved, he is now waking up.  He is in distress.  Pupils are equal and large.   0907 The patient is very distressed, we will trial 5 mg of ketamine for agitation without further respiratory depression.   0928 Patient is now awake and reports that he was intentionally overdosed on Oxycontin and removed his oxygen.  He is very angry that his life was saved and repeats that he would like to die.  He does have terminal cancer.   He was not hospice as far as we know and his wife requested the ambulance.  She is still not here to discuss.     1016 Labs reviewed, WBC low and anemia consistent with his multiple myeloma.  Troponin elevated, likely from cardiac strain from respiratory depression but will trend.  D-dimer appropriate for age.  CRP likely elevated due to stress.  CMP relatively unremarkable.  ABG consistent with  respiratory depression and failure.  SUSY. No other treatments pursued at this time.   1019 Possible pneumonia on CXR.  Will discuss if he would like treatment for this when he is awake again.  His wife is here and gave further history that she found the oxycontin bottle in the bathroom and his oxygen tubing on the table which is completely unlike him.  He always takes his oxycontin in the kitchen and never has his oxygen off.  He has been on opioids for 13 years, since he was diagnosed with multiple myeloma.  He has not been himself for the past few days and she endorses that he is very depressed.  They have not considered hospice care at this point, for unclear reasons.  He has been continuing cancer treatments weekly.  He did have a period of it wounds like 5 years in the past 13 years that he wasn't receiving treatments and then the cancer returned 4 years ago.  She was not aware of his suicidal intentions.  She is surprised that he reported this was intentional.  He is currently sedated with ketamine due to severe agitation after narcan to save his life.     1022 His wife is very distressed. She doesn't feel she can take care of him anymore.  She hadn't discussed this with him.  He has 2 daughters, she has talked to one of them.  She feels that they understand the situation but they haven't   1023  Come and haven't offered to help her.  We discussed strategies for more direct language to relay his medical and physical status to family members to reach out for help but she is not sure she needs to do this.  She is willing to speak with social work later today.  We can continue the conversation when the patient has had time to process the ketamine and can participate in care goals.  She is not currently interested in discussing mental health treatment.     Procedures              EKG Interpretation:      Interpreted by Lalita Allred MD  Time reviewed: 8:25 approximately  Symptoms at time of EKG: not  responsive   Rhythm: normal sinus   Rate: normal  Axis: normal  Ectopy: none  Conduction: normal  ST Segments/ T Waves: No ST-T wave changes  Q Waves: none    Clinical Impression: normal EKG    Critical Care time:  was 8:25 to 13:39 for this patient excluding procedures.    The patient has stroke symptoms:         ED Stroke specific documentation           NIHSS PDF     Patient last known well time: 10pm yesterday 6/25/24  ED Provider first to bedside at: approximately 08:25  CT Results received at: 08:43    Thrombolytics:   Not given due to:   - no stroke detected    If treating with thrombolytics: Ensure SBP<180 and DBP<105 prior to treatment with thrombolytics.  Administering thrombolytics after treatment with IV labetalol, hydralazine, or nicardipine is reasonable once BP control is established.    Endovascular Retrieval:  Not initiated due to absence of proximal vessel occlusion    National Institutes of Health Stroke Scale (Baseline)  Time Performed: 08: 25  Not collected       Stroke Mimics were considered (including migraine headache, seizure disorder, hypoglycemia (or hyperglycemia), head or spinal trauma, CNS infection, Toxin ingestion and shock state (e.g. sepsis) .         Results for orders placed or performed during the hospital encounter of 06/26/24 (from the past 24 hour(s))   CT Head w/o Contrast    Narrative    Exam: CT HEAD W/O CONTRAST      Exam reason: unconscious    Technique:   Axial images of the head obtained without contrast. Coronal and  sagittal reformations were generated. This CT was performed using one  or more of the following dose reduction techniques: automated exposure  control, adjustment of the mA and/or kV according to patient size,  and/or use of iterative reconstruction technique.    Comparison: 8/17/2022       Findings:      Parenchyma: No evidence of intraparenchymal hemorrhage, mass, acute  cortical infarct or prior infarct in a major vascular territory.      There is  patchy periventricular and deep white matter attenuation,  nonspecific but likely due to chronic microvascular ischemic change.    No midline shift. The basilar cisterns are patent.    Extra-axial spaces: No extra-axial fluid collection or hemorrhage.     Ventricles: Normal.  Paranasal sinuses: Clear.   Mastoid air cells: Clear.    Osseous: No acute findings. Multiple unchanged lytic lesions are again  noted.  Orbits: Normal.    Soft tissues: Unremarkable.       Impression    Impression:  No acute intracranial abnormality.      These results were discussed with Dr. Allred by Dr. Aguilar on  6/26/2024 8:42 AM.    KATELYNN AGUILAR MD         SYSTEM ID:  [id number]   CTA Head Neck with Contrast    Narrative    Exam: CTA HEAD NECK W CONTRAST    Exam reason: Code Stroke to evaluate for potential thrombolysis and  thrombectomy. PLEASE READ IMMEDIATELY.    Technique:     Using helical CT technique, and IV contrast, thin section arterial  phase axial images of the neck and head ([place], [place]) were  performed, down to the level of the aortic arch.  Post-processing 2D  reformatted images of the carotid arteries and cerebral arteries were  performed, including coronal and sagittal reconstructions. MIP  reconstructions of the carotid arteries and cerebral arteries also  performed. This CT was performed using one or more of the following  dose reduction techniques: automated exposure control, adjustment of  the mA and/or kV according to patient size, and/or use of iterative  reconstruction technique.    Meds/Contrast: 75 ml isovue 370    Comparison: None.    Findings:    CTA of the neck:     Aortic arch: No significant abnormality of the aortic arch and the  origins of its branch vessels.       Right common carotid artery: There is a short segment of the right  common carotid artery near its origin which is obscured by beam  hardening artifact. No significant stenosis is demonstrated.     Right internal carotid artery:  No hemodynamically significant  stenosis.  Mild calcified plaque.    Left common carotid artery: No significant stenosis.     Left internal carotid artery: There is a 60% stenosis of the left  internal carotid artery near its origin. Mild calcified plaque.    Vertebral arteries: No hemodynamically significant stenosis.     * Note: Measurements of stenoses were done in accordance with NASCET  criteria. That is, the stenosis is calculated from the ratio of the  linear luminal diameter of the narrowest segment of the diseased  portion of the artery compared to the diameter of the artery beyond or  distal to any post stenotic dilatation.     CTA of the head ([place], [place]):     General: No occlusion, thrombosis, hemodynamically significant  stenosis or aneurysm about the [place].     Anterior communicating artery: Anterior communicating artery is  present.  Posterior communicating arteries: Patent on the right. Hypoplastic or  absent on the left.    Additional Findings:    Intracranial contents: No evidence of acute cortical infarct, infarct  in a major vascular territory, mass or enhancing abnormality.     Soft tissues of the neck: No acute findings. Multinodular thyroid is  noted.    Visualized lung apices: Clear.    Osseous: No acute osseous abnormality. Scattered degenerative changes  of the cervical spine.      Impression    Impression:    No large vessel occlusion.    There is 60% stenosis of the proximal left ICA. No other  hemodynamically significant carotid or vertebrobasilar stenosis.    No stenosis, occlusion, or aneurysm of the intracranial arteries.    These results were discussed with Dr. Allred by Dr. Aguilar on  6/26/2024 8:42 AM.    KATELYNN AGUILAR MD         SYSTEM ID:  [id number]   CT Head Perfusion w Contrast - For Tier 2 Stroke    Narrative    Exam: CT HEAD PERFUSION W CONTRAST    History:  Code Stroke to evaluate for potential thrombolysis and  thrombectomy. Evaluate mismatch  between penumbra and core infarct.  READ IMMEDIATELY..     Technique:     Dynamic perfusion CT of the brain was performed at multiple levels,  with 10 mm slice thickness; levels were obtained after injection of 40  mL of intravenous contrast.     This CT was performed using one or more of the following dose  reduction techniques: automated exposure control, adjustment of the mA  and/or kV according to patient size, and/or use of iterative  reconstruction technique.    Contrast Dose:Isovue 370, 40mL    Comparison: none     Findings:     No evidence of ischemia or acute infarction. There is normal perfusion  of the brain at the visualized levels      Impression    Impression:     No evidence of ischemia or acute infarction.    These results were discussed with Dr. Allred by Dr. Aguilar on  6/26/2024 8:42 AM.    KATELYNN AGUILAR MD         SYSTEM ID:  [id number]   Blood gas arterial   Result Value Ref Range    pH Arterial 7.30 (L) 7.35 - 7.45    pCO2 Arterial 65 (H) 35 - 45 mm Hg    pO2 Arterial 74 (L) 80 - 105 mm Hg    FIO2 36     Bicarbonate Arterial 32 (H) 21 - 28 mmol/L    Base Excess/Deficit Arterial 4.5 (H) -3.0 - 3.0 mmol/L    Fadi's Test No     Oxyhemoglobin Arterial 92 92 - 100 %    O2 Sat, Arterial 93.9 (L) 95.0 - 96.0 %    Narrative    In healthy individuals, oxyhemoglobin (O2Hb) and oxygen saturation (SO2) are approximately equal. In the presence of dyshemoglobins, oxyhemoglobin can be considerably lower than oxygen saturation.   Extra Tube    Narrative    The following orders were created for panel order Extra Tube.  Procedure                               Abnormality         Status                     ---------                               -----------         ------                     Extra Blue Top Tube[[id number]]                              Final result               Extra Red Top Tube[[id number]]                               Final result               Extra Green Top (Lithium...[[id number]]                       Final result               Extra Purple Top Tube[[id number]]                            Final result                 Please view results for these tests on the individual orders.   Extra Blue Top Tube   Result Value Ref Range    Hold Specimen JIC    Extra Red Top Tube   Result Value Ref Range    Hold Specimen JIC    Extra Green Top (Lithium Heparin) Tube   Result Value Ref Range    Hold Specimen JIC    Extra Purple Top Tube   Result Value Ref Range    Hold Specimen JIC    XR Chest Port 1 View    Narrative    Exam:  XR CHEST PORT 1 VIEW    HISTORY: unconscious.    COMPARISON:  3/6/2024, 8/17/2022    FINDINGS:     The cardiomediastinal contours are enlarged.      Low lung volumes. Patchy opacities in the mid to lower lungs  bilaterally. Possible small bilateral pleural effusions. No  pneumothorax.    No acute osseous abnormality.       Impression    IMPRESSION:      Patchy opacities in the mid to lower lungs bilaterally may be due to  atelectasis or possibly pneumonia. Possible small bilateral pleural  effusions.      KATELYNN GALE MD         SYSTEM ID:  [id number]   CBC with platelets differential    Narrative    The following orders were created for panel order CBC with platelets differential.  Procedure                               Abnormality         Status                     ---------                               -----------         ------                     CBC with platelets and d...[[id number]]  Abnormal            Final result                 Please view results for these tests on the individual orders.   D dimer quantitative   Result Value Ref Range    D-Dimer Quantitative 1.35 (H) 0.00 - 0.50 ug/mL FEU    Narrative    This D-dimer assay is intended for use in conjunction with a clinical pretest probability assessment model to exclude pulmonary embolism (PE) and deep venous thrombosis (DVT) in outpatients suspected of PE or DVT. The cut-off value is 0.50 ug/mL FEU.    For patients 50 years of  age or older, the application of age-adjusted cut-off values for D-Dimer may increase the specificity without significant effect on sensitivity. The literature suggested calculation age adjusted cut-off in ug/L = age in years x 10 ug/L. The results in this laboratory are reported as ug/mL rather than ug/L. The calculation for age adjusted cut off in ug/mL= age in years x 0.01 ug/mL. For example, the cut off for a 76 year old male is 77 x 0.01 ug/mL = 0.76 ug/mL (760 ug/L).    M Simon et al. Age adjusted D-dimer cut-off levels to rule out pulmonary embolism: The ADJUST-PE Study. DEYSI 2014;311:6035-3856.; HJ Marlo et al. Diagnostic accuracy of conventional or age adjusted D-dimer cutoff values in older patients with suspected venous thromboembolism. Systemic review and meta-analysis. BMJ 2013:346:f2492.   Comprehensive metabolic panel   Result Value Ref Range    Sodium 137 135 - 145 mmol/L    Potassium 4.0 3.4 - 5.3 mmol/L    Carbon Dioxide (CO2) 31 (H) 22 - 29 mmol/L    Anion Gap 6 (L) 7 - 15 mmol/L    Urea Nitrogen 18.7 8.0 - 23.0 mg/dL    Creatinine 1.68 (H) 0.67 - 1.17 mg/dL    GFR Estimate 40 (L) >60 mL/min/1.73m2    Calcium 8.4 (L) 8.8 - 10.2 mg/dL    Chloride 100 98 - 107 mmol/L    Glucose 97 70 - 99 mg/dL    Alkaline Phosphatase 67 40 - 150 U/L    AST 13 0 - 45 U/L    ALT 8 0 - 70 U/L    Protein Total 5.3 (L) 6.4 - 8.3 g/dL    Albumin 3.2 (L) 3.5 - 5.2 g/dL    Bilirubin Total 0.5 <=1.2 mg/dL   Troponin T, High Sensitivity   Result Value Ref Range    Troponin T, High Sensitivity 26 (H) <=22 ng/L   CRP inflammation   Result Value Ref Range    CRP Inflammation 12.86 (H) <5.00 mg/L   Nt probnp inpatient (BNP)   Result Value Ref Range    N terminal Pro BNP Inpatient 1,503 0 - 1,800 pg/mL   INR   Result Value Ref Range    INR 1.20 (H) 0.85 - 1.15   Partial thromboplastin time   Result Value Ref Range    aPTT 25 22 - 38 Seconds   CBC with platelets and differential   Result Value Ref Range    WBC Count 3.5 (L)  4.0 - 11.0 10e3/uL    RBC Count 2.07 (L) 4.40 - 5.90 10e6/uL    Hemoglobin 7.5 (L) 13.3 - 17.7 g/dL    Hematocrit 23.3 (L) 40.0 - 53.0 %     (H) 78 - 100 fL    MCH 36.2 (H) 26.5 - 33.0 pg    MCHC 32.2 31.5 - 36.5 g/dL    RDW 15.5 (H) 10.0 - 15.0 %    Platelet Count 85 (L) 150 - 450 10e3/uL    % Neutrophils 44 %    % Lymphocytes 40 %    % Monocytes 14 %    % Eosinophils 2 %    % Basophils 0 %    % Immature Granulocytes 1 %    NRBCs per 100 WBC 0 <1 /100    Absolute Neutrophils 1.6 1.6 - 8.3 10e3/uL    Absolute Lymphocytes 1.4 0.8 - 5.3 10e3/uL    Absolute Monocytes 0.5 0.0 - 1.3 10e3/uL    Absolute Eosinophils 0.1 0.0 - 0.7 10e3/uL    Absolute Basophils 0.0 0.0 - 0.2 10e3/uL    Absolute Immature Granulocytes 0.0 <=0.4 10e3/uL    Absolute NRBCs 0.0 10e3/uL   UA with Microscopic reflex to Culture    Specimen: Urine, Clean Catch   Result Value Ref Range    Color Urine Light Yellow Colorless, Straw, Light Yellow, Yellow    Appearance Urine Clear Clear    Glucose Urine Negative Negative mg/dL    Bilirubin Urine Negative Negative    Ketones Urine Negative Negative mg/dL    Specific Gravity Urine 1.010 1.003 - 1.035    Blood Urine Negative Negative    pH Urine 5.5 5.0 - 9.0    Protein Albumin Urine 10 (A) Negative mg/dL    Urobilinogen Urine Normal Normal, 2.0 mg/dL    Nitrite Urine Negative Negative    Leukocyte Esterase Urine Negative Negative    Mucus Urine Present (A) None Seen /LPF    RBC Urine 1 <=2 /HPF    WBC Urine 3 <=5 /HPF    Squamous Epithelials Urine 1 <=1 /HPF    Narrative    Urine Culture not indicated       Medications   naloxone (NARCAN) injection 0.4 mg (0.4 mg Intravenous $Given 6/26/24 0857)   iopamidol (ISOVUE-370) solution 117 mL (115 mLs Intravenous $Given 6/26/24 0896)     And   sodium chloride 0.9 % bag for CT scan flush use (100 mLs As instructed $Given 6/26/24 1647)   ondansetron (ZOFRAN) injection 4 mg (4 mg Intravenous $Given 6/26/24 9347)   ketamine (KETALAR) injection 5 mg (5 mg  Intravenous $Given 6/26/24 1182)     Active Problems:    * No active hospital problems. *       Medical Decision Making     Aden Rosa is a 84 year old male presenting with acute respiratory failure from intentional overdose of opioids in the setting of end-stage multiple myeloma.  Patient was resuscitated with Narcan.  There were multiple in-depth conversations with his wife and children.  Social work got involved as well.  Through shared decision making with the patient and his family he will be discharged home with hospice care.  [facility] hospice will be available this evening.  His daughter will be staying with the patient and his wife to help with his cares.  He can be transported by EMS because he is on chronic oxygen therapy.    Patient given instructions on follow-up and warning signs for which to return to ED. All questions were answered and the patient is comfortable with plan for discharge. The patient was discharged in stable condition or transferred in as stable condition as possible.    I have reviewed the patient's Medical Imaging and Medical Records.  I have reviewed the nursing notes.  I have reviewed the findings, diagnosis, plan and need for follow up with the patient.    Diagnosis & Disposition     Diagnosis:  1. Cancer (H)        Disposition:  Discharge Home with Hospice Care    [facility] Emergency Department  Lalita Allred MD  Family Medicine     Lalita Allred MD  06/26/24 6331

## 2024-06-26 NOTE — ED NOTES
"Patient opened eyes, lifted head and looked directly at writer. This writer asked patient if he would like an additional blanket or if there was anything we could do for him and patient responded with a polite, \"no thank you\". This writer encouraged patient to let us know if there is anything he needs and he stated, \"thank you\"  "

## 2024-06-28 LAB
ATRIAL RATE - MUSE: 73 BPM
DIASTOLIC BLOOD PRESSURE - MUSE: NORMAL MMHG
INTERPRETATION ECG - MUSE: NORMAL
P AXIS - MUSE: 29 DEGREES
PR INTERVAL - MUSE: 208 MS
QRS DURATION - MUSE: 100 MS
QT - MUSE: 422 MS
QTC - MUSE: 464 MS
R AXIS - MUSE: -9 DEGREES
SYSTOLIC BLOOD PRESSURE - MUSE: NORMAL MMHG
T AXIS - MUSE: 11 DEGREES
VENTRICULAR RATE- MUSE: 73 BPM

## (undated) RX ORDER — GABAPENTIN 300 MG/1
CAPSULE ORAL
Status: DISPENSED
Start: 2024-03-06

## (undated) RX ORDER — OXYCODONE HYDROCHLORIDE 5 MG/1
TABLET ORAL
Status: DISPENSED
Start: 2022-08-17

## (undated) RX ORDER — IPRATROPIUM BROMIDE AND ALBUTEROL SULFATE 2.5; .5 MG/3ML; MG/3ML
SOLUTION RESPIRATORY (INHALATION)
Status: DISPENSED
Start: 2019-03-31

## (undated) RX ORDER — ONDANSETRON 2 MG/ML
INJECTION INTRAMUSCULAR; INTRAVENOUS
Status: DISPENSED
Start: 2024-06-26

## (undated) RX ORDER — NALOXONE HYDROCHLORIDE 0.4 MG/ML
INJECTION, SOLUTION INTRAMUSCULAR; INTRAVENOUS; SUBCUTANEOUS
Status: DISPENSED
Start: 2024-06-26

## (undated) RX ORDER — ACETAMINOPHEN 325 MG/1
TABLET ORAL
Status: DISPENSED
Start: 2019-03-31

## (undated) RX ORDER — OXYCODONE HCL 10 MG/1
TABLET, FILM COATED, EXTENDED RELEASE ORAL
Status: DISPENSED
Start: 2019-03-31

## (undated) RX ORDER — LIDOCAINE HYDROCHLORIDE AND EPINEPHRINE 10; 10 MG/ML; UG/ML
INJECTION, SOLUTION INFILTRATION; PERINEURAL
Status: DISPENSED
Start: 2024-03-06

## (undated) RX ORDER — OXYCODONE HYDROCHLORIDE 5 MG/1
TABLET ORAL
Status: DISPENSED
Start: 2019-03-31

## (undated) RX ORDER — IPRATROPIUM BROMIDE AND ALBUTEROL SULFATE 2.5; .5 MG/3ML; MG/3ML
SOLUTION RESPIRATORY (INHALATION)
Status: DISPENSED
Start: 2024-03-06

## (undated) RX ORDER — SODIUM CHLORIDE, SODIUM LACTATE, POTASSIUM CHLORIDE, CALCIUM CHLORIDE 600; 310; 30; 20 MG/100ML; MG/100ML; MG/100ML; MG/100ML
INJECTION, SOLUTION INTRAVENOUS
Status: DISPENSED
Start: 2019-04-01

## (undated) RX ORDER — FOLIC ACID 1 MG/1
TABLET ORAL
Status: DISPENSED
Start: 2022-08-18

## (undated) RX ORDER — GABAPENTIN 300 MG/1
CAPSULE ORAL
Status: DISPENSED
Start: 2019-03-31

## (undated) RX ORDER — SODIUM CHLORIDE, SODIUM LACTATE, POTASSIUM CHLORIDE, CALCIUM CHLORIDE 600; 310; 30; 20 MG/100ML; MG/100ML; MG/100ML; MG/100ML
INJECTION, SOLUTION INTRAVENOUS
Status: DISPENSED
Start: 2019-03-31

## (undated) RX ORDER — AZITHROMYCIN 500 MG/5ML
INJECTION, POWDER, LYOPHILIZED, FOR SOLUTION INTRAVENOUS
Status: DISPENSED
Start: 2019-03-31